# Patient Record
Sex: MALE | Race: WHITE | NOT HISPANIC OR LATINO | Employment: PART TIME | URBAN - METROPOLITAN AREA
[De-identification: names, ages, dates, MRNs, and addresses within clinical notes are randomized per-mention and may not be internally consistent; named-entity substitution may affect disease eponyms.]

---

## 2017-01-16 ENCOUNTER — OFFICE VISIT (OUTPATIENT)
Dept: FAMILY MEDICINE CLINIC | Facility: CLINIC | Age: 62
End: 2017-01-16

## 2017-01-16 VITALS
DIASTOLIC BLOOD PRESSURE: 89 MMHG | SYSTOLIC BLOOD PRESSURE: 136 MMHG | HEIGHT: 73 IN | WEIGHT: 190 LBS | RESPIRATION RATE: 16 BRPM | TEMPERATURE: 98 F | BODY MASS INDEX: 25.18 KG/M2 | HEART RATE: 95 BPM

## 2017-01-16 DIAGNOSIS — IMO0001 UNCONTROLLED TYPE 2 DIABETES MELLITUS WITHOUT COMPLICATION, WITH LONG-TERM CURRENT USE OF INSULIN: Primary | ICD-10-CM

## 2017-01-16 DIAGNOSIS — N52.9 ERECTILE DYSFUNCTION, UNSPECIFIED ERECTILE DYSFUNCTION TYPE: ICD-10-CM

## 2017-01-16 DIAGNOSIS — E78.00 PURE HYPERCHOLESTEROLEMIA: ICD-10-CM

## 2017-01-16 DIAGNOSIS — F41.9 ANXIETY: ICD-10-CM

## 2017-01-16 PROCEDURE — 99214 OFFICE O/P EST MOD 30 MIN: CPT | Performed by: FAMILY MEDICINE

## 2017-01-16 RX ORDER — TADALAFIL 20 MG/1
20 TABLET ORAL DAILY PRN
Qty: 6 TABLET | Refills: 5 | Status: SHIPPED | OUTPATIENT
Start: 2017-01-16 | End: 2018-02-15 | Stop reason: SDUPTHER

## 2017-01-16 RX ORDER — METFORMIN HYDROCHLORIDE 500 MG/1
2000 TABLET, EXTENDED RELEASE ORAL
Qty: 360 TABLET | Refills: 1 | Status: SHIPPED | OUTPATIENT
Start: 2017-01-16 | End: 2017-07-24 | Stop reason: SDUPTHER

## 2017-01-16 RX ORDER — ATORVASTATIN CALCIUM 20 MG/1
20 TABLET, FILM COATED ORAL NIGHTLY
Qty: 90 TABLET | Refills: 1 | Status: SHIPPED | OUTPATIENT
Start: 2017-01-16 | End: 2017-07-24 | Stop reason: SDUPTHER

## 2017-01-16 RX ORDER — BUPROPION HYDROCHLORIDE 300 MG/1
300 TABLET ORAL NIGHTLY
Qty: 90 TABLET | Refills: 1 | Status: SHIPPED | OUTPATIENT
Start: 2017-01-16 | End: 2017-07-24 | Stop reason: SDUPTHER

## 2017-01-16 RX ORDER — PIOGLITAZONEHYDROCHLORIDE 15 MG/1
15 TABLET ORAL DAILY
Qty: 90 TABLET | Refills: 1 | Status: SHIPPED | OUTPATIENT
Start: 2017-01-16 | End: 2017-07-15

## 2017-01-16 NOTE — MR AVS SNAPSHOT
Dylan Mosher   1/16/2017 9:15 AM   Office Visit    Provider:  Dylan Paulson MD   Department:  [facility]   Dept Phone:  [phone]                Your Full Care Plan              Today's Medication Changes          These changes are accurate as of: 1/16/17  9:46 AM.  If you have any questions, ask your nurse or doctor.               New Medication(s)Ordered:     metFORMIN  MG 24 hr tablet   Commonly known as:  GLUCOPHAGE-XR   Take 4 tablets by mouth Daily With Dinner for 180 days.   Started by:  Dylan Paulson MD       tadalafil 20 MG tablet   Commonly known as:  CIALIS   Take 1 tablet by mouth Daily As Needed for erectile dysfunction for up to 180 days.   Started by:  Dylan Paulson MD            Where to Get Your Medications      These medications were sent to [address] AT [facility] [phone] [facility] [phone] [address], [facility] [address]     Phone:  [phone]     atorvastatin 20 MG tablet    buPROPion  MG 24 hr tablet    Canagliflozin 300 MG tablet    metFORMIN  MG 24 hr tablet    pioglitazone 15 MG tablet    tadalafil 20 MG tablet                  Your Updated Medication List          This list is accurate as of: 1/16/17  9:46 AM.  Always use your most recent med list.                aspirin 81 MG EC tablet       atorvastatin 20 MG tablet   Commonly known as:  LIPITOR   Take 1 tablet by mouth Every Night for 180 days.       buPROPion  MG 24 hr tablet   Commonly known as:  WELLBUTRIN XL   Take 1 tablet by mouth Every Night for 180 days.       Canagliflozin 300 MG tablet   Commonly known as:  INVOKANA   Take 300 mg by mouth Every Morning for 180 days.       metFORMIN  MG 24 hr tablet   Commonly known as:  GLUCOPHAGE-XR   Take 4 tablets by mouth Daily With Dinner for 180 days.       pioglitazone 15 MG tablet   Commonly known as:   "ACTOS   Take 1 tablet by mouth Daily for 180 days.       tadalafil 20 MG tablet   Commonly known as:  CIALIS   Take 1 tablet by mouth Daily As Needed for erectile dysfunction for up to 180 days.               We Performed the Following     Ambulatory Referral to Ophthalmology       You Were Diagnosed With        Codes Comments    Uncontrolled type 2 diabetes mellitus without complication, with long-term current use of insulin    -  Primary ICD-10-CM: E11.65, Z79.4  ICD-9-CM: 250.02, V58.67     Pure hypercholesterolemia     ICD-10-CM: E78.00  ICD-9-CM: 272.0     Anxiety     ICD-10-CM: F41.9  ICD-9-CM: 300.00     Erectile dysfunction, unspecified erectile dysfunction type     ICD-10-CM: N52.9  ICD-9-CM: 607.84       Instructions    Get labs in 3 months and again prior to visit in 6 months     Patient Instructions History      Done In :60 Seconds Signup     [facility] Done In :60 Seconds allows you to send messages to your doctor, view your test results, renew your prescriptions, schedule appointments, and more. To sign up, go to [employer] and click on the Sign Up Now link in the New User? box. Enter your Done In :60 Seconds Activation Code exactly as it appears below along with the last four digits of your Social Security Number and your Date of Birth () to complete the sign-up process. If you do not sign up before the expiration date, you must request a new code.    Done In :60 Seconds Activation Code: [email or web address]  Expires: 2017  9:46 AM    If you have questions, you can email [email or web address] or call [phone] to talk to our Done In :60 Seconds staff. Remember, Done In :60 Seconds is NOT to be used for urgent needs. For medical emergencies, dial 911.               Other Info from Your Visit           Allergies     No Known Allergies      Reason for Visit     Diabetes     Results labs      Vital Signs     Blood Pressure Pulse Temperature Respirations Height Weight    136/89 95 98 °F (36.7 °C) (Oral) 16 73\" (185.4 cm) 190 lb (86.2 kg)    " Body Mass Index Smoking Status                25.07 kg/m2 Former Smoker          Problems and Diagnoses Noted     Anxiety problem    ED (erectile dysfunction)    High cholesterol    Uncontrolled type 2 diabetes mellitus without complication, with long-term current use of insulin

## 2017-01-16 NOTE — PROGRESS NOTES
"Chief Complaint   Patient presents with   • Diabetes   • Results     labs       Subjective   This patient presents the office review labs and refill medicines.  Blood pressure is controlled.  Lipids are to goal.  He still has one liver function tests that is elevated but it has shown interval improvement.  His diabetes shown worse control.  We will restart his metformin.  No other medication  changes are needed.  He would like therapy for ED symptoms.  Libido is intact.  We will try Cialis.  Review of Systems   Constitutional: Negative for fatigue.   Cardiovascular: Negative for chest pain.   Genitourinary:        See HPI       Objective   Visit Vitals   • /89   • Pulse 95   • Temp 98 °F (36.7 °C) (Oral)   • Resp 16   • Ht 73\" (185.4 cm)   • Wt 190 lb (86.2 kg)   • BMI 25.07 kg/m2     Body mass index is 25.07 kg/(m^2).  Physical Exam   Constitutional: He is cooperative. No distress.   Eyes: Conjunctivae and lids are normal.   Neck: Carotid bruit is not present. No tracheal deviation present.   Cardiovascular: Normal rate, regular rhythm and normal heart sounds.    No murmur heard.  Pulmonary/Chest: Effort normal and breath sounds normal.    Dylan had a diabetic foot exam performed today.   During the foot exam he had a monofilament test performed (normal sensation in 3 points on each foot).   Skin Integrity  -  His right foot skin is intact.   Dylan's left foot skin is intact. .  Neurological: He is alert. He is not disoriented.   Skin: Skin is warm and dry.   Psychiatric: He has a normal mood and affect. His speech is normal and behavior is normal.   Vitals reviewed.      Assessment/Plan     Problem List Items Addressed This Visit        Cardiovascular and Mediastinum    Pure hypercholesterolemia    Relevant Medications    atorvastatin (LIPITOR) 20 MG tablet    Other Relevant Orders    Lipid Panel With LDL/HDL Ratio    Lipid Panel With LDL/HDL Ratio       Endocrine    Uncontrolled type 2 diabetes mellitus " without complication, with long-term current use of insulin - Primary    Relevant Medications    Canagliflozin (INVOKANA) 300 MG tablet    pioglitazone (ACTOS) 15 MG tablet    metFORMIN XR (GLUCOPHAGE-XR) 500 MG 24 hr tablet    Other Relevant Orders    Comprehensive metabolic panel    CBC and Differential    Hemoglobin A1c    MicroAlbumin, Urine, Random    Comprehensive metabolic panel    CBC and Differential    Hemoglobin A1c    MicroAlbumin, Urine, Random    Ambulatory Referral to Ophthalmology       Genitourinary    ED (erectile dysfunction)    Relevant Medications    tadalafil (CIALIS) 20 MG tablet       Other    Anxiety    Relevant Medications    buPROPion XL (WELLBUTRIN XL) 300 MG 24 hr tablet    Other Relevant Orders    [facility]          Outpatient Encounter Prescriptions as of 1/16/2017   Medication Sig Dispense Refill   • aspirin 81 MG EC tablet Take 81 mg by mouth daily. Take 1 tablet by oral route once daily     • atorvastatin (LIPITOR) 20 MG tablet Take 1 tablet by mouth Every Night for 180 days. 90 tablet 1   • buPROPion XL (WELLBUTRIN XL) 300 MG 24 hr tablet Take 1 tablet by mouth Every Night for 180 days. 90 tablet 1   • Canagliflozin (INVOKANA) 300 MG tablet Take 300 mg by mouth Every Morning for 180 days. 30 tablet 5   • pioglitazone (ACTOS) 15 MG tablet Take 1 tablet by mouth Daily for 180 days. 90 tablet 1   • [DISCONTINUED] atorvastatin (LIPITOR) 20 MG tablet Take 1 tablet by mouth Every Night for 180 days. 90 tablet 1   • [DISCONTINUED] buPROPion XL (WELLBUTRIN XL) 300 MG 24 hr tablet Take 1 tablet by mouth Every Night for 180 days. 90 tablet 1   • [DISCONTINUED] Canagliflozin (INVOKANA) 300 MG tablet Take 300 mg by mouth Every Morning for 180 days. 30 tablet 5   • [DISCONTINUED] metFORMIN (FORTAMET) 1000 MG (OSM) 24 hr tablet Take 2 tablets by mouth Daily With Breakfast for 180 days. 180 tablet 1   • [DISCONTINUED] pioglitazone (ACTOS) 15 MG tablet Take 1 tablet by mouth Daily for 180 days.  90 tablet 1   • metFORMIN XR (GLUCOPHAGE-XR) 500 MG 24 hr tablet Take 4 tablets by mouth Daily With Dinner for 180 days. 360 tablet 1   • tadalafil (CIALIS) 20 MG tablet Take 1 tablet by mouth Daily As Needed for erectile dysfunction for up to 180 days. 6 tablet 5     No facility-administered encounter medications on file as of 1/16/2017.        Orders Placed This Encounter   Procedures   • Comprehensive metabolic panel     Standing Status:   Future     Standing Expiration Date:   7/15/2017   • Lipid Panel With LDL/HDL Ratio     Standing Status:   Future     Standing Expiration Date:   7/15/2017   • TSH     Standing Status:   Future     Standing Expiration Date:   7/15/2017   • Hemoglobin A1c     Standing Status:   Future     Standing Expiration Date:   7/15/2017   • MicroAlbumin, Urine, Random     Standing Status:   Future     Standing Expiration Date:   7/15/2017   • Comprehensive metabolic panel     Standing Status:   Future     Standing Expiration Date:   10/13/2017   • Lipid Panel With LDL/HDL Ratio     Standing Status:   Future     Standing Expiration Date:   10/13/2017   • TSH     Standing Status:   Future     Standing Expiration Date:   10/13/2017   • Hemoglobin A1c     Standing Status:   Future     Standing Expiration Date:   10/13/2017   • MicroAlbumin, Urine, Random     Standing Status:   Future     Standing Expiration Date:   10/13/2017   • Ambulatory Referral to Ophthalmology     Referral Priority:   Routine     Referral Type:   Consultation     Referral Reason:   Specialty Services Required     Referred to Provider:   Atilio Cantrell MD     Requested Specialty:   Ophthalmology     Number of Visits Requested:   1       Continue with current treatment plan.  Get labs in 3 months and again prior to visit in 6 months       F/U in 6 months

## 2017-03-17 ENCOUNTER — RESULTS ENCOUNTER (OUTPATIENT)
Dept: FAMILY MEDICINE CLINIC | Facility: CLINIC | Age: 62
End: 2017-03-17

## 2017-03-17 DIAGNOSIS — F41.9 ANXIETY: ICD-10-CM

## 2017-03-17 DIAGNOSIS — IMO0001 UNCONTROLLED TYPE 2 DIABETES MELLITUS WITHOUT COMPLICATION, WITH LONG-TERM CURRENT USE OF INSULIN: ICD-10-CM

## 2017-03-17 DIAGNOSIS — E78.00 PURE HYPERCHOLESTEROLEMIA: ICD-10-CM

## 2017-06-15 ENCOUNTER — RESULTS ENCOUNTER (OUTPATIENT)
Dept: FAMILY MEDICINE CLINIC | Facility: CLINIC | Age: 62
End: 2017-06-15

## 2017-06-15 DIAGNOSIS — IMO0001 UNCONTROLLED TYPE 2 DIABETES MELLITUS WITHOUT COMPLICATION, WITH LONG-TERM CURRENT USE OF INSULIN: ICD-10-CM

## 2017-06-15 DIAGNOSIS — E78.00 PURE HYPERCHOLESTEROLEMIA: ICD-10-CM

## 2017-06-15 DIAGNOSIS — F41.9 ANXIETY: ICD-10-CM

## 2017-07-24 ENCOUNTER — OFFICE VISIT (OUTPATIENT)
Dept: FAMILY MEDICINE CLINIC | Facility: CLINIC | Age: 62
End: 2017-07-24

## 2017-07-24 VITALS
TEMPERATURE: 98.2 F | DIASTOLIC BLOOD PRESSURE: 84 MMHG | RESPIRATION RATE: 16 BRPM | HEART RATE: 82 BPM | WEIGHT: 195 LBS | BODY MASS INDEX: 25.84 KG/M2 | HEIGHT: 73 IN | SYSTOLIC BLOOD PRESSURE: 122 MMHG

## 2017-07-24 DIAGNOSIS — IMO0001 UNCONTROLLED TYPE 2 DIABETES MELLITUS WITHOUT COMPLICATION, WITH LONG-TERM CURRENT USE OF INSULIN: ICD-10-CM

## 2017-07-24 DIAGNOSIS — E78.00 PURE HYPERCHOLESTEROLEMIA: ICD-10-CM

## 2017-07-24 DIAGNOSIS — F41.9 ANXIETY: ICD-10-CM

## 2017-07-24 PROCEDURE — 99214 OFFICE O/P EST MOD 30 MIN: CPT | Performed by: FAMILY MEDICINE

## 2017-07-24 RX ORDER — METFORMIN HYDROCHLORIDE 500 MG/1
2000 TABLET, EXTENDED RELEASE ORAL
Qty: 120 TABLET | Refills: 5 | Status: SHIPPED | OUTPATIENT
Start: 2017-07-24 | End: 2018-02-15 | Stop reason: SDUPTHER

## 2017-07-24 RX ORDER — BUPROPION HYDROCHLORIDE 300 MG/1
300 TABLET ORAL NIGHTLY
Qty: 30 TABLET | Refills: 5 | Status: SHIPPED | OUTPATIENT
Start: 2017-07-24 | End: 2018-02-15 | Stop reason: SDUPTHER

## 2017-07-24 RX ORDER — ATORVASTATIN CALCIUM 20 MG/1
20 TABLET, FILM COATED ORAL NIGHTLY
Qty: 30 TABLET | Refills: 5 | Status: SHIPPED | OUTPATIENT
Start: 2017-07-24 | End: 2018-02-15 | Stop reason: SDUPTHER

## 2017-07-24 RX ORDER — EPINEPHRINE 0.3 MG/.3ML
INJECTION SUBCUTANEOUS
COMMUNITY
Start: 2017-04-26 | End: 2019-08-27

## 2017-07-24 NOTE — PROGRESS NOTES
"Chief Complaint   Patient presents with   • Hyperlipidemia   • Diabetes       Subjective   This patient presents the office to refill medicines.  Labs are due.  Blood pressure is controlled without therapy.  Cholesterol is stable pending labs.  Diabetes is stable pending labs.  We talked about the potential issues with Invokana and we'll make a therapy changed to Jardiance due to those concerns. Anxiety is stable with bupropion.   I have reviewed and updated his medications, medical history and problem list during today's office visit.        Social History   Substance Use Topics   • Smoking status: Former Smoker     Packs/day: 1.50   • Smokeless tobacco: Never Used   • Alcohol use Yes      Comment: current some day; 2/wk       Review of Systems   Constitutional: Negative for fatigue.   Cardiovascular: Negative for chest pain.       Objective   /84  Pulse 82  Temp 98.2 °F (36.8 °C) (Oral)   Resp 16  Ht 73\" (185.4 cm)  Wt 195 lb (88.5 kg)  BMI 25.73 kg/m2  Body mass index is 25.73 kg/(m^2).  Physical Exam   Constitutional: He is cooperative. No distress.   Eyes: Conjunctivae and lids are normal.   Neck: Carotid bruit is not present. No tracheal deviation present.   Cardiovascular: Normal rate, regular rhythm and normal heart sounds.    No murmur heard.  Pulmonary/Chest: Effort normal and breath sounds normal.   Neurological: He is alert. He is not disoriented.   Skin: Skin is warm and dry.   Psychiatric: He has a normal mood and affect. His speech is normal and behavior is normal.   Vitals reviewed.          Assessment/Plan     Problem List Items Addressed This Visit        Cardiovascular and Mediastinum    Pure hypercholesterolemia    Relevant Medications    atorvastatin (LIPITOR) 20 MG tablet       Endocrine    Uncontrolled type 2 diabetes mellitus without complication, with long-term current use of insulin    Relevant Medications    Empagliflozin 25 MG tablet    metFORMIN ER (GLUCOPHAGE-XR) 500 MG 24 " hr tablet       Other    Anxiety    Relevant Medications    buPROPion XL (WELLBUTRIN XL) 300 MG 24 hr tablet          Outpatient Encounter Prescriptions as of 7/24/2017   Medication Sig Dispense Refill   • aspirin 81 MG EC tablet Take 81 mg by mouth daily. Take 1 tablet by oral route once daily     • EPINEPHrine (EPIPEN) 0.3 MG/0.3ML solution auto-injector injection      • atorvastatin (LIPITOR) 20 MG tablet Take 1 tablet by mouth Every Night for 180 days. 30 tablet 5   • buPROPion XL (WELLBUTRIN XL) 300 MG 24 hr tablet Take 1 tablet by mouth Every Night for 180 days. 30 tablet 5   • Empagliflozin 25 MG tablet Take 1 tablet by mouth Every Morning for 180 days. 30 tablet 5   • metFORMIN ER (GLUCOPHAGE-XR) 500 MG 24 hr tablet Take 4 tablets by mouth Daily With Dinner for 180 days. 120 tablet 5   • tadalafil (CIALIS) 20 MG tablet Take 1 tablet by mouth Daily As Needed for erectile dysfunction for up to 180 days. 6 tablet 5   • [DISCONTINUED] atorvastatin (LIPITOR) 20 MG tablet Take 1 tablet by mouth Every Night for 180 days. 90 tablet 1   • [DISCONTINUED] buPROPion XL (WELLBUTRIN XL) 300 MG 24 hr tablet Take 1 tablet by mouth Every Night for 180 days. 90 tablet 1   • [DISCONTINUED] Canagliflozin (INVOKANA) 300 MG tablet Take 300 mg by mouth Every Morning for 180 days. 30 tablet 5   • [DISCONTINUED] metFORMIN XR (GLUCOPHAGE-XR) 500 MG 24 hr tablet Take 4 tablets by mouth Daily With Dinner for 180 days. 360 tablet 1     No facility-administered encounter medications on file as of 7/24/2017.        No orders of the defined types were placed in this encounter.      Continue with current treatment plan.         F/U in 6 months

## 2017-07-30 LAB
ALBUMIN SERPL-MCNC: 4.3 G/DL (ref 3.6–4.8)
ALBUMIN/GLOB SERPL: 1.5 {RATIO} (ref 1.2–2.2)
ALP SERPL-CCNC: 86 IU/L (ref 39–117)
ALT SERPL-CCNC: 42 IU/L (ref 0–44)
AST SERPL-CCNC: 27 IU/L (ref 0–40)
BASOPHILS # BLD AUTO: 0.1 X10E3/UL (ref 0–0.2)
BASOPHILS NFR BLD AUTO: 2 %
BILIRUB SERPL-MCNC: 0.4 MG/DL (ref 0–1.2)
BUN SERPL-MCNC: 14 MG/DL (ref 8–27)
BUN/CREAT SERPL: 12 (ref 10–24)
CALCIUM SERPL-MCNC: 9.8 MG/DL (ref 8.6–10.2)
CHLORIDE SERPL-SCNC: 100 MMOL/L (ref 96–106)
CHOLEST SERPL-MCNC: 157 MG/DL (ref 100–199)
CO2 SERPL-SCNC: 22 MMOL/L (ref 18–29)
CREAT SERPL-MCNC: 1.18 MG/DL (ref 0.76–1.27)
EOSINOPHIL # BLD AUTO: 0.3 X10E3/UL (ref 0–0.4)
EOSINOPHIL NFR BLD AUTO: 4 %
ERYTHROCYTE [DISTWIDTH] IN BLOOD BY AUTOMATED COUNT: 13.9 % (ref 12.3–15.4)
GLOBULIN SER CALC-MCNC: 2.9 G/DL (ref 1.5–4.5)
GLUCOSE SERPL-MCNC: 122 MG/DL (ref 65–99)
HBA1C MFR BLD: 7.4 % (ref 4.8–5.6)
HCT VFR BLD AUTO: 47.6 % (ref 37.5–51)
HDLC SERPL-MCNC: 50 MG/DL
HGB BLD-MCNC: 16 G/DL (ref 12.6–17.7)
IMM GRANULOCYTES # BLD: 0 X10E3/UL (ref 0–0.1)
IMM GRANULOCYTES NFR BLD: 0 %
LDLC SERPL CALC-MCNC: 85 MG/DL (ref 0–99)
LDLC/HDLC SERPL: 1.7 RATIO UNITS (ref 0–3.6)
LYMPHOCYTES # BLD AUTO: 1.8 X10E3/UL (ref 0.7–3.1)
LYMPHOCYTES NFR BLD AUTO: 26 %
MCH RBC QN AUTO: 31.3 PG (ref 26.6–33)
MCHC RBC AUTO-ENTMCNC: 33.6 G/DL (ref 31.5–35.7)
MCV RBC AUTO: 93 FL (ref 79–97)
MICROALBUMIN UR-MCNC: 3.2 UG/ML
MONOCYTES # BLD AUTO: 0.7 X10E3/UL (ref 0.1–0.9)
MONOCYTES NFR BLD AUTO: 10 %
NEUTROPHILS # BLD AUTO: 4 X10E3/UL (ref 1.4–7)
NEUTROPHILS NFR BLD AUTO: 58 %
PLATELET # BLD AUTO: 280 X10E3/UL (ref 150–379)
POTASSIUM SERPL-SCNC: 4.6 MMOL/L (ref 3.5–5.2)
PROT SERPL-MCNC: 7.2 G/DL (ref 6–8.5)
RBC # BLD AUTO: 5.11 X10E6/UL (ref 4.14–5.8)
SODIUM SERPL-SCNC: 141 MMOL/L (ref 134–144)
TRIGL SERPL-MCNC: 111 MG/DL (ref 0–149)
TSH SERPL DL<=0.005 MIU/L-ACNC: 2.04 UIU/ML (ref 0.45–4.5)
VLDLC SERPL CALC-MCNC: 22 MG/DL (ref 5–40)
WBC # BLD AUTO: 6.9 X10E3/UL (ref 3.4–10.8)

## 2017-08-03 DIAGNOSIS — IMO0001 UNCONTROLLED TYPE 2 DIABETES MELLITUS WITHOUT COMPLICATION, WITH LONG-TERM CURRENT USE OF INSULIN: Primary | ICD-10-CM

## 2017-08-03 NOTE — PROGRESS NOTES
Note from [facility] pharmacy stated that Jardiance is not covered.  I switch therapy to Farxiga.  We will keep same follow-up.  Please call patient and notify him of this formulary change.

## 2018-02-05 ENCOUNTER — TELEPHONE (OUTPATIENT)
Dept: FAMILY MEDICINE CLINIC | Facility: CLINIC | Age: 63
End: 2018-02-05

## 2018-02-05 DIAGNOSIS — IMO0001 UNCONTROLLED TYPE 2 DIABETES MELLITUS WITHOUT COMPLICATION, WITH LONG-TERM CURRENT USE OF INSULIN: Primary | ICD-10-CM

## 2018-02-05 DIAGNOSIS — E78.00 PURE HYPERCHOLESTEROLEMIA: ICD-10-CM

## 2018-02-13 LAB
ALBUMIN SERPL-MCNC: 5 G/DL (ref 3.5–5.2)
ALBUMIN/GLOB SERPL: 1.7 G/DL
ALP SERPL-CCNC: 94 U/L (ref 39–117)
ALT SERPL-CCNC: 36 U/L (ref 1–41)
AST SERPL-CCNC: 19 U/L (ref 1–40)
BASOPHILS # BLD AUTO: 0.04 10*3/MM3 (ref 0–0.2)
BASOPHILS NFR BLD AUTO: 0.5 % (ref 0–1.5)
BILIRUB SERPL-MCNC: 0.6 MG/DL (ref 0.1–1.2)
BUN SERPL-MCNC: 20 MG/DL (ref 8–23)
BUN/CREAT SERPL: 15.4 (ref 7–25)
CALCIUM SERPL-MCNC: 9.9 MG/DL (ref 8.6–10.5)
CHLORIDE SERPL-SCNC: 94 MMOL/L (ref 98–107)
CHOLEST SERPL-MCNC: 145 MG/DL (ref 0–200)
CO2 SERPL-SCNC: 23.9 MMOL/L (ref 22–29)
CREAT SERPL-MCNC: 1.3 MG/DL (ref 0.76–1.27)
EOSINOPHIL # BLD AUTO: 0.14 10*3/MM3 (ref 0–0.7)
EOSINOPHIL NFR BLD AUTO: 1.8 % (ref 0.3–6.2)
ERYTHROCYTE [DISTWIDTH] IN BLOOD BY AUTOMATED COUNT: 13.8 % (ref 11.5–14.5)
GFR SERPLBLD CREATININE-BSD FMLA CKD-EPI: 56 ML/MIN/1.73
GFR SERPLBLD CREATININE-BSD FMLA CKD-EPI: 68 ML/MIN/1.73
GLOBULIN SER CALC-MCNC: 2.9 GM/DL
GLUCOSE SERPL-MCNC: 141 MG/DL (ref 65–99)
HBA1C MFR BLD: 6.6 % (ref 4.8–5.6)
HCT VFR BLD AUTO: 51 % (ref 40.4–52.2)
HDLC SERPL-MCNC: 51 MG/DL (ref 40–60)
HGB BLD-MCNC: 16.6 G/DL (ref 13.7–17.6)
IMM GRANULOCYTES # BLD: 0.03 10*3/MM3 (ref 0–0.03)
IMM GRANULOCYTES NFR BLD: 0.4 % (ref 0–0.5)
LDLC SERPL CALC-MCNC: 73 MG/DL (ref 0–100)
LDLC/HDLC SERPL: 1.44 {RATIO}
LYMPHOCYTES # BLD AUTO: 1.73 10*3/MM3 (ref 0.9–4.8)
LYMPHOCYTES NFR BLD AUTO: 22.6 % (ref 19.6–45.3)
MCH RBC QN AUTO: 30.2 PG (ref 27–32.7)
MCHC RBC AUTO-ENTMCNC: 32.5 G/DL (ref 32.6–36.4)
MCV RBC AUTO: 92.7 FL (ref 79.8–96.2)
MICROALBUMIN UR-MCNC: 6.4 UG/ML
MONOCYTES # BLD AUTO: 0.66 10*3/MM3 (ref 0.2–1.2)
MONOCYTES NFR BLD AUTO: 8.6 % (ref 5–12)
NEUTROPHILS # BLD AUTO: 5.06 10*3/MM3 (ref 1.9–8.1)
NEUTROPHILS NFR BLD AUTO: 66.1 % (ref 42.7–76)
PLATELET # BLD AUTO: 304 10*3/MM3 (ref 140–500)
POTASSIUM SERPL-SCNC: 4.5 MMOL/L (ref 3.5–5.2)
PROT SERPL-MCNC: 7.9 G/DL (ref 6–8.5)
RBC # BLD AUTO: 5.5 10*6/MM3 (ref 4.6–6)
SODIUM SERPL-SCNC: 136 MMOL/L (ref 136–145)
TRIGL SERPL-MCNC: 104 MG/DL (ref 0–150)
TSH SERPL DL<=0.005 MIU/L-ACNC: 2.53 MIU/ML (ref 0.27–4.2)
VLDLC SERPL CALC-MCNC: 20.8 MG/DL (ref 5–40)
WBC # BLD AUTO: 7.66 10*3/MM3 (ref 4.5–10.7)

## 2018-02-15 ENCOUNTER — OFFICE VISIT (OUTPATIENT)
Dept: FAMILY MEDICINE CLINIC | Facility: CLINIC | Age: 63
End: 2018-02-15

## 2018-02-15 VITALS
OXYGEN SATURATION: 99 % | TEMPERATURE: 98 F | DIASTOLIC BLOOD PRESSURE: 79 MMHG | BODY MASS INDEX: 23.33 KG/M2 | RESPIRATION RATE: 18 BRPM | SYSTOLIC BLOOD PRESSURE: 127 MMHG | HEIGHT: 73 IN | WEIGHT: 176 LBS | HEART RATE: 85 BPM

## 2018-02-15 DIAGNOSIS — N28.9 DECREASED RENAL FUNCTION: Primary | ICD-10-CM

## 2018-02-15 DIAGNOSIS — IMO0001 UNCONTROLLED TYPE 2 DIABETES MELLITUS WITHOUT COMPLICATION, WITH LONG-TERM CURRENT USE OF INSULIN: ICD-10-CM

## 2018-02-15 DIAGNOSIS — E78.00 PURE HYPERCHOLESTEROLEMIA: ICD-10-CM

## 2018-02-15 DIAGNOSIS — N52.9 ERECTILE DYSFUNCTION, UNSPECIFIED ERECTILE DYSFUNCTION TYPE: ICD-10-CM

## 2018-02-15 DIAGNOSIS — F41.9 ANXIETY: ICD-10-CM

## 2018-02-15 PROCEDURE — 99214 OFFICE O/P EST MOD 30 MIN: CPT | Performed by: NURSE PRACTITIONER

## 2018-02-15 RX ORDER — TADALAFIL 20 MG/1
20 TABLET ORAL DAILY PRN
Qty: 6 TABLET | Refills: 5 | Status: SHIPPED | OUTPATIENT
Start: 2018-02-15 | End: 2018-02-16 | Stop reason: SDUPTHER

## 2018-02-15 RX ORDER — METFORMIN HYDROCHLORIDE 500 MG/1
2000 TABLET, EXTENDED RELEASE ORAL
Qty: 120 TABLET | Refills: 5 | Status: SHIPPED | OUTPATIENT
Start: 2018-02-15 | End: 2018-02-16 | Stop reason: SDUPTHER

## 2018-02-15 RX ORDER — BUPROPION HYDROCHLORIDE 300 MG/1
300 TABLET ORAL NIGHTLY
Qty: 30 TABLET | Refills: 5 | Status: SHIPPED | OUTPATIENT
Start: 2018-02-15 | End: 2018-08-15 | Stop reason: SDUPTHER

## 2018-02-15 RX ORDER — ATORVASTATIN CALCIUM 20 MG/1
20 TABLET, FILM COATED ORAL NIGHTLY
Qty: 30 TABLET | Refills: 5 | Status: SHIPPED | OUTPATIENT
Start: 2018-02-15 | End: 2018-02-16 | Stop reason: SDUPTHER

## 2018-02-15 NOTE — PROGRESS NOTES
Subjective   Dylan Mosher is a 62 y.o. male.     History of Present Illness   Dylan Mosher 62 y.o. male who presents today for routine follow up check and medication refills.  he has a history of   Patient Active Problem List   Diagnosis   • ED (erectile dysfunction)   • Anxiety   • Colon polyp   • Pure hypercholesterolemia   • Need for hepatitis C screening test   • Sleep apnea   • Trigger middle finger of left hand   • Diverticulosis of intestine without bleeding   • Uncontrolled type 2 diabetes mellitus without complication, with long-term current use of insulin   .  Since the last visit, he has overall felt well.  He has DMII and is well controlled on medication and Hyperlipidemia and is well controlled on medication.  he has been compliant with current medications have reviewed them.  The patient denies medication side effects.    Results for orders placed or performed in visit on 02/05/18   Comprehensive metabolic panel   Result Value Ref Range    Glucose 141 (H) 65 - 99 mg/dL    BUN 20 8 - 23 mg/dL    Creatinine 1.30 (H) 0.76 - 1.27 mg/dL    eGFR Non African Am 56 (L) >60 mL/min/1.73    eGFR African Am 68 >60 mL/min/1.73    BUN/Creatinine Ratio 15.4 7.0 - 25.0    Sodium 136 136 - 145 mmol/L    Potassium 4.5 3.5 - 5.2 mmol/L    Chloride 94 (L) 98 - 107 mmol/L    Total CO2 23.9 22.0 - 29.0 mmol/L    Calcium 9.9 8.6 - 10.5 mg/dL    Total Protein 7.9 6.0 - 8.5 g/dL    Albumin 5.00 3.50 - 5.20 g/dL    Globulin 2.9 gm/dL    A/G Ratio 1.7 g/dL    Total Bilirubin 0.6 0.1 - 1.2 mg/dL    Alkaline Phosphatase 94 39 - 117 U/L    AST (SGOT) 19 1 - 40 U/L    ALT (SGPT) 36 1 - 41 U/L   Lipid Panel With LDL/HDL Ratio   Result Value Ref Range    Total Cholesterol 145 0 - 200 mg/dL    Triglycerides 104 0 - 150 mg/dL    HDL Cholesterol 51 40 - 60 mg/dL    VLDL Cholesterol 20.8 5 - 40 mg/dL    LDL Cholesterol  73 0 - 100 mg/dL    LDL/HDL Ratio 1.44    TSH   Result Value Ref Range    TSH 2.530 0.270 - 4.200 mIU/mL    Hemoglobin A1c   Result Value Ref Range    Hemoglobin A1C 6.60 (H) 4.80 - 5.60 %   MicroAlbumin, Urine, Random - Urine, Clean Catch   Result Value Ref Range    Microalbumin, Urine 6.4 Not Estab. ug/mL   CBC and Differential   Result Value Ref Range    WBC 7.66 4.50 - 10.70 10*3/mm3    RBC 5.50 4.60 - 6.00 10*6/mm3    Hemoglobin 16.6 13.7 - 17.6 g/dL    Hematocrit 51.0 40.4 - 52.2 %    MCV 92.7 79.8 - 96.2 fL    MCH 30.2 27.0 - 32.7 pg    MCHC 32.5 (L) 32.6 - 36.4 g/dL    RDW 13.8 11.5 - 14.5 %    Platelets 304 140 - 500 10*3/mm3    Neutrophil Rel % 66.1 42.7 - 76.0 %    Lymphocyte Rel % 22.6 19.6 - 45.3 %    Monocyte Rel % 8.6 5.0 - 12.0 %    Eosinophil Rel % 1.8 0.3 - 6.2 %    Basophil Rel % 0.5 0.0 - 1.5 %    Neutrophils Absolute 5.06 1.90 - 8.10 10*3/mm3    Lymphocytes Absolute 1.73 0.90 - 4.80 10*3/mm3    Monocytes Absolute 0.66 0.20 - 1.20 10*3/mm3    Eosinophils Absolute 0.14 0.00 - 0.70 10*3/mm3    Basophils Absolute 0.04 0.00 - 0.20 10*3/mm3    Immature Granulocyte Rel % 0.4 0.0 - 0.5 %    Immature Grans Absolute 0.03 0.00 - 0.03 10*3/mm3     Discussed decreased renal function. Reports only occasional NSAID use.    The following portions of the patient's history were reviewed and updated as appropriate: allergies, current medications, past family history, past medical history, past social history, past surgical history and problem list.    Review of Systems   Constitutional: Negative for fatigue.   Respiratory: Negative for cough and shortness of breath.    Cardiovascular: Negative for chest pain and palpitations.   Endocrine: Negative for cold intolerance, heat intolerance, polydipsia, polyphagia and polyuria.   Genitourinary: Negative for decreased urine volume and difficulty urinating.   Skin: Negative for rash.   Psychiatric/Behavioral: Negative for dysphoric mood and sleep disturbance. The patient is not nervous/anxious.        Objective   Physical Exam   Constitutional: He is oriented to person,  place, and time. He appears well-developed and well-nourished.   Neck: Carotid bruit is not present.   Cardiovascular: Normal rate and regular rhythm.    Pulmonary/Chest: Effort normal and breath sounds normal.   Neurological: He is oriented to person, place, and time.   Skin: Skin is warm and dry.   Psychiatric: He has a normal mood and affect. His behavior is normal. Judgment and thought content normal.   Nursing note and vitals reviewed.      Assessment/Plan   Dylna was seen today for hyperlipidemia, diabetes and depression.    Diagnoses and all orders for this visit:    Decreased renal function  -     Basic metabolic panel    Pure hypercholesterolemia  -     atorvastatin (LIPITOR) 20 MG tablet; Take 1 tablet by mouth Every Night for 180 days.    Anxiety  -     buPROPion XL (WELLBUTRIN XL) 300 MG 24 hr tablet; Take 1 tablet by mouth Every Night for 180 days.    Uncontrolled type 2 diabetes mellitus without complication, with long-term current use of insulin  -     Dapagliflozin Propanediol (FARXIGA) 10 MG tablet; Take 1 tablet by mouth Every Morning for 180 days.  -     metFORMIN ER (GLUCOPHAGE-XR) 500 MG 24 hr tablet; Take 4 tablets by mouth Daily With Dinner for 180 days.    Erectile dysfunction, unspecified erectile dysfunction type  -     tadalafil (CIALIS) 20 MG tablet; Take 1 tablet by mouth Daily As Needed for erectile dysfunction for up to 180 days.          Will hydrate well and repeat nonfasting BMP in 2 weeks.

## 2018-02-16 DIAGNOSIS — E78.00 PURE HYPERCHOLESTEROLEMIA: ICD-10-CM

## 2018-02-16 DIAGNOSIS — N52.9 ERECTILE DYSFUNCTION, UNSPECIFIED ERECTILE DYSFUNCTION TYPE: ICD-10-CM

## 2018-02-16 DIAGNOSIS — IMO0001 UNCONTROLLED TYPE 2 DIABETES MELLITUS WITHOUT COMPLICATION, WITH LONG-TERM CURRENT USE OF INSULIN: ICD-10-CM

## 2018-02-16 RX ORDER — ATORVASTATIN CALCIUM 20 MG/1
20 TABLET, FILM COATED ORAL NIGHTLY
Qty: 30 TABLET | Refills: 5 | Status: SHIPPED | OUTPATIENT
Start: 2018-02-16 | End: 2018-08-15 | Stop reason: SDUPTHER

## 2018-02-16 RX ORDER — TADALAFIL 20 MG/1
20 TABLET ORAL DAILY PRN
Qty: 6 TABLET | Refills: 5 | Status: SHIPPED | OUTPATIENT
Start: 2018-02-16 | End: 2018-08-23 | Stop reason: SDUPTHER

## 2018-02-16 RX ORDER — METFORMIN HYDROCHLORIDE 500 MG/1
2000 TABLET, EXTENDED RELEASE ORAL
Qty: 120 TABLET | Refills: 5 | Status: SHIPPED | OUTPATIENT
Start: 2018-02-16 | End: 2018-08-23 | Stop reason: SDUPTHER

## 2018-03-09 LAB
BUN SERPL-MCNC: 14 MG/DL (ref 8–23)
BUN/CREAT SERPL: 11.9 (ref 7–25)
CALCIUM SERPL-MCNC: 10.5 MG/DL (ref 8.6–10.5)
CHLORIDE SERPL-SCNC: 102 MMOL/L (ref 98–107)
CO2 SERPL-SCNC: 26.7 MMOL/L (ref 22–29)
CREAT SERPL-MCNC: 1.18 MG/DL (ref 0.76–1.27)
GFR SERPLBLD CREATININE-BSD FMLA CKD-EPI: 62 ML/MIN/1.73
GFR SERPLBLD CREATININE-BSD FMLA CKD-EPI: 76 ML/MIN/1.73
GLUCOSE SERPL-MCNC: 95 MG/DL (ref 65–99)
POTASSIUM SERPL-SCNC: 4.8 MMOL/L (ref 3.5–5.2)
SODIUM SERPL-SCNC: 142 MMOL/L (ref 136–145)

## 2018-08-08 ENCOUNTER — TELEPHONE (OUTPATIENT)
Dept: FAMILY MEDICINE CLINIC | Facility: CLINIC | Age: 63
End: 2018-08-08

## 2018-08-08 ENCOUNTER — RESULTS ENCOUNTER (OUTPATIENT)
Dept: FAMILY MEDICINE CLINIC | Facility: CLINIC | Age: 63
End: 2018-08-08

## 2018-08-08 DIAGNOSIS — IMO0001 UNCONTROLLED TYPE 2 DIABETES MELLITUS WITHOUT COMPLICATION, WITH LONG-TERM CURRENT USE OF INSULIN: ICD-10-CM

## 2018-08-08 DIAGNOSIS — E78.00 PURE HYPERCHOLESTEROLEMIA: ICD-10-CM

## 2018-08-08 DIAGNOSIS — Z12.5 SCREENING FOR PROSTATE CANCER: ICD-10-CM

## 2018-08-08 DIAGNOSIS — E78.00 PURE HYPERCHOLESTEROLEMIA: Primary | ICD-10-CM

## 2018-08-12 LAB
ALBUMIN SERPL-MCNC: 4.6 G/DL (ref 3.6–4.8)
ALBUMIN/GLOB SERPL: 1.7 {RATIO} (ref 1.2–2.2)
ALP SERPL-CCNC: 111 IU/L (ref 39–117)
ALT SERPL-CCNC: 38 IU/L (ref 0–44)
AST SERPL-CCNC: 31 IU/L (ref 0–40)
BASOPHILS # BLD AUTO: 0.1 X10E3/UL (ref 0–0.2)
BASOPHILS NFR BLD AUTO: 1 %
BILIRUB SERPL-MCNC: 0.5 MG/DL (ref 0–1.2)
BUN SERPL-MCNC: 14 MG/DL (ref 8–27)
BUN/CREAT SERPL: 13 (ref 10–24)
CALCIUM SERPL-MCNC: 9.8 MG/DL (ref 8.6–10.2)
CHLORIDE SERPL-SCNC: 103 MMOL/L (ref 96–106)
CHOLEST SERPL-MCNC: 149 MG/DL (ref 100–199)
CO2 SERPL-SCNC: 23 MMOL/L (ref 20–29)
CREAT SERPL-MCNC: 1.12 MG/DL (ref 0.76–1.27)
EOSINOPHIL # BLD AUTO: 0.3 X10E3/UL (ref 0–0.4)
EOSINOPHIL NFR BLD AUTO: 4 %
ERYTHROCYTE [DISTWIDTH] IN BLOOD BY AUTOMATED COUNT: 14.1 % (ref 12.3–15.4)
GLOBULIN SER CALC-MCNC: 2.7 G/DL (ref 1.5–4.5)
GLUCOSE SERPL-MCNC: 113 MG/DL (ref 65–99)
HBA1C MFR BLD: 6.2 % (ref 4.8–5.6)
HCT VFR BLD AUTO: 46.7 % (ref 37.5–51)
HDLC SERPL-MCNC: 55 MG/DL
HGB BLD-MCNC: 15.4 G/DL (ref 13–17.7)
IMM GRANULOCYTES # BLD: 0 X10E3/UL (ref 0–0.1)
IMM GRANULOCYTES NFR BLD: 0 %
LDLC SERPL CALC-MCNC: 78 MG/DL (ref 0–99)
LDLC/HDLC SERPL: 1.4 RATIO (ref 0–3.6)
LYMPHOCYTES # BLD AUTO: 1.7 X10E3/UL (ref 0.7–3.1)
LYMPHOCYTES NFR BLD AUTO: 23 %
MCH RBC QN AUTO: 30.1 PG (ref 26.6–33)
MCHC RBC AUTO-ENTMCNC: 33 G/DL (ref 31.5–35.7)
MCV RBC AUTO: 91 FL (ref 79–97)
MICROALBUMIN UR-MCNC: 4.8 UG/ML
MONOCYTES # BLD AUTO: 0.7 X10E3/UL (ref 0.1–0.9)
MONOCYTES NFR BLD AUTO: 10 %
NEUTROPHILS # BLD AUTO: 4.6 X10E3/UL (ref 1.4–7)
NEUTROPHILS NFR BLD AUTO: 62 %
PLATELET # BLD AUTO: 267 X10E3/UL (ref 150–379)
POTASSIUM SERPL-SCNC: 4.7 MMOL/L (ref 3.5–5.2)
PROT SERPL-MCNC: 7.3 G/DL (ref 6–8.5)
PSA SERPL-MCNC: 1 NG/ML (ref 0–4)
RBC # BLD AUTO: 5.11 X10E6/UL (ref 4.14–5.8)
SODIUM SERPL-SCNC: 143 MMOL/L (ref 134–144)
TRIGL SERPL-MCNC: 79 MG/DL (ref 0–149)
TSH SERPL DL<=0.005 MIU/L-ACNC: 1.95 UIU/ML (ref 0.45–4.5)
VLDLC SERPL CALC-MCNC: 16 MG/DL (ref 5–40)
WBC # BLD AUTO: 7.4 X10E3/UL (ref 3.4–10.8)

## 2018-08-13 ENCOUNTER — RESULTS ENCOUNTER (OUTPATIENT)
Dept: FAMILY MEDICINE CLINIC | Facility: CLINIC | Age: 63
End: 2018-08-13

## 2018-08-13 DIAGNOSIS — E78.00 PURE HYPERCHOLESTEROLEMIA: ICD-10-CM

## 2018-08-13 DIAGNOSIS — Z12.5 SCREENING FOR PROSTATE CANCER: ICD-10-CM

## 2018-08-13 DIAGNOSIS — IMO0001 UNCONTROLLED TYPE 2 DIABETES MELLITUS WITHOUT COMPLICATION, WITH LONG-TERM CURRENT USE OF INSULIN: ICD-10-CM

## 2018-08-15 DIAGNOSIS — F41.9 ANXIETY: ICD-10-CM

## 2018-08-15 DIAGNOSIS — E78.00 PURE HYPERCHOLESTEROLEMIA: ICD-10-CM

## 2018-08-15 RX ORDER — ATORVASTATIN CALCIUM 20 MG/1
TABLET, FILM COATED ORAL
Qty: 30 TABLET | Refills: 0 | Status: SHIPPED | OUTPATIENT
Start: 2018-08-15 | End: 2018-08-23 | Stop reason: SDUPTHER

## 2018-08-15 RX ORDER — BUPROPION HYDROCHLORIDE 300 MG/1
TABLET ORAL
Qty: 30 TABLET | Refills: 0 | Status: SHIPPED | OUTPATIENT
Start: 2018-08-15 | End: 2018-08-23 | Stop reason: SDUPTHER

## 2018-08-23 ENCOUNTER — OFFICE VISIT (OUTPATIENT)
Dept: FAMILY MEDICINE CLINIC | Facility: CLINIC | Age: 63
End: 2018-08-23

## 2018-08-23 VITALS
BODY MASS INDEX: 23.06 KG/M2 | TEMPERATURE: 97.6 F | RESPIRATION RATE: 16 BRPM | WEIGHT: 174 LBS | DIASTOLIC BLOOD PRESSURE: 77 MMHG | HEART RATE: 83 BPM | SYSTOLIC BLOOD PRESSURE: 127 MMHG | HEIGHT: 73 IN

## 2018-08-23 DIAGNOSIS — E78.00 PURE HYPERCHOLESTEROLEMIA: ICD-10-CM

## 2018-08-23 DIAGNOSIS — N52.9 ERECTILE DYSFUNCTION, UNSPECIFIED ERECTILE DYSFUNCTION TYPE: ICD-10-CM

## 2018-08-23 DIAGNOSIS — E11.9 TYPE 2 DIABETES MELLITUS WITHOUT COMPLICATION, WITHOUT LONG-TERM CURRENT USE OF INSULIN (HCC): Primary | ICD-10-CM

## 2018-08-23 DIAGNOSIS — F41.9 ANXIETY: ICD-10-CM

## 2018-08-23 DIAGNOSIS — IMO0001 UNCONTROLLED TYPE 2 DIABETES MELLITUS WITHOUT COMPLICATION, WITH LONG-TERM CURRENT USE OF INSULIN: ICD-10-CM

## 2018-08-23 DIAGNOSIS — R35.1 NOCTURIA: ICD-10-CM

## 2018-08-23 PROCEDURE — 99214 OFFICE O/P EST MOD 30 MIN: CPT | Performed by: FAMILY MEDICINE

## 2018-08-23 RX ORDER — BUPROPION HYDROCHLORIDE 300 MG/1
300 TABLET ORAL DAILY
Qty: 30 TABLET | Refills: 5 | Status: SHIPPED | OUTPATIENT
Start: 2018-08-23 | End: 2018-09-11 | Stop reason: SDUPTHER

## 2018-08-23 RX ORDER — METFORMIN HYDROCHLORIDE 500 MG/1
2000 TABLET, EXTENDED RELEASE ORAL
Qty: 120 TABLET | Refills: 5 | Status: SHIPPED | OUTPATIENT
Start: 2018-08-23 | End: 2019-02-11 | Stop reason: SDUPTHER

## 2018-08-23 RX ORDER — ATORVASTATIN CALCIUM 20 MG/1
20 TABLET, FILM COATED ORAL NIGHTLY
Qty: 30 TABLET | Refills: 5 | Status: SHIPPED | OUTPATIENT
Start: 2018-08-23 | End: 2019-02-11 | Stop reason: SDUPTHER

## 2018-08-23 RX ORDER — TADALAFIL 20 MG/1
20 TABLET ORAL DAILY PRN
Qty: 6 TABLET | Refills: 5 | Status: SHIPPED | OUTPATIENT
Start: 2018-08-23 | End: 2019-02-11 | Stop reason: SDUPTHER

## 2018-08-23 NOTE — ASSESSMENT & PLAN NOTE
Diabetes is improving with treatment.   Continue current treatment regimen.  Diabetes will be reassessed in 6 months.

## 2018-08-23 NOTE — PROGRESS NOTES
"Chief Complaint   Patient presents with   • Diabetes   • Hypertension   • Hyperlipidemia       Subjective     Dylan Mosher presents to the office today to refill his medications and review recent labs. No medication side effects are reported.  His blood pressure is to goal on current therapy.  Lipids are stable and doing well.  LDL cholesterol slightly above 70.  Type 2 diabetes is controlled.  Hemoglobin A1c is below 7.  His ED symptoms and nocturia symptoms are stable.  PSA is normal at 1.  Remainder of labs have been reviewed and are seen below.    I have reviewed and updated his medications, medical history and problem list during today's office visit.      Patient Care Team:  Dylan Paulson MD as PCP - General (Family Medicine)    Social History   Substance Use Topics   • Smoking status: Former Smoker     Packs/day: 1.50   • Smokeless tobacco: Never Used   • Alcohol use Yes      Comment: current some day; 2/wk       Review of Systems   Constitutional: Negative for fatigue.   Cardiovascular: Negative for chest pain.       Objective     /77   Pulse 83   Temp 97.6 °F (36.4 °C) (Oral)   Resp 16   Ht 185.4 cm (73\")   Wt 78.9 kg (174 lb)   BMI 22.96 kg/m²     Body mass index is 22.96 kg/m².    Physical Exam   Constitutional: He is oriented to person, place, and time. He appears well-developed. No distress.   Eyes: Conjunctivae and lids are normal.   Neck: Carotid bruit is not present.   Cardiovascular: Normal rate, regular rhythm and normal heart sounds.    Pulmonary/Chest: Effort normal and breath sounds normal.    Dylan had a diabetic foot exam performed today.   During the foot exam he had a monofilament test performed (normal sensation in 3 points, bilaterally).    Neurological Sensory Findings -  No right ankle/foot altered proprioception and no left ankle/foot altered proprioception  Vascular Status -  His right foot exhibits normal foot vasculature  and no edema. His left foot exhibits normal " foot vasculature  and no edema.  Skin Integrity  -  His right foot skin is intact.His left foot skin is intact..  Neurological: He is alert and oriented to person, place, and time.   Skin: Skin is warm and dry.   Psychiatric: He has a normal mood and affect. His behavior is normal.   Vitals reviewed.      Data Reviewed:         CMP:  Lab Results   Component Value Date     (H) 08/11/2018    BUN 14 08/11/2018    CREATININE 1.12 08/11/2018    EGFRIFNONA 70 08/11/2018    EGFRIFAFRI 80 08/11/2018     08/11/2018    K 4.7 08/11/2018     08/11/2018    CALCIUM 9.8 08/11/2018    PROTENTOTREF 7.3 08/11/2018    ALBUMIN 4.6 08/11/2018    LABGLOBREF 2.7 08/11/2018    BILITOT 0.5 08/11/2018    ALKPHOS 111 08/11/2018    AST 31 08/11/2018    ALT 38 08/11/2018     CBC w/ diff:   Lab Results   Component Value Date    WBC 7.66 02/12/2018    RBC 5.11 08/11/2018    HGB 15.4 08/11/2018    HCT 46.7 08/11/2018    MCV 91 08/11/2018    MCH 30.1 08/11/2018    MCHC 33.0 08/11/2018    RDW 14.1 08/11/2018     08/11/2018    NEUTRORELPCT 62 08/11/2018    LYMPHORELPCT 23 08/11/2018    MONORELPCT 10 08/11/2018    EOSRELPCT 4 08/11/2018    BASORELPCT 1 08/11/2018     LIPID PANEL:  Lab Results   Component Value Date    CHLPL 149 08/11/2018    TRIG 79 08/11/2018    HDL 55 08/11/2018    VLDL 16 08/11/2018    LDL 78 08/11/2018    LDLHDL 1.4 08/11/2018     TSH:  Lab Results   Component Value Date    TSH 1.950 08/11/2018     HGBA1C (LAST 3):  Lab Results   Component Value Date    HGBA1C 6.2 (H) 08/11/2018    HGBA1C 6.60 (H) 02/12/2018    HGBA1C 7.4 (H) 07/29/2017     MICROALBUMIN SPOT URINE:  Lab Results   Component Value Date    MICROALBUR 4.8 08/11/2018     PSA:  Lab Results   Component Value Date    PSA 1.0 08/11/2018    PSA 1.0 01/07/2017       Assessment/Plan     Problem List Items Addressed This Visit     ED (erectile dysfunction)     Continue current therapy          Relevant Medications    tadalafil (CIALIS) 20 MG tablet     Anxiety     The current medical regimen is effective;  continue present plan and medications.               Relevant Medications    buPROPion XL (WELLBUTRIN XL) 300 MG 24 hr tablet    Pure hypercholesterolemia     Lipid abnormalities are unchanged.  Pharmacotherapy as ordered.  Lipids will be reassessed in 6 months.         Relevant Medications    atorvastatin (LIPITOR) 20 MG tablet    Other Relevant Orders    Comprehensive metabolic panel    Lipid Panel With LDL/HDL Ratio    CBC and Differential    Type 2 diabetes mellitus without complication, without long-term current use of insulin (CMS/[facility]) - Primary     Diabetes is improving with treatment.   Continue current treatment regimen.  Diabetes will be reassessed in 6 months.         Relevant Medications    Dapagliflozin Propanediol (FARXIGA) 10 MG tablet    metFORMIN ER (GLUCOPHAGE-XR) 500 MG 24 hr tablet    Other Relevant Orders    Hemoglobin A1c    MicroAlbumin, Urine, Random - Urine, Clean Catch    Nocturia     Stable, yearly PSA           Other Visit Diagnoses     Uncontrolled type 2 diabetes mellitus without complication, with long-term current use of insulin (CMS/[facility])        Relevant Medications    Dapagliflozin Propanediol (FARXIGA) 10 MG tablet    metFORMIN ER (GLUCOPHAGE-XR) 500 MG 24 hr tablet          Orders Placed This Encounter   Procedures   • Comprehensive metabolic panel     Standing Status:   Future     Standing Expiration Date:   5/20/2019   • Lipid Panel With LDL/HDL Ratio     Standing Status:   Future     Standing Expiration Date:   5/20/2019   • Hemoglobin A1c     Standing Status:   Future     Standing Expiration Date:   5/20/2019   • MicroAlbumin, Urine, Random - Urine, Clean Catch     Standing Status:   Future     Standing Expiration Date:   5/20/2019   • CBC and Differential     Standing Status:   Future     Standing Expiration Date:   5/20/2019     Order Specific Question:   Manual Differential     Answer:   No         Current Outpatient  Prescriptions:   •  aspirin 81 MG EC tablet, Take 81 mg by mouth daily. Take 1 tablet by oral route once daily, Disp: , Rfl:   •  atorvastatin (LIPITOR) 20 MG tablet, Take 1 tablet by mouth Every Night for 180 days., Disp: 30 tablet, Rfl: 5  •  buPROPion XL (WELLBUTRIN XL) 300 MG 24 hr tablet, Take 1 tablet by mouth Daily for 180 days., Disp: 30 tablet, Rfl: 5  •  EPINEPHrine (EPIPEN) 0.3 MG/0.3ML solution auto-injector injection, , Disp: , Rfl:   •  Dapagliflozin Propanediol (FARXIGA) 10 MG tablet, Take 10 mg by mouth Every Morning for 180 days., Disp: 30 tablet, Rfl: 5  •  metFORMIN ER (GLUCOPHAGE-XR) 500 MG 24 hr tablet, Take 4 tablets by mouth Daily With Dinner for 180 days., Disp: 120 tablet, Rfl: 5  •  tadalafil (CIALIS) 20 MG tablet, Take 1 tablet by mouth Daily As Needed for erectile dysfunction for up to 180 days., Disp: 6 tablet, Rfl: 5    No Follow-up on file.

## 2018-08-23 NOTE — ASSESSMENT & PLAN NOTE
Lipid abnormalities are unchanged.  Pharmacotherapy as ordered.  Lipids will be reassessed in 6 months.

## 2018-09-11 DIAGNOSIS — F41.9 ANXIETY: ICD-10-CM

## 2018-09-11 RX ORDER — BUPROPION HYDROCHLORIDE 300 MG/1
TABLET ORAL
Qty: 30 TABLET | Refills: 0 | Status: SHIPPED | OUTPATIENT
Start: 2018-09-11 | End: 2019-02-11 | Stop reason: SDUPTHER

## 2019-01-20 ENCOUNTER — RESULTS ENCOUNTER (OUTPATIENT)
Dept: FAMILY MEDICINE CLINIC | Facility: CLINIC | Age: 64
End: 2019-01-20

## 2019-01-20 DIAGNOSIS — E11.9 TYPE 2 DIABETES MELLITUS WITHOUT COMPLICATION, WITHOUT LONG-TERM CURRENT USE OF INSULIN (HCC): ICD-10-CM

## 2019-01-20 DIAGNOSIS — E78.00 PURE HYPERCHOLESTEROLEMIA: ICD-10-CM

## 2019-02-10 LAB
ALBUMIN SERPL-MCNC: 4.8 G/DL (ref 3.6–4.8)
ALBUMIN/GLOB SERPL: 1.8 {RATIO} (ref 1.2–2.2)
ALP SERPL-CCNC: 83 IU/L (ref 39–117)
ALT SERPL-CCNC: 39 IU/L (ref 0–44)
AST SERPL-CCNC: 30 IU/L (ref 0–40)
BASOPHILS # BLD AUTO: 0 X10E3/UL (ref 0–0.2)
BASOPHILS NFR BLD AUTO: 1 %
BILIRUB SERPL-MCNC: 0.6 MG/DL (ref 0–1.2)
BUN SERPL-MCNC: 12 MG/DL (ref 8–27)
BUN/CREAT SERPL: 11 (ref 10–24)
CALCIUM SERPL-MCNC: 9.7 MG/DL (ref 8.6–10.2)
CHLORIDE SERPL-SCNC: 103 MMOL/L (ref 96–106)
CHOLEST SERPL-MCNC: 156 MG/DL (ref 100–199)
CO2 SERPL-SCNC: 24 MMOL/L (ref 20–29)
CREAT SERPL-MCNC: 1.07 MG/DL (ref 0.76–1.27)
EOSINOPHIL # BLD AUTO: 0.2 X10E3/UL (ref 0–0.4)
EOSINOPHIL NFR BLD AUTO: 4 %
ERYTHROCYTE [DISTWIDTH] IN BLOOD BY AUTOMATED COUNT: 14.1 % (ref 12.3–15.4)
GLOBULIN SER CALC-MCNC: 2.6 G/DL (ref 1.5–4.5)
GLUCOSE SERPL-MCNC: 105 MG/DL (ref 65–99)
HBA1C MFR BLD: 6.3 % (ref 4.8–5.6)
HCT VFR BLD AUTO: 46.3 % (ref 37.5–51)
HDLC SERPL-MCNC: 50 MG/DL
HGB BLD-MCNC: 15.2 G/DL (ref 13–17.7)
IMM GRANULOCYTES # BLD AUTO: 0 X10E3/UL (ref 0–0.1)
IMM GRANULOCYTES NFR BLD AUTO: 0 %
LDLC SERPL CALC-MCNC: 82 MG/DL (ref 0–99)
LDLC/HDLC SERPL: 1.6 RATIO (ref 0–3.6)
LYMPHOCYTES # BLD AUTO: 1.5 X10E3/UL (ref 0.7–3.1)
LYMPHOCYTES NFR BLD AUTO: 23 %
MCH RBC QN AUTO: 30.3 PG (ref 26.6–33)
MCHC RBC AUTO-ENTMCNC: 32.8 G/DL (ref 31.5–35.7)
MCV RBC AUTO: 92 FL (ref 79–97)
MICROALBUMIN UR-MCNC: <3 UG/ML
MONOCYTES # BLD AUTO: 0.8 X10E3/UL (ref 0.1–0.9)
MONOCYTES NFR BLD AUTO: 11 %
NEUTROPHILS # BLD AUTO: 4.1 X10E3/UL (ref 1.4–7)
NEUTROPHILS NFR BLD AUTO: 61 %
PLATELET # BLD AUTO: 270 X10E3/UL (ref 150–379)
POTASSIUM SERPL-SCNC: 4.5 MMOL/L (ref 3.5–5.2)
PROT SERPL-MCNC: 7.4 G/DL (ref 6–8.5)
RBC # BLD AUTO: 5.01 X10E6/UL (ref 4.14–5.8)
SODIUM SERPL-SCNC: 142 MMOL/L (ref 134–144)
TRIGL SERPL-MCNC: 119 MG/DL (ref 0–149)
VLDLC SERPL CALC-MCNC: 24 MG/DL (ref 5–40)
WBC # BLD AUTO: 6.6 X10E3/UL (ref 3.4–10.8)

## 2019-02-11 ENCOUNTER — OFFICE VISIT (OUTPATIENT)
Dept: FAMILY MEDICINE CLINIC | Facility: CLINIC | Age: 64
End: 2019-02-11

## 2019-02-11 VITALS
HEART RATE: 89 BPM | HEIGHT: 72 IN | WEIGHT: 175 LBS | SYSTOLIC BLOOD PRESSURE: 120 MMHG | BODY MASS INDEX: 23.7 KG/M2 | RESPIRATION RATE: 16 BRPM | TEMPERATURE: 97.4 F | DIASTOLIC BLOOD PRESSURE: 78 MMHG

## 2019-02-11 DIAGNOSIS — R35.1 NOCTURIA: ICD-10-CM

## 2019-02-11 DIAGNOSIS — E11.9 TYPE 2 DIABETES MELLITUS WITHOUT COMPLICATION, WITHOUT LONG-TERM CURRENT USE OF INSULIN (HCC): ICD-10-CM

## 2019-02-11 DIAGNOSIS — E78.00 PURE HYPERCHOLESTEROLEMIA: Primary | ICD-10-CM

## 2019-02-11 DIAGNOSIS — N52.9 ERECTILE DYSFUNCTION, UNSPECIFIED ERECTILE DYSFUNCTION TYPE: ICD-10-CM

## 2019-02-11 DIAGNOSIS — F41.9 ANXIETY: ICD-10-CM

## 2019-02-11 PROCEDURE — 99214 OFFICE O/P EST MOD 30 MIN: CPT | Performed by: FAMILY MEDICINE

## 2019-02-11 RX ORDER — BUPROPION HYDROCHLORIDE 300 MG/1
300 TABLET ORAL NIGHTLY
Qty: 30 TABLET | Refills: 5 | Status: SHIPPED | OUTPATIENT
Start: 2019-02-11 | End: 2019-06-27 | Stop reason: SDUPTHER

## 2019-02-11 RX ORDER — TADALAFIL 20 MG/1
20 TABLET ORAL DAILY PRN
Qty: 6 TABLET | Refills: 5 | Status: SHIPPED | OUTPATIENT
Start: 2019-02-11 | End: 2019-10-09 | Stop reason: SDUPTHER

## 2019-02-11 RX ORDER — METFORMIN HYDROCHLORIDE 500 MG/1
2000 TABLET, EXTENDED RELEASE ORAL
Qty: 120 TABLET | Refills: 5 | Status: SHIPPED | OUTPATIENT
Start: 2019-02-11 | End: 2019-06-25 | Stop reason: SDUPTHER

## 2019-02-11 RX ORDER — ATORVASTATIN CALCIUM 40 MG/1
40 TABLET, FILM COATED ORAL NIGHTLY
Qty: 30 TABLET | Refills: 5 | Status: SHIPPED | OUTPATIENT
Start: 2019-02-11 | End: 2019-06-25 | Stop reason: SDUPTHER

## 2019-02-11 NOTE — ASSESSMENT & PLAN NOTE
We will increase statin therapy to 40 mg each evening.  Goal LDL less than 70.  Next follow-up 6 months

## 2019-02-11 NOTE — PROGRESS NOTES
"Chief Complaint   Patient presents with   • Diabetes   • Hyperlipidemia       Subjective     Dylan Mosher presents to the office today to refill his medications and review recent labs. No medication side effects are reported.  Cholesterol is stable to improved.  His LDL cholesterol is still above goal of less than 70.  Anxiety is stable with current therapy.  ED symptoms are stable with as needed use of Cialis.  Type 2 diabetes is much improved.  Hemoglobin A1c is less than 7.  Patient has had continual weight loss and we will adjust therapy of Farxiga downward.    I have reviewed and updated his medications, medical history and problem list during today's office visit.      Patient Care Team:  Dylan Paulson MD as PCP - General (Family Medicine)    Social History     Tobacco Use   • Smoking status: Former Smoker     Packs/day: 1.50   • Smokeless tobacco: Never Used   Substance Use Topics   • Alcohol use: Yes     Comment: current some day; 2/wk       Review of Systems   Constitutional: Positive for unexpected weight change. Negative for fatigue.   Cardiovascular: Negative for chest pain.       Objective     /78   Pulse 89   Temp 97.4 °F (36.3 °C) (Oral)   Resp 16   Ht 182.9 cm (72\")   Wt 79.4 kg (175 lb)   BMI 23.73 kg/m²     Body mass index is 23.73 kg/m².    Physical Exam   Constitutional: He is oriented to person, place, and time. He appears well-developed. No distress.   Eyes: Conjunctivae and lids are normal.   Neck: Carotid bruit is not present.   Cardiovascular: Normal rate, regular rhythm and normal heart sounds.   Pulmonary/Chest: Effort normal and breath sounds normal.   Neurological: He is alert and oriented to person, place, and time.   Skin: Skin is warm and dry.   Psychiatric: He has a normal mood and affect. His behavior is normal.   Vitals reviewed.      Data Reviewed:             Lab Results   Component Value Date     (H) 02/09/2019    BUN 12 02/09/2019    CREATININE 1.07 " 02/09/2019    EGFRIFNONA 73 02/09/2019    EGFRIFAFRI 84 02/09/2019     02/09/2019    K 4.5 02/09/2019     02/09/2019    CO2 24 02/09/2019    CALCIUM 9.7 02/09/2019    ALBUMIN 4.8 02/09/2019    LABGLOBREF 2.6 02/09/2019    BILITOT 0.6 02/09/2019    ALKPHOS 83 02/09/2019    AST 30 02/09/2019    ALT 39 02/09/2019    CHLPL 156 02/09/2019    TRIG 119 02/09/2019    HDL 50 02/09/2019    VLDL 24 02/09/2019    LDL 82 02/09/2019    LDLHDL 1.6 02/09/2019    HGBA1C 6.3 (H) 02/09/2019    MICROALBUR <3.0 02/09/2019    WBC 6.6 02/09/2019    RBC 5.01 02/09/2019    HCT 46.3 02/09/2019    MCV 92 02/09/2019    MCH 30.3 02/09/2019    MCHC 32.8 02/09/2019    RDW 14.1 02/09/2019    TSH 1.950 08/11/2018    PSA 1.0 08/11/2018          Assessment/Plan     Problem List Items Addressed This Visit     ED (erectile dysfunction)     Continue same therapy.         Relevant Medications    tadalafil (CIALIS) 20 MG tablet    Other Relevant Orders    PSA DIAGNOSTIC    Anxiety     Continue same therapy         Relevant Medications    buPROPion XL (WELLBUTRIN XL) 300 MG 24 hr tablet    Pure hypercholesterolemia - Primary     We will increase statin therapy to 40 mg each evening.  Goal LDL less than 70.  Next follow-up 6 months         Relevant Medications    atorvastatin (LIPITOR) 40 MG tablet    Other Relevant Orders    Comprehensive Metabolic Panel    CBC & Differential    Lipid Panel With / Chol / HDL Ratio    CK    Type 2 diabetes mellitus without complication, without long-term current use of insulin (CMS/formerly [facility])     Diabetes is improving with treatment.   Medication changes per orders.  Will decrease Farxiga to 5 mg daily due to recent weight loss  Diabetes will be reassessed in 6 months.         Relevant Medications    metFORMIN ER (GLUCOPHAGE-XR) 500 MG 24 hr tablet    dapagliflozin (FARXIGA) 5 MG tablet tablet    Other Relevant Orders    Comprehensive Metabolic Panel    MicroAlbumin, Urine, Random - Urine, Clean Catch    Hemoglobin  A1c    Nocturia     Annual PSA surveillance         Relevant Orders    PSA DIAGNOSTIC          Orders Placed This Encounter   Procedures   • Comprehensive Metabolic Panel     Standing Status:   Future     Standing Expiration Date:   2/11/2020   • Lipid Panel With / Chol / HDL Ratio     Standing Status:   Future     Standing Expiration Date:   2/11/2020   • CK     Standing Status:   Future     Standing Expiration Date:   2/11/2020   • MicroAlbumin, Urine, Random - Urine, Clean Catch     Standing Status:   Future     Standing Expiration Date:   2/11/2020   • PSA DIAGNOSTIC     Standing Status:   Future     Standing Expiration Date:   2/11/2020   • Hemoglobin A1c     Standing Status:   Future     Standing Expiration Date:   2/11/2020   • CBC & Differential     Standing Status:   Future     Standing Expiration Date:   2/11/2020     Order Specific Question:   Manual Differential     Answer:   No         Current Outpatient Medications:   •  aspirin 81 MG EC tablet, Take 81 mg by mouth daily. Take 1 tablet by oral route once daily, Disp: , Rfl:   •  atorvastatin (LIPITOR) 40 MG tablet, Take 1 tablet by mouth Every Night for 180 days., Disp: 30 tablet, Rfl: 5  •  buPROPion XL (WELLBUTRIN XL) 300 MG 24 hr tablet, Take 1 tablet by mouth Every Night for 180 days., Disp: 30 tablet, Rfl: 5  •  EPINEPHrine (EPIPEN) 0.3 MG/0.3ML solution auto-injector injection, , Disp: , Rfl:   •  metFORMIN ER (GLUCOPHAGE-XR) 500 MG 24 hr tablet, Take 4 tablets by mouth Daily With Dinner for 180 days., Disp: 120 tablet, Rfl: 5  •  tadalafil (CIALIS) 20 MG tablet, Take 1 tablet by mouth Daily As Needed for erectile dysfunction for up to 180 days., Disp: 6 tablet, Rfl: 5  •  dapagliflozin (FARXIGA) 5 MG tablet tablet, Take 1 tablet by mouth Daily for 180 days., Disp: 30 tablet, Rfl: 5    Return in about 6 months (around 8/11/2019).

## 2019-02-11 NOTE — ASSESSMENT & PLAN NOTE
Diabetes is improving with treatment.   Medication changes per orders.  Will decrease Farxiga to 5 mg daily due to recent weight loss  Diabetes will be reassessed in 6 months.

## 2019-03-02 DIAGNOSIS — E11.9 TYPE 2 DIABETES MELLITUS WITHOUT COMPLICATION, WITHOUT LONG-TERM CURRENT USE OF INSULIN (HCC): ICD-10-CM

## 2019-03-04 RX ORDER — DAPAGLIFLOZIN 10 MG/1
TABLET, FILM COATED ORAL
Qty: 30 TABLET | Refills: 5 | OUTPATIENT
Start: 2019-03-04

## 2019-03-09 DIAGNOSIS — E11.9 TYPE 2 DIABETES MELLITUS WITHOUT COMPLICATION, WITHOUT LONG-TERM CURRENT USE OF INSULIN (HCC): ICD-10-CM

## 2019-03-12 RX ORDER — DAPAGLIFLOZIN 10 MG/1
TABLET, FILM COATED ORAL
Qty: 30 TABLET | Refills: 5 | OUTPATIENT
Start: 2019-03-12

## 2019-04-06 DIAGNOSIS — E78.00 PURE HYPERCHOLESTEROLEMIA: ICD-10-CM

## 2019-04-08 RX ORDER — ATORVASTATIN CALCIUM 20 MG/1
20 TABLET, FILM COATED ORAL NIGHTLY
Qty: 30 TABLET | Refills: 5 | OUTPATIENT
Start: 2019-04-08 | End: 2019-10-05

## 2019-05-30 DIAGNOSIS — N52.9 ERECTILE DYSFUNCTION, UNSPECIFIED ERECTILE DYSFUNCTION TYPE: ICD-10-CM

## 2019-05-30 RX ORDER — TADALAFIL 20 MG/1
20 TABLET ORAL DAILY PRN
Qty: 6 TABLET | Refills: 5 | Status: SHIPPED | OUTPATIENT
Start: 2019-05-30 | End: 2019-08-27 | Stop reason: SDUPTHER

## 2019-06-25 DIAGNOSIS — E78.00 PURE HYPERCHOLESTEROLEMIA: ICD-10-CM

## 2019-06-25 DIAGNOSIS — E11.9 TYPE 2 DIABETES MELLITUS WITHOUT COMPLICATION, WITHOUT LONG-TERM CURRENT USE OF INSULIN (HCC): ICD-10-CM

## 2019-06-25 RX ORDER — METFORMIN HYDROCHLORIDE 500 MG/1
2000 TABLET, EXTENDED RELEASE ORAL
Qty: 120 TABLET | Refills: 0 | Status: SHIPPED | OUTPATIENT
Start: 2019-06-25 | End: 2019-08-27 | Stop reason: SDUPTHER

## 2019-06-25 RX ORDER — ATORVASTATIN CALCIUM 40 MG/1
40 TABLET, FILM COATED ORAL NIGHTLY
Qty: 30 TABLET | Refills: 0 | Status: SHIPPED | OUTPATIENT
Start: 2019-06-25 | End: 2019-08-03 | Stop reason: SDUPTHER

## 2019-06-27 DIAGNOSIS — F41.9 ANXIETY: ICD-10-CM

## 2019-06-27 RX ORDER — BUPROPION HYDROCHLORIDE 300 MG/1
300 TABLET ORAL NIGHTLY
Qty: 90 TABLET | Refills: 0 | Status: SHIPPED | OUTPATIENT
Start: 2019-06-27 | End: 2019-08-27 | Stop reason: SDUPTHER

## 2019-07-11 ENCOUNTER — RESULTS ENCOUNTER (OUTPATIENT)
Dept: FAMILY MEDICINE CLINIC | Facility: CLINIC | Age: 64
End: 2019-07-11

## 2019-07-11 DIAGNOSIS — R35.1 NOCTURIA: ICD-10-CM

## 2019-07-11 DIAGNOSIS — N52.9 ERECTILE DYSFUNCTION, UNSPECIFIED ERECTILE DYSFUNCTION TYPE: ICD-10-CM

## 2019-07-11 DIAGNOSIS — E11.9 TYPE 2 DIABETES MELLITUS WITHOUT COMPLICATION, WITHOUT LONG-TERM CURRENT USE OF INSULIN (HCC): ICD-10-CM

## 2019-07-11 DIAGNOSIS — E78.00 PURE HYPERCHOLESTEROLEMIA: ICD-10-CM

## 2019-07-23 DIAGNOSIS — E78.00 PURE HYPERCHOLESTEROLEMIA: ICD-10-CM

## 2019-07-23 RX ORDER — ATORVASTATIN CALCIUM 40 MG/1
40 TABLET, FILM COATED ORAL NIGHTLY
Qty: 30 TABLET | Refills: 0 | OUTPATIENT
Start: 2019-07-23 | End: 2020-01-19

## 2019-07-27 DIAGNOSIS — E11.9 TYPE 2 DIABETES MELLITUS WITHOUT COMPLICATION, WITHOUT LONG-TERM CURRENT USE OF INSULIN (HCC): ICD-10-CM

## 2019-07-29 RX ORDER — DAPAGLIFLOZIN 5 MG/1
TABLET, FILM COATED ORAL
Qty: 30 TABLET | Refills: 0 | Status: SHIPPED | OUTPATIENT
Start: 2019-07-29 | End: 2019-08-27 | Stop reason: SDUPTHER

## 2019-08-03 DIAGNOSIS — E78.00 PURE HYPERCHOLESTEROLEMIA: ICD-10-CM

## 2019-08-05 RX ORDER — ATORVASTATIN CALCIUM 40 MG/1
40 TABLET, FILM COATED ORAL NIGHTLY
Qty: 30 TABLET | Refills: 0 | Status: SHIPPED | OUTPATIENT
Start: 2019-08-05 | End: 2019-08-27 | Stop reason: SDUPTHER

## 2019-08-25 LAB
ALBUMIN SERPL-MCNC: 4.9 G/DL (ref 3.6–4.8)
ALBUMIN/GLOB SERPL: 1.7 {RATIO} (ref 1.2–2.2)
ALP SERPL-CCNC: 98 IU/L (ref 39–117)
ALT SERPL-CCNC: 31 IU/L (ref 0–44)
AST SERPL-CCNC: 29 IU/L (ref 0–40)
BASOPHILS # BLD AUTO: 0.1 X10E3/UL (ref 0–0.2)
BASOPHILS NFR BLD AUTO: 1 %
BILIRUB SERPL-MCNC: 0.5 MG/DL (ref 0–1.2)
BUN SERPL-MCNC: 16 MG/DL (ref 8–27)
BUN/CREAT SERPL: 14 (ref 10–24)
CALCIUM SERPL-MCNC: 10.2 MG/DL (ref 8.6–10.2)
CHLORIDE SERPL-SCNC: 99 MMOL/L (ref 96–106)
CHOLEST SERPL-MCNC: 130 MG/DL (ref 100–199)
CHOLEST/HDLC SERPL: 2.5 RATIO (ref 0–5)
CK SERPL-CCNC: 73 U/L (ref 24–204)
CO2 SERPL-SCNC: 26 MMOL/L (ref 20–29)
CREAT SERPL-MCNC: 1.17 MG/DL (ref 0.76–1.27)
EOSINOPHIL # BLD AUTO: 0.3 X10E3/UL (ref 0–0.4)
EOSINOPHIL NFR BLD AUTO: 4 %
ERYTHROCYTE [DISTWIDTH] IN BLOOD BY AUTOMATED COUNT: 14 % (ref 12.3–15.4)
GLOBULIN SER CALC-MCNC: 2.9 G/DL (ref 1.5–4.5)
GLUCOSE SERPL-MCNC: 100 MG/DL (ref 65–99)
HBA1C MFR BLD: 6.2 % (ref 4.8–5.6)
HCT VFR BLD AUTO: 47.3 % (ref 37.5–51)
HDLC SERPL-MCNC: 53 MG/DL
HGB BLD-MCNC: 15.9 G/DL (ref 13–17.7)
IMM GRANULOCYTES # BLD AUTO: 0 X10E3/UL (ref 0–0.1)
IMM GRANULOCYTES NFR BLD AUTO: 0 %
LDLC SERPL CALC-MCNC: 57 MG/DL (ref 0–99)
LYMPHOCYTES # BLD AUTO: 1.6 X10E3/UL (ref 0.7–3.1)
LYMPHOCYTES NFR BLD AUTO: 23 %
MCH RBC QN AUTO: 30.5 PG (ref 26.6–33)
MCHC RBC AUTO-ENTMCNC: 33.6 G/DL (ref 31.5–35.7)
MCV RBC AUTO: 91 FL (ref 79–97)
MICROALBUMIN UR-MCNC: 3.3 UG/ML
MONOCYTES # BLD AUTO: 0.6 X10E3/UL (ref 0.1–0.9)
MONOCYTES NFR BLD AUTO: 9 %
NEUTROPHILS # BLD AUTO: 4.4 X10E3/UL (ref 1.4–7)
NEUTROPHILS NFR BLD AUTO: 63 %
PLATELET # BLD AUTO: 278 X10E3/UL (ref 150–450)
POTASSIUM SERPL-SCNC: 4.8 MMOL/L (ref 3.5–5.2)
PROT SERPL-MCNC: 7.8 G/DL (ref 6–8.5)
PSA SERPL-MCNC: 0.6 NG/ML (ref 0–4)
RBC # BLD AUTO: 5.21 X10E6/UL (ref 4.14–5.8)
SODIUM SERPL-SCNC: 141 MMOL/L (ref 134–144)
TRIGL SERPL-MCNC: 101 MG/DL (ref 0–149)
VLDLC SERPL CALC-MCNC: 20 MG/DL (ref 5–40)
WBC # BLD AUTO: 7 X10E3/UL (ref 3.4–10.8)

## 2019-08-27 ENCOUNTER — OFFICE VISIT (OUTPATIENT)
Dept: FAMILY MEDICINE CLINIC | Facility: CLINIC | Age: 64
End: 2019-08-27

## 2019-08-27 VITALS
HEART RATE: 86 BPM | RESPIRATION RATE: 16 BRPM | BODY MASS INDEX: 23.43 KG/M2 | WEIGHT: 173 LBS | HEIGHT: 72 IN | DIASTOLIC BLOOD PRESSURE: 60 MMHG | OXYGEN SATURATION: 99 % | SYSTOLIC BLOOD PRESSURE: 100 MMHG | TEMPERATURE: 97.8 F

## 2019-08-27 DIAGNOSIS — N52.9 ERECTILE DYSFUNCTION, UNSPECIFIED ERECTILE DYSFUNCTION TYPE: ICD-10-CM

## 2019-08-27 DIAGNOSIS — E11.9 TYPE 2 DIABETES MELLITUS WITHOUT COMPLICATION, WITHOUT LONG-TERM CURRENT USE OF INSULIN (HCC): ICD-10-CM

## 2019-08-27 DIAGNOSIS — E78.00 PURE HYPERCHOLESTEROLEMIA: ICD-10-CM

## 2019-08-27 DIAGNOSIS — F41.9 ANXIETY: ICD-10-CM

## 2019-08-27 PROCEDURE — 99214 OFFICE O/P EST MOD 30 MIN: CPT | Performed by: NURSE PRACTITIONER

## 2019-08-27 RX ORDER — ATORVASTATIN CALCIUM 40 MG/1
40 TABLET, FILM COATED ORAL NIGHTLY
Qty: 90 TABLET | Refills: 1 | Status: SHIPPED | OUTPATIENT
Start: 2019-08-27 | End: 2020-06-15 | Stop reason: SDUPTHER

## 2019-08-27 RX ORDER — TADALAFIL 20 MG/1
20 TABLET ORAL DAILY PRN
Qty: 6 TABLET | Refills: 5 | Status: SHIPPED | OUTPATIENT
Start: 2019-08-27 | End: 2019-12-10 | Stop reason: SDUPTHER

## 2019-08-27 RX ORDER — BUPROPION HYDROCHLORIDE 300 MG/1
300 TABLET ORAL NIGHTLY
Qty: 90 TABLET | Refills: 1 | Status: SHIPPED | OUTPATIENT
Start: 2019-08-27 | End: 2020-03-23 | Stop reason: SDUPTHER

## 2019-08-27 RX ORDER — METFORMIN HYDROCHLORIDE 500 MG/1
2000 TABLET, EXTENDED RELEASE ORAL
Qty: 360 TABLET | Refills: 1 | Status: SHIPPED | OUTPATIENT
Start: 2019-08-27 | End: 2020-03-23 | Stop reason: SDUPTHER

## 2019-08-27 NOTE — PROGRESS NOTES
Subjective   Dylan Mosher is a 63 y.o. male.     History of Present Illness    Since the last visit, he has overall felt well.  He has DMII well controlled on medication and will continue regimen, Hyperlipidemia with goals met with current Rx and anxiety which is well controlled on Wellbutrin .  he has been compliant with current medications have reviewed them.  The patient denies medication side effects.  Will refill medications.    Results for orders placed or performed in visit on 07/11/19   Comprehensive Metabolic Panel   Result Value Ref Range    Glucose 100 (H) 65 - 99 mg/dL    BUN 16 8 - 27 mg/dL    Creatinine 1.17 0.76 - 1.27 mg/dL    eGFR Non African Am 65 >59 mL/min/1.73    eGFR African Am 76 >59 mL/min/1.73    BUN/Creatinine Ratio 14 10 - 24    Sodium 141 134 - 144 mmol/L    Potassium 4.8 3.5 - 5.2 mmol/L    Chloride 99 96 - 106 mmol/L    Total CO2 26 20 - 29 mmol/L    Calcium 10.2 8.6 - 10.2 mg/dL    Total Protein 7.8 6.0 - 8.5 g/dL    Albumin 4.9 (H) 3.6 - 4.8 g/dL    Globulin 2.9 1.5 - 4.5 g/dL    A/G Ratio 1.7 1.2 - 2.2    Total Bilirubin 0.5 0.0 - 1.2 mg/dL    Alkaline Phosphatase 98 39 - 117 IU/L    AST (SGOT) 29 0 - 40 IU/L    ALT (SGPT) 31 0 - 44 IU/L   Lipid Panel With / Chol / HDL Ratio   Result Value Ref Range    Total Cholesterol 130 100 - 199 mg/dL    Triglycerides 101 0 - 149 mg/dL    HDL Cholesterol 53 >39 mg/dL    VLDL Cholesterol 20 5 - 40 mg/dL    LDL Cholesterol  57 0 - 99 mg/dL    Chol/HDL Ratio 2.5 0.0 - 5.0 ratio   CK   Result Value Ref Range    Creatine Kinase 73 24 - 204 U/L   MicroAlbumin, Urine, Random - Urine, Clean Catch   Result Value Ref Range    Microalbumin, Urine 3.3 Not Estab. ug/mL   PSA DIAGNOSTIC   Result Value Ref Range    PSA 0.6 0.0 - 4.0 ng/mL   Hemoglobin A1c   Result Value Ref Range    Hemoglobin A1C 6.2 (H) 4.8 - 5.6 %   CBC & Differential   Result Value Ref Range    WBC 7.0 3.4 - 10.8 x10E3/uL    RBC 5.21 4.14 - 5.80 x10E6/uL    Hemoglobin 15.9 13.0 - 17.7  g/dL    Hematocrit 47.3 37.5 - 51.0 %    MCV 91 79 - 97 fL    MCH 30.5 26.6 - 33.0 pg    MCHC 33.6 31.5 - 35.7 g/dL    RDW 14.0 12.3 - 15.4 %    Platelets 278 150 - 450 x10E3/uL    Neutrophil Rel % 63 Not Estab. %    Lymphocyte Rel % 23 Not Estab. %    Monocyte Rel % 9 Not Estab. %    Eosinophil Rel % 4 Not Estab. %    Basophil Rel % 1 Not Estab. %    Neutrophils Absolute 4.4 1.4 - 7.0 x10E3/uL    Lymphocytes Absolute 1.6 0.7 - 3.1 x10E3/uL    Monocytes Absolute 0.6 0.1 - 0.9 x10E3/uL    Eosinophils Absolute 0.3 0.0 - 0.4 x10E3/uL    Basophils Absolute 0.1 0.0 - 0.2 x10E3/uL    Immature Granulocyte Rel % 0 Not Estab. %    Immature Grans Absolute 0.0 0.0 - 0.1 x10E3/uL       Had eye exam last October and is scheduled again for this October.     Labs reviewed with pt today during visit. All questions answered.    The following portions of the patient's history were reviewed and updated as appropriate: allergies, current medications, past family history, past medical history, past social history, past surgical history and problem list.    Review of Systems   Constitutional: Negative for fatigue.   Eyes: Negative for visual disturbance.   Respiratory: Negative for cough and shortness of breath.    Cardiovascular: Negative for chest pain and palpitations.   Endocrine: Negative for cold intolerance, heat intolerance, polydipsia, polyphagia and polyuria.   Skin: Negative for rash.   Neurological: Negative for dizziness, light-headedness and headaches.   Psychiatric/Behavioral: Negative for dysphoric mood and sleep disturbance. The patient is not nervous/anxious.        Objective   Physical Exam    Assessment/Plan   There are no diagnoses linked to this encounter.

## 2019-09-25 ENCOUNTER — INPATIENT HOSPITAL (OUTPATIENT)
Dept: URBAN - METROPOLITAN AREA HOSPITAL 107 | Facility: HOSPITAL | Age: 64
End: 2019-09-25

## 2019-09-25 DIAGNOSIS — K57.30 DIVERTICULOSIS OF LARGE INTESTINE WITHOUT PERFORATION OR ABS: ICD-10-CM

## 2019-09-25 DIAGNOSIS — R10.9 UNSPECIFIED ABDOMINAL PAIN: ICD-10-CM

## 2019-09-25 DIAGNOSIS — R19.5 OTHER FECAL ABNORMALITIES: ICD-10-CM

## 2019-09-25 DIAGNOSIS — R93.3 ABNORMAL FINDINGS ON DIAGNOSTIC IMAGING OF OTHER PARTS OF DI: ICD-10-CM

## 2019-09-25 DIAGNOSIS — R19.7 DIARRHEA, UNSPECIFIED: ICD-10-CM

## 2019-09-25 PROCEDURE — 99254 IP/OBS CNSLTJ NEW/EST MOD 60: CPT | Performed by: INTERNAL MEDICINE

## 2019-09-26 ENCOUNTER — INPATIENT HOSPITAL (OUTPATIENT)
Dept: URBAN - METROPOLITAN AREA HOSPITAL 107 | Facility: HOSPITAL | Age: 64
End: 2019-09-26

## 2019-09-26 DIAGNOSIS — K57.30 DIVERTICULOSIS OF LARGE INTESTINE WITHOUT PERFORATION OR ABS: ICD-10-CM

## 2019-09-26 DIAGNOSIS — K92.1 MELENA: ICD-10-CM

## 2019-09-26 DIAGNOSIS — K92.2 GASTROINTESTINAL HEMORRHAGE, UNSPECIFIED: ICD-10-CM

## 2019-09-26 PROCEDURE — 45378 DIAGNOSTIC COLONOSCOPY: CPT

## 2019-10-02 ENCOUNTER — TELEPHONE (OUTPATIENT)
Dept: FAMILY MEDICINE CLINIC | Facility: CLINIC | Age: 64
End: 2019-10-02

## 2019-10-08 PROBLEM — D62 ACUTE BLOOD LOSS ANEMIA: Status: ACTIVE | Noted: 2019-09-27

## 2019-10-08 PROBLEM — E11.9 T2DM (TYPE 2 DIABETES MELLITUS): Status: ACTIVE | Noted: 2019-09-25

## 2019-10-08 PROBLEM — K52.9 COLITIS: Status: ACTIVE | Noted: 2019-09-25

## 2019-10-08 PROBLEM — K62.5 RECTAL BLEED: Status: ACTIVE | Noted: 2019-09-25

## 2019-10-08 PROBLEM — E78.5 HLD (HYPERLIPIDEMIA): Status: ACTIVE | Noted: 2019-09-25

## 2019-10-09 ENCOUNTER — OFFICE VISIT (OUTPATIENT)
Dept: FAMILY MEDICINE CLINIC | Facility: CLINIC | Age: 64
End: 2019-10-09

## 2019-10-09 VITALS
RESPIRATION RATE: 16 BRPM | SYSTOLIC BLOOD PRESSURE: 131 MMHG | BODY MASS INDEX: 23.03 KG/M2 | HEART RATE: 81 BPM | WEIGHT: 170 LBS | OXYGEN SATURATION: 96 % | DIASTOLIC BLOOD PRESSURE: 84 MMHG | HEIGHT: 72 IN

## 2019-10-09 DIAGNOSIS — Z87.19 HISTORY OF DIVERTICULOSIS: ICD-10-CM

## 2019-10-09 DIAGNOSIS — K62.5 RECTAL BLEED: ICD-10-CM

## 2019-10-09 DIAGNOSIS — I70.0 AORTIC ATHEROSCLEROSIS (HCC): ICD-10-CM

## 2019-10-09 DIAGNOSIS — I25.10 CORONARY ARTERY DISEASE INVOLVING NATIVE CORONARY ARTERY OF NATIVE HEART WITHOUT ANGINA PECTORIS: ICD-10-CM

## 2019-10-09 DIAGNOSIS — D62 ACUTE BLOOD LOSS ANEMIA: ICD-10-CM

## 2019-10-09 DIAGNOSIS — E78.49 OTHER HYPERLIPIDEMIA: ICD-10-CM

## 2019-10-09 DIAGNOSIS — Z09 HOSPITAL DISCHARGE FOLLOW-UP: Primary | ICD-10-CM

## 2019-10-09 PROBLEM — K52.9 COLITIS: Status: RESOLVED | Noted: 2019-09-25 | Resolved: 2019-10-09

## 2019-10-09 PROCEDURE — 99214 OFFICE O/P EST MOD 30 MIN: CPT | Performed by: FAMILY MEDICINE

## 2019-10-09 NOTE — PROGRESS NOTES
"Chief Complaint:   Subjective    Rooming Tab(CC,VS,Pt Hx,Fall Screen)   Chief Complaint   Patient presents with   • Diverticulitis     hospital follow    • hosptial follow up     bleeding          History of Present Illness   Dylan Mosher is a 64 y.o. male who presents to the office today for hospital follow-up.  He was in [facility] from September 25 through September 28.  Diagnosis was rectal bleed due to diverticulosis.  Labs also revealed blood loss anemia.  He still has some dizziness from that condition but it is improving.  He is due for follow-up labs.  He did have colonoscopy and was requested to repeat that in 5 years.  Health maintenance topic has been changed to reflect that.  During the hospital stay he had additional studies which incidentally found aortic atherosclerosis and coronary artery disease that is asymptomatic.  Patient is taking aspirin and no further bleeding episodes noted.    I have reviewed and updated his medications, medical history and problem list during today's office visit.     Problem List Tab  Medications Tab  Synopsis Tab  Chart Review Tab  Care Everywhere Tab  Immunizations Tab  Patient History Tab  Social History     Tobacco Use   • Smoking status: Former Smoker     Packs/day: 1.50   • Smokeless tobacco: Never Used   Substance Use Topics   • Alcohol use: Yes     Comment: current some day; 2/wk       Review of Systems   Respiratory: Negative for shortness of breath.    Gastrointestinal: Negative for abdominal pain, blood in stool and rectal pain.   Neurological: Positive for light-headedness.         Physical Examination:   Objective   Rooming Tab(CC,VS,Pt Hx,Fall Screen)  /84   Pulse 81   Resp 16   Ht 182.9 cm (72.01\")   Wt 77.1 kg (170 lb)   SpO2 96%   BMI 23.05 kg/m²     Body mass index is 23.05 kg/m².    Physical Exam   Constitutional: He is oriented to person, place, and time. He appears well-developed. No distress.   Eyes: Conjunctivae and lids are " normal.   Neck: Carotid bruit is not present.   Cardiovascular: Normal rate, regular rhythm and normal heart sounds.   Pulmonary/Chest: Effort normal and breath sounds normal.   Neurological: He is alert and oriented to person, place, and time.   Skin: Skin is warm and dry.   Psychiatric: He has a normal mood and affect. His behavior is normal.   Vitals reviewed.       Data Reviewed:              Lab Results   Component Value Date     (H) 09/26/2019    BUN 15 09/26/2019    CREATININE 0.8 09/26/2019    EGFRIFNONA 65 08/24/2019    EGFRIFAFRI 76 08/24/2019     09/26/2019    K 3.5 09/26/2019     09/26/2019    CALCIUM 8.6 09/26/2019    ALBUMIN 3.6 09/26/2019    BILITOT 0.7 09/26/2019    ALKPHOS 60 09/26/2019    AST 29 09/26/2019    ALT 39 09/26/2019    CHLPL 130 08/24/2019    TRIG 101 08/24/2019    HDL 53 08/24/2019    VLDL 20 08/24/2019    LDL 57 08/24/2019    CKTOTAL 73 08/24/2019    MICROALBUR 3.3 08/24/2019    WBC 6.24 09/28/2019    RBC 2.80 (L) 09/28/2019    HCT 27.2 (L) 09/28/2019    MCV 91.8 09/28/2019    MCH 30.4 09/28/2019    PSA 0.6 08/24/2019    INR 1.1 09/25/2019          Assessment/Plan:   Assessment/Plan   Order Review Tab  Health Maintenance Tab  Patient Plan/Order Tab  Diagnoses and all orders for this visit:    1. Hospital discharge follow-up (Primary)    2. Rectal bleed    3. Coronary artery disease involving native coronary artery of native heart without angina pectoris  Assessment & Plan:  Coronary artery disease is newly identified.  Continue current treatment regimen.  Cardiac status will be reassessed in 6 months.    Orders:  -     Comprehensive Metabolic Panel  -     CBC & Differential  -     Lipid Panel With / Chol / HDL Ratio  -     CK    4. Aortic atherosclerosis (CMS/HCC)  Assessment & Plan:  New problem. LDL cholesterol should be below 70      5. History of diverticulosis with bleeding    6. Acute blood loss anemia  Assessment & Plan:  Labs due      Orders:  -     CBC &  Differential  -     Iron Profile    7. Other hyperlipidemia  -     Lipid Panel With / Chol / HDL Ratio  -     CK     Follow up:   Wrapup Tab  Return in about 4 months (around 2/9/2020), or if symptoms worsen or fail to improve, for Recheck.

## 2019-10-09 NOTE — ASSESSMENT & PLAN NOTE
Coronary artery disease is newly identified.  Continue current treatment regimen.  Cardiac status will be reassessed in 6 months.

## 2019-10-10 LAB
ALBUMIN SERPL-MCNC: 4.8 G/DL (ref 3.5–5.2)
ALBUMIN/GLOB SERPL: 2 G/DL
ALP SERPL-CCNC: 85 U/L (ref 39–117)
ALT SERPL-CCNC: 29 U/L (ref 1–41)
AST SERPL-CCNC: 25 U/L (ref 1–40)
BASOPHILS # BLD AUTO: 0.06 10*3/MM3 (ref 0–0.2)
BASOPHILS NFR BLD AUTO: 1.1 % (ref 0–1.5)
BILIRUB SERPL-MCNC: 0.5 MG/DL (ref 0.2–1.2)
BUN SERPL-MCNC: 18 MG/DL (ref 8–23)
BUN/CREAT SERPL: 18.6 (ref 7–25)
CALCIUM SERPL-MCNC: 9.6 MG/DL (ref 8.6–10.5)
CHLORIDE SERPL-SCNC: 103 MMOL/L (ref 98–107)
CHOLEST SERPL-MCNC: 116 MG/DL (ref 0–200)
CHOLEST/HDLC SERPL: 2.23 {RATIO}
CK SERPL-CCNC: 52 U/L (ref 20–200)
CO2 SERPL-SCNC: 28.3 MMOL/L (ref 22–29)
CREAT SERPL-MCNC: 0.97 MG/DL (ref 0.76–1.27)
EOSINOPHIL # BLD AUTO: 0.23 10*3/MM3 (ref 0–0.4)
EOSINOPHIL NFR BLD AUTO: 4 % (ref 0.3–6.2)
ERYTHROCYTE [DISTWIDTH] IN BLOOD BY AUTOMATED COUNT: 14.3 % (ref 12.3–15.4)
GLOBULIN SER CALC-MCNC: 2.4 GM/DL
GLUCOSE SERPL-MCNC: 95 MG/DL (ref 65–99)
HCT VFR BLD AUTO: 34.1 % (ref 37.5–51)
HDLC SERPL-MCNC: 52 MG/DL (ref 40–60)
HGB BLD-MCNC: 11 G/DL (ref 13–17.7)
IMM GRANULOCYTES # BLD AUTO: 0.03 10*3/MM3 (ref 0–0.05)
IMM GRANULOCYTES NFR BLD AUTO: 0.5 % (ref 0–0.5)
IRON SATN MFR SERPL: 27 % (ref 20–50)
IRON SERPL-MCNC: 129 MCG/DL (ref 59–158)
LDLC SERPL CALC-MCNC: 45 MG/DL (ref 0–100)
LYMPHOCYTES # BLD AUTO: 1.07 10*3/MM3 (ref 0.7–3.1)
LYMPHOCYTES NFR BLD AUTO: 18.7 % (ref 19.6–45.3)
MCH RBC QN AUTO: 30.4 PG (ref 26.6–33)
MCHC RBC AUTO-ENTMCNC: 32.3 G/DL (ref 31.5–35.7)
MCV RBC AUTO: 94.2 FL (ref 79–97)
MONOCYTES # BLD AUTO: 0.53 10*3/MM3 (ref 0.1–0.9)
MONOCYTES NFR BLD AUTO: 9.3 % (ref 5–12)
NEUTROPHILS # BLD AUTO: 3.79 10*3/MM3 (ref 1.7–7)
NEUTROPHILS NFR BLD AUTO: 66.4 % (ref 42.7–76)
NRBC BLD AUTO-RTO: 0.2 /100 WBC (ref 0–0.2)
PLATELET # BLD AUTO: 390 10*3/MM3 (ref 140–450)
POTASSIUM SERPL-SCNC: 4.4 MMOL/L (ref 3.5–5.2)
PROT SERPL-MCNC: 7.2 G/DL (ref 6–8.5)
RBC # BLD AUTO: 3.62 10*6/MM3 (ref 4.14–5.8)
SODIUM SERPL-SCNC: 143 MMOL/L (ref 136–145)
TIBC SERPL-MCNC: 477 MCG/DL
TRIGL SERPL-MCNC: 96 MG/DL (ref 0–150)
UIBC SERPL-MCNC: 348 MCG/DL (ref 112–346)
VLDLC SERPL CALC-MCNC: 19.2 MG/DL
WBC # BLD AUTO: 5.71 10*3/MM3 (ref 3.4–10.8)

## 2019-10-14 NOTE — PROGRESS NOTES
Please accept these satisfactory lab results.  Blood loss anemia is improving.  (Elevation of hemoglobin) I think you should take over-the-counter iron sulfate tablet with your supper meal( which is usually 325 mg once daily).  Make an appointment to see me in about early January for reevaluation of this condition.  Contact me if you have any further questions.                                                    Dr. Paulson

## 2019-12-04 ENCOUNTER — TELEPHONE (OUTPATIENT)
Dept: FAMILY MEDICINE CLINIC | Facility: CLINIC | Age: 64
End: 2019-12-04

## 2019-12-04 DIAGNOSIS — N52.9 ERECTILE DYSFUNCTION, UNSPECIFIED ERECTILE DYSFUNCTION TYPE: ICD-10-CM

## 2019-12-04 NOTE — TELEPHONE ENCOUNTER
Pt calls and ask if he can get a 30 day rx for his Cialis he states that it will only cost him $20

## 2019-12-04 NOTE — TELEPHONE ENCOUNTER
Okay to send in 60 days worth of this medication 5 mg daily since he has both conditions of ED and nocturia.  It is not proper to prescribe 20 mg daily.  Call him and let him know.

## 2019-12-10 RX ORDER — TADALAFIL 20 MG/1
20 TABLET ORAL DAILY PRN
Qty: 30 TABLET | Refills: 1 | Status: SHIPPED | OUTPATIENT
Start: 2019-12-10 | End: 2021-02-17 | Stop reason: SDUPTHER

## 2019-12-10 RX ORDER — TADALAFIL 20 MG/1
20 TABLET ORAL DAILY PRN
Qty: 30 TABLET | Refills: 1 | Status: SHIPPED | OUTPATIENT
Start: 2019-12-10 | End: 2019-12-10

## 2020-03-09 DIAGNOSIS — F41.9 ANXIETY: ICD-10-CM

## 2020-03-09 DIAGNOSIS — E11.9 TYPE 2 DIABETES MELLITUS WITHOUT COMPLICATION, WITHOUT LONG-TERM CURRENT USE OF INSULIN (HCC): ICD-10-CM

## 2020-03-09 DIAGNOSIS — E78.00 PURE HYPERCHOLESTEROLEMIA: ICD-10-CM

## 2020-03-10 RX ORDER — ATORVASTATIN CALCIUM 40 MG/1
40 TABLET, FILM COATED ORAL NIGHTLY
Qty: 30 TABLET | Refills: 0 | OUTPATIENT
Start: 2020-03-10 | End: 2020-09-06

## 2020-03-10 RX ORDER — BUPROPION HYDROCHLORIDE 300 MG/1
300 TABLET ORAL NIGHTLY
Qty: 30 TABLET | Refills: 0 | OUTPATIENT
Start: 2020-03-10 | End: 2020-09-06

## 2020-03-10 RX ORDER — METFORMIN HYDROCHLORIDE 500 MG/1
2000 TABLET, EXTENDED RELEASE ORAL
Qty: 120 TABLET | Refills: 0 | OUTPATIENT
Start: 2020-03-10 | End: 2020-09-06

## 2020-03-12 ENCOUNTER — TELEPHONE (OUTPATIENT)
Dept: FAMILY MEDICINE CLINIC | Facility: CLINIC | Age: 65
End: 2020-03-12

## 2020-03-12 DIAGNOSIS — E11.9 TYPE 2 DIABETES MELLITUS WITHOUT COMPLICATION, WITHOUT LONG-TERM CURRENT USE OF INSULIN (HCC): ICD-10-CM

## 2020-03-12 DIAGNOSIS — I25.10 CORONARY ARTERY DISEASE INVOLVING NATIVE CORONARY ARTERY OF NATIVE HEART WITHOUT ANGINA PECTORIS: ICD-10-CM

## 2020-03-12 DIAGNOSIS — E78.49 OTHER HYPERLIPIDEMIA: ICD-10-CM

## 2020-03-12 DIAGNOSIS — E78.00 PURE HYPERCHOLESTEROLEMIA: Primary | ICD-10-CM

## 2020-03-15 LAB
ALBUMIN SERPL-MCNC: 5 G/DL (ref 3.8–4.8)
ALBUMIN/GLOB SERPL: 2 {RATIO} (ref 1.2–2.2)
ALP SERPL-CCNC: 87 IU/L (ref 39–117)
ALT SERPL-CCNC: 32 IU/L (ref 0–44)
AST SERPL-CCNC: 23 IU/L (ref 0–40)
BASOPHILS # BLD AUTO: 0.1 X10E3/UL (ref 0–0.2)
BASOPHILS NFR BLD AUTO: 1 %
BILIRUB SERPL-MCNC: 0.7 MG/DL (ref 0–1.2)
BUN SERPL-MCNC: 15 MG/DL (ref 8–27)
BUN/CREAT SERPL: 12 (ref 10–24)
CALCIUM SERPL-MCNC: 9.8 MG/DL (ref 8.6–10.2)
CHLORIDE SERPL-SCNC: 103 MMOL/L (ref 96–106)
CHOLEST SERPL-MCNC: 137 MG/DL (ref 100–199)
CO2 SERPL-SCNC: 26 MMOL/L (ref 20–29)
CREAT SERPL-MCNC: 1.25 MG/DL (ref 0.76–1.27)
EOSINOPHIL # BLD AUTO: 0.2 X10E3/UL (ref 0–0.4)
EOSINOPHIL NFR BLD AUTO: 4 %
ERYTHROCYTE [DISTWIDTH] IN BLOOD BY AUTOMATED COUNT: 15.9 % (ref 11.6–15.4)
GLOBULIN SER CALC-MCNC: 2.5 G/DL (ref 1.5–4.5)
GLUCOSE SERPL-MCNC: 93 MG/DL (ref 65–99)
HBA1C MFR BLD: 6 % (ref 4.8–5.6)
HCT VFR BLD AUTO: 46.2 % (ref 37.5–51)
HDLC SERPL-MCNC: 54 MG/DL
HGB BLD-MCNC: 15 G/DL (ref 13–17.7)
IMM GRANULOCYTES # BLD AUTO: 0 X10E3/UL (ref 0–0.1)
IMM GRANULOCYTES NFR BLD AUTO: 0 %
LDLC SERPL CALC-MCNC: 61 MG/DL (ref 0–99)
LDLC/HDLC SERPL: 1.1 RATIO (ref 0–3.6)
LYMPHOCYTES # BLD AUTO: 1.4 X10E3/UL (ref 0.7–3.1)
LYMPHOCYTES NFR BLD AUTO: 25 %
MCH RBC QN AUTO: 29.6 PG (ref 26.6–33)
MCHC RBC AUTO-ENTMCNC: 32.5 G/DL (ref 31.5–35.7)
MCV RBC AUTO: 91 FL (ref 79–97)
MICROALBUMIN UR-MCNC: 3.8 UG/ML
MONOCYTES # BLD AUTO: 0.6 X10E3/UL (ref 0.1–0.9)
MONOCYTES NFR BLD AUTO: 12 %
NEUTROPHILS # BLD AUTO: 3.3 X10E3/UL (ref 1.4–7)
NEUTROPHILS NFR BLD AUTO: 58 %
PLATELET # BLD AUTO: 277 X10E3/UL (ref 150–450)
POTASSIUM SERPL-SCNC: 4.6 MMOL/L (ref 3.5–5.2)
PROT SERPL-MCNC: 7.5 G/DL (ref 6–8.5)
RBC # BLD AUTO: 5.07 X10E6/UL (ref 4.14–5.8)
SODIUM SERPL-SCNC: 144 MMOL/L (ref 134–144)
TRIGL SERPL-MCNC: 109 MG/DL (ref 0–149)
TSH SERPL DL<=0.005 MIU/L-ACNC: 2.09 UIU/ML (ref 0.45–4.5)
VLDLC SERPL CALC-MCNC: 22 MG/DL (ref 5–40)
WBC # BLD AUTO: 5.6 X10E3/UL (ref 3.4–10.8)

## 2020-03-18 DIAGNOSIS — E11.9 TYPE 2 DIABETES MELLITUS WITHOUT COMPLICATION, WITHOUT LONG-TERM CURRENT USE OF INSULIN (HCC): ICD-10-CM

## 2020-03-23 DIAGNOSIS — E11.9 TYPE 2 DIABETES MELLITUS WITHOUT COMPLICATION, WITHOUT LONG-TERM CURRENT USE OF INSULIN (HCC): ICD-10-CM

## 2020-03-23 DIAGNOSIS — F41.9 ANXIETY: ICD-10-CM

## 2020-03-23 RX ORDER — METFORMIN HYDROCHLORIDE 500 MG/1
2000 TABLET, EXTENDED RELEASE ORAL
Qty: 120 TABLET | Refills: 0 | Status: SHIPPED | OUTPATIENT
Start: 2020-03-23 | End: 2020-06-15 | Stop reason: SDUPTHER

## 2020-03-23 RX ORDER — BUPROPION HYDROCHLORIDE 300 MG/1
300 TABLET ORAL NIGHTLY
Qty: 30 TABLET | Refills: 0 | Status: SHIPPED | OUTPATIENT
Start: 2020-03-23 | End: 2020-06-15 | Stop reason: SDUPTHER

## 2020-06-15 ENCOUNTER — OFFICE VISIT (OUTPATIENT)
Dept: FAMILY MEDICINE CLINIC | Facility: CLINIC | Age: 65
End: 2020-06-15

## 2020-06-15 VITALS
RESPIRATION RATE: 16 BRPM | HEART RATE: 87 BPM | TEMPERATURE: 98.9 F | SYSTOLIC BLOOD PRESSURE: 100 MMHG | OXYGEN SATURATION: 96 % | DIASTOLIC BLOOD PRESSURE: 60 MMHG | BODY MASS INDEX: 21.59 KG/M2 | WEIGHT: 159.4 LBS | HEIGHT: 72 IN

## 2020-06-15 DIAGNOSIS — F41.9 ANXIETY: ICD-10-CM

## 2020-06-15 DIAGNOSIS — E78.00 PURE HYPERCHOLESTEROLEMIA: ICD-10-CM

## 2020-06-15 DIAGNOSIS — E11.9 TYPE 2 DIABETES MELLITUS WITHOUT COMPLICATION, WITHOUT LONG-TERM CURRENT USE OF INSULIN (HCC): ICD-10-CM

## 2020-06-15 PROCEDURE — 99214 OFFICE O/P EST MOD 30 MIN: CPT | Performed by: NURSE PRACTITIONER

## 2020-06-15 RX ORDER — ATORVASTATIN CALCIUM 40 MG/1
40 TABLET, FILM COATED ORAL NIGHTLY
Qty: 90 TABLET | Refills: 1 | Status: SHIPPED | OUTPATIENT
Start: 2020-06-15 | End: 2020-11-20

## 2020-06-15 RX ORDER — METFORMIN HYDROCHLORIDE 500 MG/1
2000 TABLET, EXTENDED RELEASE ORAL
Qty: 360 TABLET | Refills: 1 | Status: SHIPPED | OUTPATIENT
Start: 2020-06-15 | End: 2021-02-15 | Stop reason: SDUPTHER

## 2020-06-15 RX ORDER — BUPROPION HYDROCHLORIDE 300 MG/1
300 TABLET ORAL NIGHTLY
Qty: 90 TABLET | Refills: 1 | Status: SHIPPED | OUTPATIENT
Start: 2020-06-15 | End: 2020-11-20

## 2020-06-15 NOTE — PROGRESS NOTES
"Subjective   Dylan Mosher is a 64 y.o. male.     History of Present Illness    Since the last visit, he has overall felt well.  He has Essential Hypertension and well controlled on current medication, DMII well controlled on medication and will continue regimen and Hyperlipidemia with goals met with current Rx.  he has been compliant with current medications have reviewed them.  The patient denies medication side effects.  Will refill medications. /60   Pulse 87   Temp 98.9 °F (37.2 °C)   Resp 16   Ht 182.9 cm (72.01\")   Wt 72.3 kg (159 lb 6.4 oz)   SpO2 96%   BMI 21.61 kg/m²     Results for orders placed or performed in visit on 03/12/20   Comprehensive metabolic panel   Result Value Ref Range    Glucose 93 65 - 99 mg/dL    BUN 15 8 - 27 mg/dL    Creatinine 1.25 0.76 - 1.27 mg/dL    eGFR Non African Am 60 >59 mL/min/1.73    eGFR African Am 69 >59 mL/min/1.73    BUN/Creatinine Ratio 12 10 - 24    Sodium 144 134 - 144 mmol/L    Potassium 4.6 3.5 - 5.2 mmol/L    Chloride 103 96 - 106 mmol/L    Total CO2 26 20 - 29 mmol/L    Calcium 9.8 8.6 - 10.2 mg/dL    Total Protein 7.5 6.0 - 8.5 g/dL    Albumin 5.0 (H) 3.8 - 4.8 g/dL    Globulin 2.5 1.5 - 4.5 g/dL    A/G Ratio 2.0 1.2 - 2.2    Total Bilirubin 0.7 0.0 - 1.2 mg/dL    Alkaline Phosphatase 87 39 - 117 IU/L    AST (SGOT) 23 0 - 40 IU/L    ALT (SGPT) 32 0 - 44 IU/L   Lipid Panel With LDL/HDL Ratio   Result Value Ref Range    Total Cholesterol 137 100 - 199 mg/dL    Triglycerides 109 0 - 149 mg/dL    HDL Cholesterol 54 >39 mg/dL    VLDL Cholesterol 22 5 - 40 mg/dL    LDL Cholesterol  61 0 - 99 mg/dL    LDL/HDL Ratio 1.1 0.0 - 3.6 ratio   TSH   Result Value Ref Range    TSH 2.090 0.450 - 4.500 uIU/mL   Hemoglobin A1c   Result Value Ref Range    Hemoglobin A1C 6.0 (H) 4.8 - 5.6 %   MicroAlbumin, Urine, Random - Urine, Clean Catch   Result Value Ref Range    Microalbumin, Urine 3.8 Not Estab. ug/mL   CBC and Differential   Result Value Ref Range    WBC 5.6 " 3.4 - 10.8 x10E3/uL    RBC 5.07 4.14 - 5.80 x10E6/uL    Hemoglobin 15.0 13.0 - 17.7 g/dL    Hematocrit 46.2 37.5 - 51.0 %    MCV 91 79 - 97 fL    MCH 29.6 26.6 - 33.0 pg    MCHC 32.5 31.5 - 35.7 g/dL    RDW 15.9 (H) 11.6 - 15.4 %    Platelets 277 150 - 450 x10E3/uL    Neutrophil Rel % 58 Not Estab. %    Lymphocyte Rel % 25 Not Estab. %    Monocyte Rel % 12 Not Estab. %    Eosinophil Rel % 4 Not Estab. %    Basophil Rel % 1 Not Estab. %    Neutrophils Absolute 3.3 1.4 - 7.0 x10E3/uL    Lymphocytes Absolute 1.4 0.7 - 3.1 x10E3/uL    Monocytes Absolute 0.6 0.1 - 0.9 x10E3/uL    Eosinophils Absolute 0.2 0.0 - 0.4 x10E3/uL    Basophils Absolute 0.1 0.0 - 0.2 x10E3/uL    Immature Granulocyte Rel % 0 Not Estab. %    Immature Grans Absolute 0.0 0.0 - 0.1 x10E3/uL         The following portions of the patient's history were reviewed and updated as appropriate: allergies, current medications, past family history, past medical history, past social history, past surgical history and problem list.    Review of Systems   Constitutional: Negative for fatigue.   Respiratory: Negative for cough and shortness of breath.    Cardiovascular: Negative for chest pain and palpitations.   Endocrine: Negative for cold intolerance, heat intolerance, polydipsia, polyphagia and polyuria.   Skin: Negative for rash.   Psychiatric/Behavioral: Negative for dysphoric mood and sleep disturbance. The patient is not nervous/anxious.        Objective   Physical Exam   Constitutional: He is oriented to person, place, and time. He appears well-developed and well-nourished.   Neck: Carotid bruit is not present.   Cardiovascular: Normal rate and regular rhythm.   Pulmonary/Chest: Effort normal and breath sounds normal.   Neurological: He is alert and oriented to person, place, and time.   Psychiatric: He has a normal mood and affect. His behavior is normal. Judgment and thought content normal.   Nursing note and vitals reviewed.      Assessment/Plan   Dylan  was seen today for diabetes, hyperlipidemia and hypertension.    Diagnoses and all orders for this visit:    Pure hypercholesterolemia  -     atorvastatin (LIPITOR) 40 MG tablet; Take 1 tablet by mouth Every Night for 180 days.    Anxiety  -     buPROPion XL (WELLBUTRIN XL) 300 MG 24 hr tablet; Take 1 tablet by mouth Every Night for 180 days.    Type 2 diabetes mellitus without complication, without long-term current use of insulin (CMS/[facility])  -     metFORMIN ER (GLUCOPHAGE-XR) 500 MG 24 hr tablet; Take 4 tablets by mouth Daily With Dinner for 30 days.        Labs reviewed with pt today during visit. All questions answered.      Patient continues to lose weight.  His A1c is 6. Will d/c Farxiga and recheck A1c with next lab set.

## 2020-09-10 ENCOUNTER — OFFICE VISIT (OUTPATIENT)
Dept: FAMILY MEDICINE CLINIC | Facility: CLINIC | Age: 65
End: 2020-09-10

## 2020-09-10 VITALS
WEIGHT: 168.8 LBS | OXYGEN SATURATION: 97 % | RESPIRATION RATE: 16 BRPM | BODY MASS INDEX: 22.86 KG/M2 | DIASTOLIC BLOOD PRESSURE: 60 MMHG | TEMPERATURE: 97.3 F | HEIGHT: 72 IN | HEART RATE: 82 BPM | SYSTOLIC BLOOD PRESSURE: 110 MMHG

## 2020-09-10 DIAGNOSIS — M65.331 TRIGGER MIDDLE FINGER OF RIGHT HAND: ICD-10-CM

## 2020-09-10 DIAGNOSIS — M89.319 CLAVICULAR ENLARGEMENT: Primary | ICD-10-CM

## 2020-09-10 PROCEDURE — 99214 OFFICE O/P EST MOD 30 MIN: CPT | Performed by: NURSE PRACTITIONER

## 2020-09-10 PROCEDURE — 73000 X-RAY EXAM OF COLLAR BONE: CPT | Performed by: NURSE PRACTITIONER

## 2020-09-10 NOTE — PROGRESS NOTES
Subjective   Dylan Mosher is a 65 y.o. male.     History of Present Illness   Patient presents to office with CC of right clavicular enlargement.  He denies injury.  He noticed it on Tuesday.  Denies tenderness or pain.  Denies any other symptoms or issues.  Patient is a former smoker. No significant weight loss.     Patient also reports trigger finger to right 3rd phalanx for the past few months.  He had this before in left hand and saw Kleinert Kutz.  He would like referral back.   The following portions of the patient's history were reviewed and updated as appropriate: allergies, current medications, past family history, past medical history, past social history, past surgical history and problem list.    Review of Systems   Constitutional: Negative for fatigue and unexpected weight change.   Respiratory: Negative for cough and shortness of breath.    Cardiovascular: Negative for chest pain and palpitations.   Skin: Negative for rash.   Psychiatric/Behavioral: Negative for dysphoric mood and sleep disturbance. The patient is not nervous/anxious.        Objective   Physical Exam   Constitutional: He is oriented to person, place, and time. He appears well-developed and well-nourished.   Pulmonary/Chest: Effort normal. He exhibits mass and deformity.       Neurological: He is alert and oriented to person, place, and time.   Psychiatric: He has a normal mood and affect. His behavior is normal. Judgment and thought content normal.   Nursing note and vitals reviewed.  2 view xray of right clavicle ordered and reviewed by me.  Appears to be a mass at right sternoclavicular area but unsure if this is bony mass or soft tissue.  No images on file to review but not mentioned in radiology report from 10/2019.     Assessment/Plan   Dylan was seen today for knot on collar bone.    Diagnoses and all orders for this visit:    Clavicular enlargement  -     Cancel: XR sternoclavicular joints  -     XR clavicle right  -     US  Head Neck Soft Tissue    Trigger middle finger of right hand  -     Ambulatory Referral to Hand Surgery        Patient will schedule appt with hand surgery.

## 2020-09-11 ENCOUNTER — HOSPITAL ENCOUNTER (OUTPATIENT)
Dept: ULTRASOUND IMAGING | Facility: HOSPITAL | Age: 65
Discharge: HOME | End: 2020-09-11
Admitting: NURSE PRACTITIONER

## 2020-09-11 PROCEDURE — 76536 US EXAM OF HEAD AND NECK: CPT

## 2020-10-21 ENCOUNTER — OFFICE VISIT (OUTPATIENT)
Dept: FAMILY MEDICINE CLINIC | Facility: CLINIC | Age: 65
End: 2020-10-21

## 2020-10-21 VITALS
BODY MASS INDEX: 23.03 KG/M2 | OXYGEN SATURATION: 97 % | WEIGHT: 170 LBS | SYSTOLIC BLOOD PRESSURE: 130 MMHG | RESPIRATION RATE: 16 BRPM | TEMPERATURE: 97.7 F | HEIGHT: 72 IN | DIASTOLIC BLOOD PRESSURE: 81 MMHG | HEART RATE: 87 BPM

## 2020-10-21 DIAGNOSIS — M89.319 CLAVICULAR ENLARGEMENT: Primary | ICD-10-CM

## 2020-10-21 PROCEDURE — 99213 OFFICE O/P EST LOW 20 MIN: CPT | Performed by: FAMILY MEDICINE

## 2020-10-21 NOTE — PROGRESS NOTES
"   Subjective       Chief Complaint   Patient presents with   • Mass     right collar bone          SUBJECTIVE:       This patient presents for ongoing swelling of his right clavicle.  He had an injury of this area approximately 1 year ago.  He came in last month and had x-rays done.  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  History of Present Illness       Review of Systems   Musculoskeletal:        No bony pain.  No progression of swelling of right clavicle.      I have reviewed the ROS as documented above. Dylan Paulson MD     The patient's ACP documentation has  and needs to be reviewed again.        OBJECTIVE   Objective   /81   Pulse 87   Temp 97.7 °F (36.5 °C) (Tympanic)   Resp 16   Ht 182.9 cm (72\")   Wt 77.1 kg (170 lb)   SpO2 97%   BMI 23.06 kg/m²  Body mass index is 23.06 kg/m².    Physical Exam  Vitals signs reviewed.   Constitutional:       General: He is not in acute distress.  Musculoskeletal:        Arms:                  The data below has been reviewed by Dylan Paulson M.D. on date of this encounter.   XR clavicle right (2020 13:20)  US Head Neck Soft Tissue (2020 11:10)           Procedures           ASSESSMENT/PLAN:     Assessment/Plan   Diagnoses and all orders for this visit:    1. 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  Referral to orthopedist for second opinion  Orders:  -     Ambulatory Referral to Orthopedic Surgery          FOLLOW UP:    Return if symptoms worsen or fail to improve.     "

## 2020-10-28 NOTE — PROGRESS NOTES
New right Shoulder      Patient: Dylan Mosher        YOB: 1955    Medical Record Number: [id number]        Chief Complaints: right shoulder pain      History of Present Illness: This is a this is a 65-year-old right-hand-dominant male who presents complaining of some swelling about the right shoulder.  He states he noticed it a couple weeks ago he had not noticed it prior to that right the medial aspect of the shoulder near the sternoclavicular joint.  He states it does not hurt it does not limit him he just thought it was new and not normal.  Does not feel like it is getting bigger at this time his symptoms are mild if at all aching he thinks there is some local swelling nothing really makes it work worse or better he is a  past medical history is marked for diabetes depression anxiety stomach ulcers and sleep apnea      Allergies: No Known Allergies    Medications:   Home Medications:  Current Outpatient Medications on File Prior to Visit   Medication Sig   • atorvastatin (LIPITOR) 40 MG tablet Take 1 tablet by mouth Every Night for 180 days.   • buPROPion XL (WELLBUTRIN XL) 300 MG 24 hr tablet Take 1 tablet by mouth Every Night for 180 days.   • fexofenadine (ALLEGRA) 180 MG tablet Take 180 mg by mouth Daily.   • metFORMIN ER (GLUCOPHAGE-XR) 500 MG 24 hr tablet Take 4 tablets by mouth Daily With Dinner for 30 days.   • Multiple Vitamins-Minerals (multivitamin with minerals) tablet tablet Take 1 tablet by mouth Daily.   • omeprazole (priLOSEC) 20 MG capsule Take 20 mg by mouth Daily.   • aspirin 81 MG EC tablet Take 81 mg by mouth daily. Take 1 tablet by oral route once daily   • tadalafil (CIALIS) 20 MG tablet Take 1 tablet by mouth Daily As Needed for Erectile Dysfunction.     No current facility-administered medications on file prior to visit.      Current Medications:  Scheduled Meds:  Continuous Infusions:No current facility-administered medications for this visit.     PRN  Meds:.    Past Medical History:   Diagnosis Date   • Anorgasmia 2008   • Anxiety 2012   • Colitis 2019   • Colon polyp    • Controlled diabetes mellitus type II without complication (CMS/HCC) 2014   • CTS (carpal tunnel syndrome) 2009    bilateral   • CTS (carpal tunnel syndrome) 2009    right   • Diverticulitis of colon without hemorrhage    • Esophagitis     unspecified   • Fatigue 2008    daytime somnolence   • Grief reaction 2014    fiance of 6 years  of colon cancer   • History of stomach ulcers    • History of stress test 2012    normal   • LFT elevation 01/15/2014   • Pure hypercholesterolemia 2011   • Screening for prostate cancer 2008   • Sleep apnea 2008   • Tinnitus 2013   • Trapezius strain 2007    right with radiculopathy   • Type II diabetes mellitus, uncontrolled (CMS/HCC) 2011        Past Surgical History:   Procedure Laterality Date   • CHOLECYSTECTOMY  2013    Dr Newton   • ENDOSCOPY  08/15/2012    esophagitis   • EYE SURGERY  2018    left eye   • WRIST SURGERY Left     carpal tunnel        Social History     Occupational History   • Not on file   Tobacco Use   • Smoking status: Former Smoker     Packs/day: 1.50   • Smokeless tobacco: Never Used   Substance and Sexual Activity   • Alcohol use: Yes     Comment: current some day; 2/wk   • Drug use: No   • Sexual activity: Not Currently      Social History     Social History Narrative   • Not on file        Family History   Problem Relation Age of Onset   • Diabetes Mother         type II   • Diabetes Brother         Type II   • Kidney disease Brother         chornic, unspecified   • Heart disease Paternal Grandmother 80   • Stroke Paternal Grandmother 80                     Review of Systems: 14 point review of systems are remarkable for the pertinent positive listed in the chart by the patient the remainder negative    Review of  "Systems      Physical Exam: 65 y.o. male  General Appearance:    Alert, cooperative, in no acute distress                   Vitals:    10/30/20 0907   Temp: 98.6 °F (37 °C)   Weight: 78.1 kg (172 lb 1.6 oz)   Height: 182.9 cm (72\")   PainSc: 0-No pain      Patient is alert and read ×3 no acute distress appears her above-listed at height weight and age.  Affect is normal respiratory rate is normal unlabored. Heart rate regular rate rhythm, sclera, dentition and hearing are normal for the purpose of this exam.    Ortho Exam  Physical exam of the right shoulder reveals no overlying skin changes no lymphedema no lymphadenopathy.  The patient can actively flex to 180, abduction is similar external rotation is to 50, internal rotation to the upper lumbar spine.  Rotator cuff strength is 4+ to 5 over 5 with isometric strength testing without symptoms.  The patient has good cervical range of motion full and asymptomatic no radicular symptoms and a normal elbow exam.  Patient has good distal pulses he does have a prominence of the medial aspect of the clavicle at the sternoclavicular joint I feels like the clavicle sitting a bit anteriorly perhaps some osteophytes present no palpable tenderness no excessive mobilization he has good neck range of motion nothing that is inhibited by this area  Procedures          Radiology:   AP and sternoclavicular view of the right shoulder were ordered/reviewed to evauate shoulder pain.  I have no comparative films does appear to have some osteophytes at the medial edge of the clavicle at the sternoclavicular joint I do not appreciate a mass etc.  Imaging Results (Most Recent)     Procedure Component Value Units Date/Time    XR Clavicle Right [[id number]] Resulted: 10/30/20 0716     Updated: 10/30/20 0904    Impression:      Ordering physician's impression is located in the Encounter Note dated 10/30/20. X-ray performed in the DR room.          Assessment/Plan: Enlargement of right " sternoclavicular joint.  I told him this is probably more arthritic is not hurting him at this point.  I want him to watch it for the next month I will certainly make a mental note of how big it is I like to see him back in a month.  If at any point you feel it is getting bigger we will get a CT scan of this area

## 2020-10-30 ENCOUNTER — OFFICE VISIT (OUTPATIENT)
Dept: ORTHOPEDIC SURGERY | Facility: CLINIC | Age: 65
End: 2020-10-30

## 2020-10-30 VITALS — HEIGHT: 72 IN | TEMPERATURE: 98.6 F | WEIGHT: 172.1 LBS | BODY MASS INDEX: 23.31 KG/M2

## 2020-10-30 DIAGNOSIS — M89.8X1 PAIN OF RIGHT CLAVICLE: Primary | ICD-10-CM

## 2020-10-30 DIAGNOSIS — M19.011 ARTHRITIS OF RIGHT STERNOCLAVICULAR JOINT: ICD-10-CM

## 2020-10-30 DIAGNOSIS — M65.331 TRIGGER MIDDLE FINGER OF RIGHT HAND: ICD-10-CM

## 2020-10-30 PROCEDURE — 99203 OFFICE O/P NEW LOW 30 MIN: CPT | Performed by: ORTHOPAEDIC SURGERY

## 2020-10-30 PROCEDURE — 73000 X-RAY EXAM OF COLLAR BONE: CPT | Performed by: ORTHOPAEDIC SURGERY

## 2020-10-30 RX ORDER — MULTIPLE VITAMINS W/ MINERALS TAB 9MG-400MCG
1 TAB ORAL DAILY
COMMUNITY

## 2020-10-30 RX ORDER — OMEPRAZOLE 20 MG/1
20 CAPSULE, DELAYED RELEASE ORAL DAILY
COMMUNITY
End: 2022-07-11 | Stop reason: SDUPTHER

## 2020-10-30 RX ORDER — FEXOFENADINE HCL 180 MG/1
180 TABLET ORAL DAILY
COMMUNITY

## 2020-11-20 DIAGNOSIS — F41.9 ANXIETY: ICD-10-CM

## 2020-11-20 DIAGNOSIS — E78.00 PURE HYPERCHOLESTEROLEMIA: ICD-10-CM

## 2020-11-20 RX ORDER — BUPROPION HYDROCHLORIDE 300 MG/1
TABLET ORAL
Qty: 90 TABLET | Refills: 0 | Status: SHIPPED | OUTPATIENT
Start: 2020-11-20 | End: 2021-02-17 | Stop reason: SDUPTHER

## 2020-11-20 RX ORDER — ATORVASTATIN CALCIUM 40 MG/1
TABLET, FILM COATED ORAL
Qty: 90 TABLET | Refills: 0 | Status: SHIPPED | OUTPATIENT
Start: 2020-11-20 | End: 2021-02-17 | Stop reason: SDUPTHER

## 2020-12-09 DIAGNOSIS — E11.9 TYPE 2 DIABETES MELLITUS WITHOUT COMPLICATION, WITHOUT LONG-TERM CURRENT USE OF INSULIN (HCC): ICD-10-CM

## 2020-12-10 RX ORDER — METFORMIN HYDROCHLORIDE 500 MG/1
TABLET, EXTENDED RELEASE ORAL
Qty: 360 TABLET | Refills: 1 | OUTPATIENT
Start: 2020-12-10

## 2021-02-13 DIAGNOSIS — N52.9 ERECTILE DYSFUNCTION, UNSPECIFIED ERECTILE DYSFUNCTION TYPE: ICD-10-CM

## 2021-02-15 ENCOUNTER — TELEPHONE (OUTPATIENT)
Dept: FAMILY MEDICINE CLINIC | Facility: CLINIC | Age: 66
End: 2021-02-15

## 2021-02-15 DIAGNOSIS — E11.9 TYPE 2 DIABETES MELLITUS WITHOUT COMPLICATION, WITHOUT LONG-TERM CURRENT USE OF INSULIN (HCC): ICD-10-CM

## 2021-02-15 RX ORDER — TADALAFIL 20 MG/1
TABLET ORAL
Qty: 6 TABLET | Refills: 9 | OUTPATIENT
Start: 2021-02-15

## 2021-02-15 RX ORDER — METFORMIN HYDROCHLORIDE 500 MG/1
2000 TABLET, EXTENDED RELEASE ORAL
Qty: 120 TABLET | Refills: 0 | Status: SHIPPED | OUTPATIENT
Start: 2021-02-15 | End: 2021-02-17 | Stop reason: SDUPTHER

## 2021-02-15 NOTE — TELEPHONE ENCOUNTER
PATIENT IS COMPLETELY OUT OF HIS METFORMIN. HE HAS MADE AN APPOINTMENT FOR 02/17/21.    PLEASE ADVISE  [phone]     NEW PHARMACY  [facility]/pharmacy #[id number] - [address] - [phone]  - [phone] FX

## 2021-02-17 ENCOUNTER — OFFICE VISIT (OUTPATIENT)
Dept: FAMILY MEDICINE CLINIC | Facility: CLINIC | Age: 66
End: 2021-02-17

## 2021-02-17 VITALS
HEIGHT: 72 IN | RESPIRATION RATE: 16 BRPM | TEMPERATURE: 97.5 F | SYSTOLIC BLOOD PRESSURE: 121 MMHG | OXYGEN SATURATION: 98 % | WEIGHT: 165 LBS | DIASTOLIC BLOOD PRESSURE: 86 MMHG | HEART RATE: 80 BPM | BODY MASS INDEX: 22.35 KG/M2

## 2021-02-17 DIAGNOSIS — E78.00 PURE HYPERCHOLESTEROLEMIA: ICD-10-CM

## 2021-02-17 DIAGNOSIS — E11.9 TYPE 2 DIABETES MELLITUS WITHOUT COMPLICATION, WITHOUT LONG-TERM CURRENT USE OF INSULIN (HCC): ICD-10-CM

## 2021-02-17 DIAGNOSIS — R35.1 NOCTURIA: ICD-10-CM

## 2021-02-17 DIAGNOSIS — F41.9 ANXIETY: ICD-10-CM

## 2021-02-17 DIAGNOSIS — I70.0 AORTIC ATHEROSCLEROSIS (HCC): ICD-10-CM

## 2021-02-17 DIAGNOSIS — I25.10 CORONARY ARTERY DISEASE INVOLVING NATIVE CORONARY ARTERY OF NATIVE HEART WITHOUT ANGINA PECTORIS: ICD-10-CM

## 2021-02-17 DIAGNOSIS — E78.00 PURE HYPERCHOLESTEROLEMIA: Primary | ICD-10-CM

## 2021-02-17 DIAGNOSIS — N52.9 ERECTILE DYSFUNCTION, UNSPECIFIED ERECTILE DYSFUNCTION TYPE: ICD-10-CM

## 2021-02-17 DIAGNOSIS — E11.9 TYPE 2 DIABETES MELLITUS WITHOUT COMPLICATION, WITHOUT LONG-TERM CURRENT USE OF INSULIN (HCC): Primary | ICD-10-CM

## 2021-02-17 PROBLEM — E78.5 HLD (HYPERLIPIDEMIA): Status: RESOLVED | Noted: 2019-09-25 | Resolved: 2021-02-17

## 2021-02-17 PROBLEM — D62 ACUTE BLOOD LOSS ANEMIA: Status: RESOLVED | Noted: 2019-09-27 | Resolved: 2021-02-17

## 2021-02-17 PROCEDURE — 99214 OFFICE O/P EST MOD 30 MIN: CPT | Performed by: FAMILY MEDICINE

## 2021-02-17 RX ORDER — BUPROPION HYDROCHLORIDE 300 MG/1
300 TABLET ORAL NIGHTLY
Qty: 90 TABLET | Refills: 1 | Status: SHIPPED | OUTPATIENT
Start: 2021-02-17 | End: 2021-09-07

## 2021-02-17 RX ORDER — TADALAFIL 20 MG/1
20 TABLET ORAL DAILY PRN
Qty: 6 TABLET | Refills: 5 | Status: SHIPPED | OUTPATIENT
Start: 2021-02-17 | End: 2022-02-11

## 2021-02-17 RX ORDER — METFORMIN HYDROCHLORIDE 500 MG/1
2000 TABLET, EXTENDED RELEASE ORAL
Qty: 360 TABLET | Refills: 1 | Status: SHIPPED | OUTPATIENT
Start: 2021-02-17 | End: 2021-09-07

## 2021-02-17 RX ORDER — ATORVASTATIN CALCIUM 40 MG/1
40 TABLET, FILM COATED ORAL NIGHTLY
Qty: 90 TABLET | Refills: 1 | Status: SHIPPED | OUTPATIENT
Start: 2021-02-17 | End: 2021-10-07

## 2021-02-17 NOTE — PROGRESS NOTES
"Chief Complaint  Hyperlipidemia (6 month follow up ) and Diabetes    Subjective      History of Present Illness      Dylan Mosher presents to [facility] for medication refill.  Type 2 diabetes stable pending lab results.  Diabetic eye exam is due.  Cholesterol stable pending lab results.  Nocturia symptoms stable.  No chest pains reported.  Anxiety stable.  ED symptoms controlled with current medications.  Overall patient feeling well.    Review of Systems          Objective     Vital Signs:   /86   Pulse 80   Temp 97.5 °F (36.4 °C) (Skin)   Resp 16   Ht 182.9 cm (72\")   Wt 74.8 kg (165 lb)   SpO2 98%   BMI 22.38 kg/m²       Physical Exam  Vitals signs reviewed.   Constitutional:       General: He is not in acute distress.  Eyes:      General: Lids are normal.      Conjunctiva/sclera: Conjunctivae normal.   Neck:      Vascular: No carotid bruit.      Trachea: No tracheal deviation.   Cardiovascular:      Rate and Rhythm: Normal rate and regular rhythm.      Pulses:           Dorsalis pedis pulses are 2+ on the right side and 2+ on the left side.      Heart sounds: Normal heart sounds. No murmur.   Pulmonary:      Effort: Pulmonary effort is normal.      Breath sounds: Normal breath sounds.   Musculoskeletal:      Right foot: No deformity.      Left foot: No deformity.   Feet:      Right foot:      Protective Sensation: 3 sites tested. 3 sites sensed.      Skin integrity: Skin integrity normal.      Left foot:      Protective Sensation: 3 sites tested. 3 sites sensed.      Skin integrity: Skin integrity normal.      Comments: Normal monofilament test bilateral   Skin:     General: Skin is warm and dry.   Neurological:      Mental Status: He is alert. He is not disoriented.   Psychiatric:         Speech: Speech normal.         Behavior: Behavior normal. Behavior is cooperative.          Diabetic Foot Exam Performed and Monofilament Test Performed    Result Review : "                            Assessment/Plan     Assessment and Plan      Diagnoses and all orders for this visit:    1. Type 2 diabetes mellitus without complication, without long-term current use of insulin (CMS/[facility]) (Primary)  Assessment & Plan:  Diabetes is unchanged.   Continue current treatment regimen.  Diabetes will be reassessed in 6 months.    Orders:  -     metFORMIN ER (GLUCOPHAGE-XR) 500 MG 24 hr tablet; Take 4 tablets by mouth Daily With Dinner for 180 days.  Dispense: 360 tablet; Refill: 1  -     Ambulatory Referral for Diabetic Eye Exam-Ophthalmology  -     Comprehensive Metabolic Panel; Future  -     Hemoglobin A1c; Future  -     MicroAlbumin, Urine, Random - Urine, Clean Catch; Future    2. Pure hypercholesterolemia  Assessment & Plan:  Lipid abnormalities are unchanged.  Pharmacotherapy as ordered.  Lipids will be reassessed in 6 months.    Orders:  -     atorvastatin (LIPITOR) 40 MG tablet; Take 1 tablet by mouth Every Night for 180 days.  Dispense: 90 tablet; Refill: 1  -     Comprehensive Metabolic Panel; Future  -     CBC & Differential; Future  -     Lipid Panel With / Chol / HDL Ratio; Future  -     CK; Future    3. Anxiety  Assessment & Plan:  The current medical regimen is effective;  continue present plan and medications.        Orders:  -     buPROPion XL (WELLBUTRIN XL) 300 MG 24 hr tablet; Take 1 tablet by mouth Every Night for 180 days.  Dispense: 90 tablet; Refill: 1    4. Erectile dysfunction, unspecified erectile dysfunction type  Assessment & Plan:  The current medical regimen is effective;  continue present plan and medications.        Orders:  -     tadalafil (CIALIS) 20 MG tablet; Take 1 tablet by mouth Daily As Needed for Erectile Dysfunction.  Dispense: 6 tablet; Refill: 5    5. Nocturia  Assessment & Plan:  Nocturia symptoms are stable. PSA will be monitored with annual labs.          6. Coronary artery disease involving native coronary artery of native heart without angina  pectoris  Assessment & Plan:  Coronary artery disease is unchanged.  Continue current treatment regimen.  Cardiac status will be reassessed in 6 months.      7. Aortic atherosclerosis (CMS/HCC)  Assessment & Plan:  The current medical regimen is effective;  continue present plan and medications.    Goal LDL cholesterol less than 70.  Labs pending during today's office visit.            Follow Up   Return in about 6 months (around 8/17/2021) for recheck/refill medication.    Patient was given instructions and counseling regarding his condition or for health maintenance advice. Please see specific information pulled into the AVS if appropriate.

## 2021-02-17 NOTE — ASSESSMENT & PLAN NOTE
The current medical regimen is effective;  continue present plan and medications.    Goal LDL cholesterol less than 70.  Labs pending during today's office visit.

## 2021-02-18 LAB
ALBUMIN SERPL-MCNC: 4.8 G/DL (ref 3.5–5.2)
ALBUMIN/GLOB SERPL: 1.9 G/DL
ALP SERPL-CCNC: 90 U/L (ref 39–117)
ALT SERPL-CCNC: 38 U/L (ref 1–41)
AST SERPL-CCNC: 26 U/L (ref 1–40)
BASOPHILS # BLD AUTO: 0.08 10*3/MM3 (ref 0–0.2)
BASOPHILS NFR BLD AUTO: 1.1 % (ref 0–1.5)
BILIRUB SERPL-MCNC: 0.6 MG/DL (ref 0–1.2)
BUN SERPL-MCNC: 15 MG/DL (ref 8–23)
BUN/CREAT SERPL: 12.2 (ref 7–25)
CALCIUM SERPL-MCNC: 10 MG/DL (ref 8.6–10.5)
CHLORIDE SERPL-SCNC: 103 MMOL/L (ref 98–107)
CHOLEST SERPL-MCNC: 130 MG/DL (ref 0–200)
CK SERPL-CCNC: 90 U/L (ref 20–200)
CO2 SERPL-SCNC: 28.5 MMOL/L (ref 22–29)
CREAT SERPL-MCNC: 1.23 MG/DL (ref 0.76–1.27)
EOSINOPHIL # BLD AUTO: 0.18 10*3/MM3 (ref 0–0.4)
EOSINOPHIL NFR BLD AUTO: 2.5 % (ref 0.3–6.2)
ERYTHROCYTE [DISTWIDTH] IN BLOOD BY AUTOMATED COUNT: 12.5 % (ref 12.3–15.4)
GLOBULIN SER CALC-MCNC: 2.5 GM/DL
GLUCOSE SERPL-MCNC: 108 MG/DL (ref 65–99)
HBA1C MFR BLD: 6 % (ref 4.8–5.6)
HCT VFR BLD AUTO: 46 % (ref 37.5–51)
HDLC SERPL-MCNC: 55 MG/DL (ref 40–60)
HGB BLD-MCNC: 15.2 G/DL (ref 13–17.7)
IMM GRANULOCYTES # BLD AUTO: 0.02 10*3/MM3 (ref 0–0.05)
IMM GRANULOCYTES NFR BLD AUTO: 0.3 % (ref 0–0.5)
LDLC SERPL CALC-MCNC: 57 MG/DL (ref 0–100)
LDLC/HDLC SERPL: 1.03 {RATIO}
LYMPHOCYTES # BLD AUTO: 1.51 10*3/MM3 (ref 0.7–3.1)
LYMPHOCYTES NFR BLD AUTO: 20.9 % (ref 19.6–45.3)
MCH RBC QN AUTO: 29.7 PG (ref 26.6–33)
MCHC RBC AUTO-ENTMCNC: 33 G/DL (ref 31.5–35.7)
MCV RBC AUTO: 90 FL (ref 79–97)
MICROALBUMIN UR-MCNC: 11.4 UG/ML
MONOCYTES # BLD AUTO: 0.8 10*3/MM3 (ref 0.1–0.9)
MONOCYTES NFR BLD AUTO: 11.1 % (ref 5–12)
NEUTROPHILS # BLD AUTO: 4.63 10*3/MM3 (ref 1.7–7)
NEUTROPHILS NFR BLD AUTO: 64.1 % (ref 42.7–76)
NRBC BLD AUTO-RTO: 0 /100 WBC (ref 0–0.2)
PLATELET # BLD AUTO: 279 10*3/MM3 (ref 140–450)
POTASSIUM SERPL-SCNC: 4.9 MMOL/L (ref 3.5–5.2)
PROT SERPL-MCNC: 7.3 G/DL (ref 6–8.5)
PSA SERPL-MCNC: 0.55 NG/ML (ref 0–4)
RBC # BLD AUTO: 5.11 10*6/MM3 (ref 4.14–5.8)
SODIUM SERPL-SCNC: 141 MMOL/L (ref 136–145)
TRIGL SERPL-MCNC: 93 MG/DL (ref 0–150)
VLDLC SERPL CALC-MCNC: 18 MG/DL (ref 5–40)
WBC # BLD AUTO: 7.22 10*3/MM3 (ref 3.4–10.8)

## 2021-02-18 NOTE — PROGRESS NOTES
Please accept these satisfactory lab results. Please keep regular follow up appointment as scheduled.                                                    Dr. Paulson

## 2021-03-19 ENCOUNTER — BULK ORDERING (OUTPATIENT)
Dept: CASE MANAGEMENT | Facility: OTHER | Age: 66
End: 2021-03-19

## 2021-03-19 DIAGNOSIS — Z23 IMMUNIZATION DUE: ICD-10-CM

## 2021-06-10 DIAGNOSIS — F41.9 ANXIETY: ICD-10-CM

## 2021-06-10 DIAGNOSIS — E78.00 PURE HYPERCHOLESTEROLEMIA: ICD-10-CM

## 2021-06-11 RX ORDER — BUPROPION HYDROCHLORIDE 300 MG/1
TABLET ORAL
Qty: 90 TABLET | Refills: 1 | OUTPATIENT
Start: 2021-06-11

## 2021-06-11 RX ORDER — ATORVASTATIN CALCIUM 40 MG/1
TABLET, FILM COATED ORAL
Qty: 90 TABLET | Refills: 1 | OUTPATIENT
Start: 2021-06-11

## 2021-09-05 DIAGNOSIS — F41.9 ANXIETY: ICD-10-CM

## 2021-09-05 DIAGNOSIS — E11.9 TYPE 2 DIABETES MELLITUS WITHOUT COMPLICATION, WITHOUT LONG-TERM CURRENT USE OF INSULIN (HCC): ICD-10-CM

## 2021-09-07 RX ORDER — METFORMIN HYDROCHLORIDE 500 MG/1
TABLET, EXTENDED RELEASE ORAL
Qty: 120 TABLET | Refills: 0 | Status: SHIPPED | OUTPATIENT
Start: 2021-09-07 | End: 2021-10-11

## 2021-09-07 RX ORDER — BUPROPION HYDROCHLORIDE 300 MG/1
TABLET ORAL
Qty: 30 TABLET | Refills: 0 | Status: SHIPPED | OUTPATIENT
Start: 2021-09-07 | End: 2021-10-11

## 2021-10-06 DIAGNOSIS — E78.00 PURE HYPERCHOLESTEROLEMIA: ICD-10-CM

## 2021-10-07 RX ORDER — ATORVASTATIN CALCIUM 40 MG/1
TABLET, FILM COATED ORAL
Qty: 30 TABLET | Refills: 0 | Status: SHIPPED | OUTPATIENT
Start: 2021-10-07 | End: 2021-11-01

## 2021-10-10 DIAGNOSIS — E11.9 TYPE 2 DIABETES MELLITUS WITHOUT COMPLICATION, WITHOUT LONG-TERM CURRENT USE OF INSULIN (HCC): ICD-10-CM

## 2021-10-10 DIAGNOSIS — F41.9 ANXIETY: ICD-10-CM

## 2021-10-11 RX ORDER — METFORMIN HYDROCHLORIDE 500 MG/1
TABLET, EXTENDED RELEASE ORAL
Qty: 60 TABLET | Refills: 0 | Status: SHIPPED | OUTPATIENT
Start: 2021-10-11 | End: 2021-10-27

## 2021-10-11 RX ORDER — BUPROPION HYDROCHLORIDE 300 MG/1
TABLET ORAL
Qty: 15 TABLET | Refills: 0 | Status: SHIPPED | OUTPATIENT
Start: 2021-10-11 | End: 2021-11-08

## 2021-10-22 ENCOUNTER — TELEPHONE (OUTPATIENT)
Dept: FAMILY MEDICINE CLINIC | Facility: CLINIC | Age: 66
End: 2021-10-22

## 2021-10-27 DIAGNOSIS — E11.9 TYPE 2 DIABETES MELLITUS WITHOUT COMPLICATION, WITHOUT LONG-TERM CURRENT USE OF INSULIN (HCC): ICD-10-CM

## 2021-10-27 RX ORDER — METFORMIN HYDROCHLORIDE 500 MG/1
TABLET, EXTENDED RELEASE ORAL
Qty: 60 TABLET | Refills: 0 | Status: SHIPPED | OUTPATIENT
Start: 2021-10-27 | End: 2021-11-15 | Stop reason: SDUPTHER

## 2021-10-31 DIAGNOSIS — E78.00 PURE HYPERCHOLESTEROLEMIA: ICD-10-CM

## 2021-11-01 RX ORDER — ATORVASTATIN CALCIUM 40 MG/1
TABLET, FILM COATED ORAL
Qty: 30 TABLET | Refills: 0 | Status: SHIPPED | OUTPATIENT
Start: 2021-11-01 | End: 2021-11-15 | Stop reason: SDUPTHER

## 2021-11-08 DIAGNOSIS — F41.9 ANXIETY: ICD-10-CM

## 2021-11-08 RX ORDER — BUPROPION HYDROCHLORIDE 300 MG/1
TABLET ORAL
Qty: 15 TABLET | Refills: 0 | Status: SHIPPED | OUTPATIENT
Start: 2021-11-08 | End: 2021-11-15 | Stop reason: SDUPTHER

## 2021-11-10 ENCOUNTER — OFFICE VISIT (OUTPATIENT)
Dept: FAMILY MEDICINE CLINIC | Facility: CLINIC | Age: 66
End: 2021-11-10

## 2021-11-10 VITALS
RESPIRATION RATE: 16 BRPM | SYSTOLIC BLOOD PRESSURE: 122 MMHG | DIASTOLIC BLOOD PRESSURE: 80 MMHG | HEIGHT: 72 IN | OXYGEN SATURATION: 97 % | HEART RATE: 78 BPM | TEMPERATURE: 97.1 F | WEIGHT: 166 LBS | BODY MASS INDEX: 22.48 KG/M2

## 2021-11-10 DIAGNOSIS — M54.16 LUMBAR BACK PAIN WITH RADICULOPATHY AFFECTING LEFT LOWER EXTREMITY: Primary | ICD-10-CM

## 2021-11-10 PROCEDURE — 99213 OFFICE O/P EST LOW 20 MIN: CPT

## 2021-11-10 RX ORDER — CYCLOBENZAPRINE HCL 5 MG
5 TABLET ORAL 3 TIMES DAILY PRN
Qty: 30 TABLET | Refills: 0 | Status: SHIPPED | OUTPATIENT
Start: 2021-11-10 | End: 2021-11-15

## 2021-11-10 NOTE — PATIENT INSTRUCTIONS
Radicular Pain  Radicular pain is a type of pain that spreads from your back or neck along a spinal nerve. Spinal nerves are nerves that leave the spinal cord and go to the muscles. Radicular pain is sometimes called radiculopathy, radiculitis, or a pinched nerve. When you have this type of pain, you may also have weakness, numbness, or tingling in the area of your body that is supplied by the nerve. The pain may feel sharp and burning. Depending on which spinal nerve is affected, the pain may occur in the:  · Neck area (cervical radicular pain). You may also feel pain, numbness, weakness, or tingling in the arms.  · Mid-spine area (thoracic radicular pain). You would feel this pain in the back and chest. This type is rare.  · Lower back area (lumbar radicular pain). You would feel this pain as low back pain. You may feel pain, numbness, weakness, or tingling in the buttocks or legs. Sciatica is a type of lumbar radicular pain that shoots down the back of the leg.  Radicular pain occurs when one of the spinal nerves becomes irritated or squeezed (compressed). It is often caused by something pushing on a spinal nerve, such as one of the bones of the spine (vertebrae) or one of the round cushions between vertebrae (intervertebral disks). This can result from:  · An injury.  · Wear and tear or aging of a disk.  · The growth of a bone spur that pushes on the nerve.  Radicular pain often goes away when you follow instructions from your health care provider for relieving pain at home.  Follow these instructions at home:  Managing pain         · If directed, put ice on the affected area:  ? Put ice in a plastic bag.  ? Place a towel between your skin and the bag.  ? Leave the ice on for 20 minutes, 2-3 times a day.  · If directed, apply heat to the affected area as often as told by your health care provider. Use the heat source that your health care provider recommends, such as a moist heat pack or a heating pad.  ? Place  a towel between your skin and the heat source.  ? Leave the heat on for 20-30 minutes.  ? Remove the heat if your skin turns bright red. This is especially important if you are unable to feel pain, heat, or cold. You may have a greater risk of getting burned.  Activity    · Do not sit or rest in bed for long periods of time.  · Try to stay as active as possible. Ask your health care provider what type of exercise or activity is best for you.  · Avoid activities that make your pain worse, such as bending and lifting.  · Do not lift anything that is heavier than 10 lb (4.5 kg), or the limit that you are told, until your health care provider says that it is safe.  · Practice using proper technique when lifting items. Proper lifting technique involves bending your knees and rising up.  · Do strength and range-of-motion exercises only as told by your health care provider or physical therapist.    General instructions  · Take over-the-counter and prescription medicines only as told by your health care provider.  · Pay attention to any changes in your symptoms.  · Keep all follow-up visits as told by your health care provider. This is important.  ? Your health care provider may send you to a physical therapist to help with this pain.  Contact a health care provider if:  · Your pain and other symptoms get worse.  · Your pain medicine is not helping.  · Your pain has not improved after a few weeks of home care.  · You have a fever.  Get help right away if:  · You have severe pain, weakness, or numbness.  · You have difficulty with bladder or bowel control.  Summary  · Radicular pain is a type of pain that spreads from your back or neck along a spinal nerve.  · When you have radicular pain, you may also have weakness, numbness, or tingling in the area of your body that is supplied by the nerve.  · The pain may feel sharp or burning.  · Radicular pain may be treated with ice, heat, medicines, or physical therapy.  This  information is not intended to replace advice given to you by your health care provider. Make sure you discuss any questions you have with your health care provider.  Document Revised: 07/02/2019 Document Reviewed: 07/02/2019  [employer] Patient Education © 2021 [employer]

## 2021-11-10 NOTE — PROGRESS NOTES
Chief Complaint  Back Pain    Subjective          Dylan Mosher presents to [facility]  History of Present Illness    Dylan Mosher 66 y.o. male presents for evaluation and treatment of  low back pain. The patient first noted symptoms 3 days ago. It was not related to any recent heavy lifting, falls, and none recalled by the patient.  The pain intensity is intermittent and less intense, more mild now compared to when it started , and is located lumbar. The pain is described as throbbing and occurs intermittently, with movement, with walking and with rolling over in bed. The symptoms have improved some since onset. Symptoms are exacerbated by changing position, sitting and laying down. Factors which relieve the pain include NSAID's and CBD balm, and Lidocaine patch. Other associated symptoms include tingling in the left leg. Previous history of symptoms: the patient has had similar episodes of lumbar back pain in the past. Treatment efforts have included NSAID's , with relief.  No loss of bowel or bladder function, weakness in lower extremities, loss of sensation, fever, or impaired gait.          He has a problem list of the following:   Patient Active Problem List   Diagnosis   • ED (erectile dysfunction)   • Anxiety   • Colon polyp   • Pure hypercholesterolemia   • Sleep apnea   • History of diverticulosis with bleeding   • Type 2 diabetes mellitus without complication, without long-term current use of insulin (HCC)   • Nocturia   • Coronary artery disease involving native coronary artery of native heart without angina pectoris   • Aortic atherosclerosis (HCC)   .        Since the last visit, He has overall felt well.        He has been compliant with the following medications:   Current Outpatient Medications:   •  aspirin 81 MG EC tablet, Take 81 mg by mouth daily. Take 1 tablet by oral route once daily, Disp: , Rfl:   •  atorvastatin (LIPITOR) 40 MG tablet, TAKE 1 TABLET BY  "MOUTH EVERY DAY AT NIGHT, Disp: 30 tablet, Rfl: 0  •  buPROPion XL (WELLBUTRIN XL) 300 MG 24 hr tablet, TAKE 1 TABLET BY MOUTH EVERY DAY AT NIGHT, Disp: 15 tablet, Rfl: 0  •  fexofenadine (ALLEGRA) 180 MG tablet, Take 180 mg by mouth Daily., Disp: , Rfl:   •  metFORMIN ER (GLUCOPHAGE-XR) 500 MG 24 hr tablet, TAKE 4 TABLETS BY MOUTH DAILY WITH DINNER., Disp: 60 tablet, Rfl: 0  •  Multiple Vitamins-Minerals (multivitamin with minerals) tablet tablet, Take 1 tablet by mouth Daily., Disp: , Rfl:   •  omeprazole (priLOSEC) 20 MG capsule, Take 20 mg by mouth Daily., Disp: , Rfl:   •  tadalafil (CIALIS) 20 MG tablet, Take 1 tablet by mouth Daily As Needed for Erectile Dysfunction., Disp: 6 tablet, Rfl: 5  •  cyclobenzaprine (FLEXERIL) 5 MG tablet, Take 1 tablet by mouth 3 (Three) Times a Day As Needed for Muscle Spasms for up to 10 days. Avoid with driving. Do not use alcohol with this prescription, Disp: 30 tablet, Rfl: 0.        He  denies medication side effects.      All of the other chronic condition(s) listed above are stable w/o issues.    /80   Pulse 78   Temp 97.1 °F (36.2 °C)   Resp 16   Ht 182.9 cm (72\")   Wt 75.3 kg (166 lb)   SpO2 97%   BMI 22.51 kg/m²     Results for orders placed or performed in visit on 02/17/21   PSA DIAGNOSTIC    Specimen: Blood   Result Value Ref Range    PSA 0.547 0.000 - 4.000 ng/mL   MicroAlbumin, Urine, Random - Urine, Clean Catch    Specimen: Urine, Clean Catch   Result Value Ref Range    Microalbumin, Urine 11.4 Not Estab. ug/mL   Hemoglobin A1c    Specimen: Blood   Result Value Ref Range    Hemoglobin A1C 6.00 (H) 4.80 - 5.60 %   CK    Specimen: Blood   Result Value Ref Range    Creatine Kinase 90 20 - 200 U/L   Lipid Panel With LDL / HDL Ratio    Specimen: Blood   Result Value Ref Range    Total Cholesterol 130 0 - 200 mg/dL    Triglycerides 93 0 - 150 mg/dL    HDL Cholesterol 55 40 - 60 mg/dL    VLDL Cholesterol Valente 18 5 - 40 mg/dL    LDL Chol Calc (NIH) 57 0 - 100 " "mg/dL    LDL/HDL RATIO 1.03    Comprehensive Metabolic Panel    Specimen: Blood   Result Value Ref Range    Glucose 108 (H) 65 - 99 mg/dL    BUN 15 8 - 23 mg/dL    Creatinine 1.23 0.76 - 1.27 mg/dL    eGFR Non African Am 59 (L) >60 mL/min/1.73    eGFR African Am 71 >60 mL/min/1.73    BUN/Creatinine Ratio 12.2 7.0 - 25.0    Sodium 141 136 - 145 mmol/L    Potassium 4.9 3.5 - 5.2 mmol/L    Chloride 103 98 - 107 mmol/L    Total CO2 28.5 22.0 - 29.0 mmol/L    Calcium 10.0 8.6 - 10.5 mg/dL    Total Protein 7.3 6.0 - 8.5 g/dL    Albumin 4.80 3.50 - 5.20 g/dL    Globulin 2.5 gm/dL    A/G Ratio 1.9 g/dL    Total Bilirubin 0.6 0.0 - 1.2 mg/dL    Alkaline Phosphatase 90 39 - 117 U/L    AST (SGOT) 26 1 - 40 U/L    ALT (SGPT) 38 1 - 41 U/L   CBC & Differential    Specimen: Blood   Result Value Ref Range    WBC 7.22 3.40 - 10.80 10*3/mm3    RBC 5.11 4.14 - 5.80 10*6/mm3    Hemoglobin 15.2 13.0 - 17.7 g/dL    Hematocrit 46.0 37.5 - 51.0 %    MCV 90.0 79.0 - 97.0 fL    MCH 29.7 26.6 - 33.0 pg    MCHC 33.0 31.5 - 35.7 g/dL    RDW 12.5 12.3 - 15.4 %    Platelets 279 140 - 450 10*3/mm3    Neutrophil Rel % 64.1 42.7 - 76.0 %    Lymphocyte Rel % 20.9 19.6 - 45.3 %    Monocyte Rel % 11.1 5.0 - 12.0 %    Eosinophil Rel % 2.5 0.3 - 6.2 %    Basophil Rel % 1.1 0.0 - 1.5 %    Neutrophils Absolute 4.63 1.70 - 7.00 10*3/mm3    Lymphocytes Absolute 1.51 0.70 - 3.10 10*3/mm3    Monocytes Absolute 0.80 0.10 - 0.90 10*3/mm3    Eosinophils Absolute 0.18 0.00 - 0.40 10*3/mm3    Basophils Absolute 0.08 0.00 - 0.20 10*3/mm3    Immature Granulocyte Rel % 0.3 0.0 - 0.5 %    Immature Grans Absolute 0.02 0.00 - 0.05 10*3/mm3    nRBC 0.0 0.0 - 0.2 /100 WBC                  Objective     Vital Signs:   /80   Pulse 78   Temp 97.1 °F (36.2 °C)   Resp 16   Ht 182.9 cm (72\")   Wt 75.3 kg (166 lb)   SpO2 97%   BMI 22.51 kg/m²      Review of Systems   Constitutional: Negative for chills, fatigue and fever.   HENT: Negative for congestion, sinus " pressure and sore throat.    Eyes: Negative for blurred vision and visual disturbance.   Respiratory: Negative for cough and shortness of breath.    Cardiovascular: Negative for chest pain, palpitations and leg swelling.   Gastrointestinal: Negative for abdominal pain, diarrhea, nausea and vomiting.   Endocrine: Negative for cold intolerance, heat intolerance, polydipsia, polyphagia and polyuria.   Genitourinary: Negative for dysuria, frequency and urgency.   Musculoskeletal: Negative for back pain and myalgias.   Skin: Negative for color change and rash.   Neurological: Negative for syncope, light-headedness and headache.   Hematological: Does not bruise/bleed easily.   Psychiatric/Behavioral: Negative for dysphoric mood, sleep disturbance and stress.         Physical Exam  Vitals and nursing note reviewed.   Constitutional:       General: He is not in acute distress.     Appearance: He is well-developed.   HENT:      Head: Normocephalic and atraumatic.   Eyes:      General: No scleral icterus.        Right eye: No discharge.         Left eye: No discharge.      Conjunctiva/sclera: Conjunctivae normal.      Pupils: Pupils are equal, round, and reactive to light.   Neck:      Thyroid: No thyromegaly.      Vascular: No carotid bruit.      Trachea: No tracheal deviation.   Cardiovascular:      Rate and Rhythm: Normal rate and regular rhythm.      Pulses: Normal pulses.      Heart sounds: Normal heart sounds. No murmur heard.  No gallop.    Pulmonary:      Effort: Pulmonary effort is normal. No respiratory distress.      Breath sounds: Normal breath sounds. No wheezing or rales.   Musculoskeletal:      Cervical back: Normal range of motion and neck supple.      Lumbar back: Tenderness present. No swelling, edema, signs of trauma or bony tenderness. Normal range of motion. Positive left straight leg raise test. Negative right straight leg raise test.        Back:       Right lower leg: No edema.      Left lower leg: No  swelling. No edema.      Comments: Left-sided tenderness located at L4-L5. Positive left straight leg raise.     Skin:     General: Skin is warm.      Coloration: Skin is not pale.      Findings: No erythema or rash.   Neurological:      Mental Status: He is alert and oriented to person, place, and time.      Sensory: Sensation is intact. No sensory deficit.      Motor: Motor function is intact. No weakness or abnormal muscle tone.      Coordination: Coordination normal.      Gait: Gait is intact. Gait normal.      Comments: Intact sensation in bilateral lower extremities.  No weakness or impaired gait.   Psychiatric:         Behavior: Behavior normal.         Thought Content: Thought content normal.         Judgment: Judgment normal.          Result Review :                Assessment and Plan      Diagnoses and all orders for this visit:    1. Lumbar back pain with radiculopathy affecting left lower extremity (Primary)  -     cyclobenzaprine (FLEXERIL) 5 MG tablet; Take 1 tablet by mouth 3 (Three) Times a Day As Needed for Muscle Spasms for up to 10 days. Avoid with driving. Do not use alcohol with this prescription  Dispense: 30 tablet; Refill: 0            Follow Up     Return if symptoms worsen or fail to improve.    Patient was given instructions and counseling regarding his condition or for health maintenance advice. Please see specific information pulled into the AVS if appropriate.  Patient was given information in his after visit summary today regarding radicular pain.    -The patient's plan of care was reviewed with his PCP Dr. Dylan Paulson.  -Prescription for Cyclobenzaprine sent to the patient's pharmacy.  The patient was instructed to not drink alcohol and to not drive while taking this medication.  -Prescription for anti-inflammatory compound was signed by Dr. Dylan Paulson and sent to [facility] pharmacy  -Take all medications as prescribed  -Increase daily water intake  -Return to the office for  worsening signs or symptoms, fever, chills, pain going down your legs, or if symptoms do not improve after 2 weeks.  -Seek immediate medical attention for loss of bladder or bowel function, weakness or numbness in arms or legs, nausea, vomiting, abdominal pain, or syncope.  -The patient was instructed to return to the office for worsening signs or symptoms.  The patient has a scheduled appointment on 11/15/2021 with his PCP Dr. Dylan Paulson and will follow up at that time.  -The patient verbalized understanding of all instructions given today.

## 2021-11-15 ENCOUNTER — OFFICE VISIT (OUTPATIENT)
Dept: FAMILY MEDICINE CLINIC | Facility: CLINIC | Age: 66
End: 2021-11-15

## 2021-11-15 VITALS
SYSTOLIC BLOOD PRESSURE: 130 MMHG | HEIGHT: 72 IN | TEMPERATURE: 97.8 F | RESPIRATION RATE: 16 BRPM | BODY MASS INDEX: 22.48 KG/M2 | OXYGEN SATURATION: 98 % | WEIGHT: 166 LBS | HEART RATE: 86 BPM | DIASTOLIC BLOOD PRESSURE: 73 MMHG

## 2021-11-15 DIAGNOSIS — E11.9 TYPE 2 DIABETES MELLITUS WITHOUT COMPLICATION, WITHOUT LONG-TERM CURRENT USE OF INSULIN (HCC): ICD-10-CM

## 2021-11-15 DIAGNOSIS — E78.00 PURE HYPERCHOLESTEROLEMIA: ICD-10-CM

## 2021-11-15 DIAGNOSIS — R35.1 NOCTURIA: Primary | ICD-10-CM

## 2021-11-15 DIAGNOSIS — F41.9 ANXIETY: ICD-10-CM

## 2021-11-15 PROCEDURE — 99214 OFFICE O/P EST MOD 30 MIN: CPT | Performed by: FAMILY MEDICINE

## 2021-11-15 RX ORDER — METFORMIN HYDROCHLORIDE 500 MG/1
2000 TABLET, EXTENDED RELEASE ORAL
Qty: 360 TABLET | Refills: 1 | Status: SHIPPED | OUTPATIENT
Start: 2021-11-15 | End: 2022-07-11 | Stop reason: SDUPTHER

## 2021-11-15 RX ORDER — BUPROPION HYDROCHLORIDE 300 MG/1
300 TABLET ORAL
Qty: 90 TABLET | Refills: 1 | Status: SHIPPED | OUTPATIENT
Start: 2021-11-15 | End: 2022-06-15 | Stop reason: SDUPTHER

## 2021-11-15 RX ORDER — ATORVASTATIN CALCIUM 40 MG/1
40 TABLET, FILM COATED ORAL
Qty: 90 TABLET | Refills: 1 | Status: SHIPPED | OUTPATIENT
Start: 2021-11-15 | End: 2022-06-15 | Stop reason: SDUPTHER

## 2021-11-15 NOTE — ASSESSMENT & PLAN NOTE
The current medical regimen is effective;  continue present plan and medications.  LDL cholesterol goal <70. Lab results pending, drawn this morning

## 2021-11-15 NOTE — PROGRESS NOTES
"Chief Complaint  Diabetes    Subjective          Yaakov presents to [facility] to refill medicines.  Diabetes stable pending lab results.  Lipids stable pending lab results.  LDL goal less than 72 to history of aortic atherosclerosis.  Anxiety is well controlled with bupropion.    Since last visit patient has had small bowel obstruction and is doing much better now.  No surgery was required for this problem.  It was felt to be due to previous adhesions from abdominal surgery.          Objective   Vital Signs:   Vitals:    11/15/21 1317   BP: 130/73   Pulse: 86   Resp: 16   Temp: 97.8 °F (36.6 °C)   TempSrc: Skin   SpO2: 98%   Weight: 75.3 kg (166 lb)   Height: 182.9 cm (72\")        Physical Exam  Vitals reviewed.   Constitutional:       General: He is not in acute distress.  Eyes:      General: Lids are normal.      Conjunctiva/sclera: Conjunctivae normal.   Neck:      Vascular: No carotid bruit.      Trachea: No tracheal deviation.   Cardiovascular:      Rate and Rhythm: Normal rate and regular rhythm.      Heart sounds: Normal heart sounds. No murmur heard.      Pulmonary:      Effort: Pulmonary effort is normal.      Breath sounds: Normal breath sounds.   Skin:     General: Skin is warm and dry.   Neurological:      Mental Status: He is alert. He is not disoriented.   Psychiatric:         Speech: Speech normal.         Behavior: Behavior normal. Behavior is cooperative.          Result Review :                Assessment and Plan    Diagnoses and all orders for this visit:    1. Nocturia (Primary)  -     PSA DIAGNOSTIC; Future    2. Pure hypercholesterolemia  Assessment & Plan:  The current medical regimen is effective;  continue present plan and medications.      Orders:  -     atorvastatin (LIPITOR) 40 MG tablet; Take 1 tablet by mouth every night at bedtime.  Dispense: 90 tablet; Refill: 1  -     Comprehensive Metabolic Panel; Future  -     CBC & Differential; Future  -     Lipid " Panel With LDL / HDL Ratio; Future  -     CK; Future    3. Anxiety  Assessment & Plan:  Condition is stable. The current medical regimen is effective;  continue present plan and medications.    Orders:  -     buPROPion XL (WELLBUTRIN XL) 300 MG 24 hr tablet; Take 1 tablet by mouth every night at bedtime.  Dispense: 90 tablet; Refill: 1    4. Type 2 diabetes mellitus without complication, without long-term current use of insulin (HCC)  Assessment & Plan:  The current medical regimen is effective;  continue present plan and medications.      Orders:  -     metFORMIN ER (GLUCOPHAGE-XR) 500 MG 24 hr tablet; Take 4 tablets by mouth Daily With Dinner for 180 days.  Dispense: 360 tablet; Refill: 1  -     Comprehensive Metabolic Panel; Future  -     Hemoglobin A1c; Future  -     MicroAlbumin, Urine, Random - Urine, Clean Catch; Future      Follow Up   Return in about 6 months (around 5/15/2022) for Adult wellness & medication appt, schedule 30 min.  Patient was given instructions and counseling regarding his condition or for health maintenance advice. Please see specific information pulled into the AVS if appropriate.

## 2022-01-12 ENCOUNTER — OFFICE VISIT (OUTPATIENT)
Dept: FAMILY MEDICINE CLINIC | Facility: CLINIC | Age: 67
End: 2022-01-12

## 2022-01-12 VITALS
HEART RATE: 85 BPM | BODY MASS INDEX: 22.89 KG/M2 | SYSTOLIC BLOOD PRESSURE: 138 MMHG | HEIGHT: 72 IN | DIASTOLIC BLOOD PRESSURE: 72 MMHG | TEMPERATURE: 97.8 F | WEIGHT: 169 LBS | OXYGEN SATURATION: 98 % | RESPIRATION RATE: 16 BRPM

## 2022-01-12 DIAGNOSIS — G44.039 PAROXYSMAL HEMICRANIA: Primary | ICD-10-CM

## 2022-01-12 PROCEDURE — 99213 OFFICE O/P EST LOW 20 MIN: CPT | Performed by: NURSE PRACTITIONER

## 2022-01-12 NOTE — PATIENT INSTRUCTIONS
General Headache Without Cause  A headache is pain or discomfort felt around the head or neck area. The specific cause of a headache may not be found. There are many causes and types of headaches. A few common ones are:  · Tension headaches.  · Migraine headaches.  · Cluster headaches.  · Chronic daily headaches.  Follow these instructions at home:  Watch your condition for any changes. Let your health care provider know about them. Take these steps to help with your condition:  Managing pain         · Take over-the-counter and prescription medicines only as told by your health care provider.  · Lie down in a dark, quiet room when you have a headache.  · If directed, put ice on your head and neck area:  ? Put ice in a plastic bag.  ? Place a towel between your skin and the bag.  ? Leave the ice on for 20 minutes, 2-3 times per day.  · If directed, apply heat to the affected area. Use the heat source that your health care provider recommends, such as a moist heat pack or a heating pad.  ? Place a towel between your skin and the heat source.  ? Leave the heat on for 20-30 minutes.  ? Remove the heat if your skin turns bright red. This is especially important if you are unable to feel pain, heat, or cold. You may have a greater risk of getting burned.  · Keep lights dim if bright lights bother you or make your headaches worse.  Eating and drinking  · Eat meals on a regular schedule.  · If you drink alcohol:  ? Limit how much you use to:  § 0-1 drink a day for women.  § 0-2 drinks a day for men.  ? Be aware of how much alcohol is in your drink. In the [place] one drink equals one 12 oz bottle of beer (355 mL), one 5 oz glass of wine (148 mL), or one 1½ oz glass of hard liquor (44 mL).  · Stop drinking caffeine, or decrease the amount of caffeine you drink.  General instructions    · Keep a headache journal to help find out what may trigger your headaches. For example, write down:  ? What you eat and drink.  ? How much  sleep you get.  ? Any change to your diet or medicines.  · Try massage or other relaxation techniques.  · Limit stress.  · Sit up straight, and do not tense your muscles.  · Do not use any products that contain nicotine or tobacco, such as cigarettes, e-cigarettes, and chewing tobacco. If you need help quitting, ask your health care provider.  · Exercise regularly as told by your health care provider.  · Sleep on a regular schedule. Get 7-9 hours of sleep each night, or the amount recommended by your health care provider.  · Keep all follow-up visits as told by your health care provider. This is important.    Contact a health care provider if:  · Your symptoms are not helped by medicine.  · You have a headache that is different from the usual headache.  · You have nausea or you vomit.  · You have a fever.  Get help right away if:  · Your headache becomes severe quickly.  · Your headache gets worse after moderate to intense physical activity.  · You have repeated vomiting.  · You have a stiff neck.  · You have a loss of vision.  · You have problems with speech.  · You have pain in the eye or ear.  · You have muscular weakness or loss of muscle control.  · You lose your balance or have trouble walking.  · You feel faint or pass out.  · You have confusion.  · You have a seizure.  Summary  · A headache is pain or discomfort felt around the head or neck area.  · There are many causes and types of headaches. In some cases, the cause may not be found.  · Keep a headache journal to help find out what may trigger your headaches. Watch your condition for any changes. Let your health care provider know about them.  · Contact a health care provider if you have a headache that is different from the usual headache, or if your symptoms are not helped by medicine.  · Get help right away if your headache becomes severe, you vomit, you have a loss of vision, you lose your balance, or you have a seizure.  This information is not  intended to replace advice given to you by your health care provider. Make sure you discuss any questions you have with your health care provider.  Document Revised: 07/08/2019 Document Reviewed: 07/08/2019  [employer] Patient Education © 2021 [employer]

## 2022-01-12 NOTE — PROGRESS NOTES
Chief Complaint  Hospital Follow Up Visit    Deedee Nuno presents to [facility] PRIMARY CARE     66-year-old male presents to the office today for follow-up post ER visit on 12/29/2021    From ER [facility]    Review of Systems   Constitutional: Negative for chills, fatigue and fever.   HENT: Negative for congestion, sinus pressure and tinnitus.    Eyes: Negative for visual disturbance.   Respiratory: Negative for cough and shortness of breath.    Cardiovascular: Negative for chest pain, palpitations and leg swelling.   Gastrointestinal: Negative for abdominal pain, diarrhea, nausea and vomiting.   Neurological: Negative for dizziness, light-headedness and numbness.    66-year-old male who presents to the emergency room with severe left-sided headache that began yesterday. Headache has resolved in the emergency room. He has no complaints of vision loss, speech disturbance, weakness, and/or numbness of his upper or lower extremities. He had no focal deficits on neurologic exam. CT head without contrast was negative for hemorrhage. CTA of the head and neck wer e negative for large vessel occlusion, aneurysm, and/or dissection. Radiology dictation was concerning for possible atherosclerosis within the distal right vertebral artery versus fibromuscular dysplasia. Neurosurgery was contacted secondary to the radiology read of possible FMD versus atherosclerosis. There is no role for acute neurosurgical intervention. FMD and atherosclerosis are typically managed medically and patient is on asa therapy.     Upon discharge patient was referred to neurology-they have since called and stated they would not see him but they did not treat anything other than migraines  He is requesting a referral to another neurologist at this time for follow-up    He states that his headaches have been better since discharge but he still does have occasional severe headaches  He is taking over-the-counter  Motrin at this time-  Discussed with him rebound headaches with everyday use  States that he did have headaches prior to this ER visit but nothing this severe that concerned him.  Always relieved with OTC medication    He denies any sinus pain or pressure.  No nasal congestion no fevers no dizziness no nausea no vomiting no diarrhea no vision changes    Blood pressure in office today was 138/72 manual    He has a active problem list of the following  Patient Active Problem List   Diagnosis   • ED (erectile dysfunction)   • Anxiety   • Colon polyp   • Pure hypercholesterolemia   • Sleep apnea   • History of diverticulosis with bleeding   • Type 2 diabetes mellitus without complication, without long-term current use of insulin ([facility])   • Nocturia   • Coronary artery disease involving native coronary artery of native heart without angina pectoris   • Aortic atherosclerosis ([facility])       He has been compliant with the following medications    Current Outpatient Medications on File Prior to Visit   Medication Sig Dispense Refill   • aspirin 81 MG EC tablet Take 81 mg by mouth daily. Take 1 tablet by oral route once daily     • atorvastatin (LIPITOR) 40 MG tablet Take 1 tablet by mouth every night at bedtime. 90 tablet 1   • buPROPion XL (WELLBUTRIN XL) 300 MG 24 hr tablet Take 1 tablet by mouth every night at bedtime. 90 tablet 1   • fexofenadine (ALLEGRA) 180 MG tablet Take 180 mg by mouth Daily.     • metFORMIN ER (GLUCOPHAGE-XR) 500 MG 24 hr tablet Take 4 tablets by mouth Daily With Dinner for 180 days. 360 tablet 1   • Multiple Vitamins-Minerals (multivitamin with minerals) tablet tablet Take 1 tablet by mouth Daily.     • omeprazole (priLOSEC) 20 MG capsule Take 20 mg by mouth Daily.     • tadalafil (CIALIS) 20 MG tablet Take 1 tablet by mouth Daily As Needed for Erectile Dysfunction. 6 tablet 5     No current facility-administered medications on file prior to visit.         He denies any medication side effects    The  "following portions of the patient's history were reviewed and updated as appropriate: allergies, current medications, past family history, past medical history, past social history, past surgical history, and problem list       Objective   Vital Signs:   Vitals:    01/12/22 1445 01/12/22 1545   BP: 143/77 138/72   Pulse: 85    Resp: 16    Temp: 97.8 °F (36.6 °C)    TempSrc: Tympanic    SpO2: 98%    Weight: 76.7 kg (169 lb)    Height: 182.9 cm (72\")         Physical Exam  Vitals reviewed.   Constitutional:       Appearance: Normal appearance. He is not ill-appearing.   HENT:      Head: Normocephalic.      Nose: Nose normal. No congestion.      Mouth/Throat:      Mouth: Mucous membranes are moist.      Pharynx: Oropharynx is clear.   Eyes:      Extraocular Movements: Extraocular movements intact.      Conjunctiva/sclera: Conjunctivae normal.   Neck:      Vascular: No carotid bruit.   Cardiovascular:      Rate and Rhythm: Normal rate and regular rhythm.      Pulses: Normal pulses.      Heart sounds: Normal heart sounds. No murmur heard.      Pulmonary:      Effort: Pulmonary effort is normal.      Breath sounds: Normal breath sounds.   Abdominal:      General: Abdomen is flat.      Palpations: Abdomen is soft.   Musculoskeletal:      Cervical back: Normal range of motion and neck supple.   Skin:     General: Skin is warm.   Neurological:      General: No focal deficit present.      Mental Status: He is alert and oriented to person, place, and time.   Psychiatric:         Mood and Affect: Mood normal.         Behavior: Behavior normal.         Thought Content: Thought content normal.         Judgment: Judgment normal.          Result Review :     The following data was reviewed by: STACEY Cabello on 01/12/2022:              IProcedures  - from Last 3 Months    Procedure Name Priority Date/Time Associated Diagnosis Comments   CT ANGIOGRAM NECK STAT 12/29/2021 10:22 PM EST   Results for this procedure are in the " results section.     CT ANGIOGRAM HEAD STAT 12/29/2021 10:21 PM EST   Results for this procedure are in the results section.     CT HEAD WO CONTRAST STAT 12/29/2021 10:20 PM EST   Results for this procedure are in the results section.     HB SED RATE AUTOMATED STAT 12/29/2021 9:36 PM EST   Results for this procedure are in the results section.     HB C-REACTIVE PROTEIN STAT 12/29/2021 9:36 PM EST   Results for this procedure are in the results section.     HB COMP METABOLIC PANEL STAT 12/29/2021 9:36 PM EST   Results for this procedure are in the results section.     HB CBC/PLT/AUTO DIFF STAT 12/29/2021 9:36 PM EST   Results for this procedure are in the results section.         Table of Contents for Results   CT Angiogram Neck (12/29/2021 10:22 PM EST)   CT Angiogram Head (12/29/2021 10:21 PM EST)   CT Head Wo Contrast (12/29/2021 10:20 PM EST)   (ABNORMAL) Erythrocyte Sedimentation Rate (12/29/2021 9:36 PM EST)   (ABNORMAL) CBC w/Diff (12/29/2021 9:36 PM EST)   C-Reactive Protein (12/29/2021 9:36 PM EST)   Comprehensive Metabolic Panel (CMP) (12/29/2021 9:36 PM EST)   CT Abdomen & Pelvis W IV Contrast Without Oral Contrast (10/25/2021 10:08 PM EDT)   XR Abdomen Ap Only (10/25/2021 9:15 PM EDT)   (ABNORMAL) Glucose-Glucometer (10/18/2021 11:00 AM EDT)   XR Abdomen Flat And Upright (10/18/2021 5:45 AM EDT)   (ABNORMAL) Basic Metabolic Panel (BMP) (10/18/2021 2:46 AM EDT)   (ABNORMAL) Urinalysis (10/14/2021 7:18 PM EDT)   Lipase (10/14/2021 6:21 PM EDT)   EKG 12 lead (10/14/2021 4:03 PM EDT)   Nasal cannula oxygen (10/14/2021 4:02 PM EDT)        CT Angiogram Neck (12/29/2021 10:22 PM EST)  CT Angiogram Neck (12/29/2021 10:22 PM EST)   Anatomical Region Laterality Modality   Neck   Computed Tomography     CT Angiogram Neck (12/29/2021 10:22 PM EST)   Specimen         CT Angiogram Neck (12/29/2021 10:22 PM EST)   Narrative   [facility] - 12/30/2021 7:20 AM EST    REVIEWING YOUR TEST RESULTS IN [facility] IS NOT  A SUBSTITUTE FOR DISCUSSING THOSE RESULTS WITH YOUR HEALTH CARE PROVIDER.  PLEASE CONTACT YOUR PROVIDER VIA [employer] TO DISCUSS ANY QUESTIONS OR CONCERNS YOU MAY HAVE REGARDING THESE TEST RESULTS.    RADIOLOGY REPORT    FACILITY:  [facility]  UNIT/AGE/GENDER: N.ED  ER      AGE:66 Y          SEX:M  PATIENT NAME/:  SUHAS HUGO, , ANA MARÍA    1955  UNIT NUMBER:  [id number]  ACCOUNT NUMBER:  [id number]  ACCESSION NUMBER:  [id number]  [id number]      CTA OF THE HEAD WITH 3-D RECONSTRUCTIONS, CTA OF THE NECK    2021 at 2227 hours    HISTORY: Severe headache.         TECHNIQUE: Multiple axial images were obtained from the vertex to the thoracic inlet following the power administration of IV contrast. This is the CT angiogram protocol. Sagittal and coronal reformats were performed of the head and neck. Also, 3-D   reconstructions were performed. The radiation dose reduction technique was used to obtain ALARA for the exam.    COMPARISON: CT of the head performed on 2021    FINDINGS:    CTA OF THE HEAD: No aneurysm, branch vessel occlusion, or hemodynamically significant stenosis is demonstrated. Calcific atherosclerotic vascular disease noted of the cavernous carotids.    There is a patent anterior communicating artery. No posterior communicating arteries are demonstrated.    The basilar artery is intact. There is mild atherosclerotic narrowing of the vertebral arteries.    The dural sinuses are patent.    IMPRESSION:   1. Mild atherosclerotic narrowing of the vertebral arteries.  2. No hemodynamically significant stenosis, dissection or aneurysm      CTA OF THE NECK:    Right Common Carotid Artery: Mild atherosclerotic vascular disease noted of the distal right common carotid artery and proximal right internal internal carotid artery. There is mild narrowing. This is less than 50%.    Left Common Carotid Artery: Mild atherosclerotic vascular disease is noted of the proximal left  internal carotid artery. There is less than 50% stenosis.    Vertebral Arteries: The right vertebral artery is dominant. There is mild atherosclerotic narrowing of the distal vertebral arteries.    Aortic Arch: The takeoffs of the great vessels are unremarkable.    No mass or pathologically enlarged adenopathy is demonstrated. The mucosa is unremarkable. There are multilevel degenerative changes of the cervical spine. Large multilevel anterior osteophytes are demonstrated.    IMPRESSION:   1. No evidence of hemodynamically significant stenosis or dissection.  2. Large multilevel anterior osteophytes.  3. [employer] called a preliminary report.      NASCET criteria was used to evaluate stenosis. Severe stenosis is considered to be 70-80%.      Dictated by: Sukhdeep Perez M.D.    Images and Report reviewed and interpreted by: Sukhdeep Perez M.D.    <PS><Electronically signed by: Sukhdeep Perez M.D.>  2021 0718    D: 2021 0710  T: 202110       CT Angiogram Neck (2021 10:22 PM EST)   Procedure Note   Sukhdeep Perez MD - 2021    Formatting of this note might be different from the original.  REVIEWING YOUR TEST RESULTS IN [facility] IS NOT A SUBSTITUTE FOR DISCUSSING THOSE RESULTS WITH YOUR HEALTH CARE PROVIDER.  PLEASE CONTACT YOUR PROVIDER VIA [employer] TO DISCUSS ANY QUESTIONS OR CONCERNS YOU MAY HAVE REGARDING THESE TEST RESULTS.    RADIOLOGY REPORT    FACILITY: [facility]  UNIT/AGE/GENDER: N.ED ER AGE:66 Y SEX:M  PATIENT NAME/: SUHAS HUGO, , III 1955  UNIT NUMBER: [id number]  ACCOUNT NUMBER: [id number]  ACCESSION NUMBER: [id number]  [id number]      CTA OF THE HEAD WITH 3-D RECONSTRUCTIONS, CTA OF THE NECK    2021 at 2227 hours    HISTORY: Severe headache.     TECHNIQUE: Multiple axial images were obtained from the vertex to the thoracic inlet following the power administration of IV contrast. This is the CT angiogram protocol.  Sagittal and coronal reformats were performed of the head and neck. Also, 3-D   reconstructions were performed. The radiation dose reduction technique was used to obtain ALARA for the exam.    COMPARISON: CT of the head performed on December 29, 2021    FINDINGS:    CTA OF THE HEAD: No aneurysm, branch vessel occlusion, or hemodynamically significant stenosis is demonstrated. Calcific atherosclerotic vascular disease noted of the cavernous carotids.    There is a patent anterior communicating artery. No posterior communicating arteries are demonstrated.    The basilar artery is intact. There is mild atherosclerotic narrowing of the vertebral arteries.    The dural sinuses are patent.    IMPRESSION:   1. Mild atherosclerotic narrowing of the vertebral arteries.  2. No hemodynamically significant stenosis, dissection or aneurysm      CTA OF THE NECK:    Right Common Carotid Artery: Mild atherosclerotic vascular disease noted of the distal right common carotid artery and proximal right internal internal carotid artery. There is mild narrowing. This is less than 50%.    Left Common Carotid Artery: Mild atherosclerotic vascular disease is noted of the proximal left internal carotid artery. There is less than 50% stenosis.    Vertebral Arteries: The right vertebral artery is dominant. There is mild atherosclerotic narrowing of the distal vertebral arteries.    Aortic Arch: The takeoffs of the great vessels are unremarkable.    No mass or pathologically enlarged adenopathy is demonstrated. The mucosa is unremarkable. There are multilevel degenerative changes of the cervical spine. Large multilevel anterior osteophytes are demonstrated.    IMPRESSION:   1. No evidence of hemodynamically significant stenosis or dissection.  2. Large multilevel anterior osteophytes.  3. [facility] called a preliminary report.      NASCET criteria was used to evaluate stenosis. Severe stenosis is considered to be 70-80%.      Dictated by: Sukhdeep  EBER Perez    Images and Report reviewed and interpreted by: Sukhdeep Perez M.D.    <PS><Electronically signed by: Sukhdeep Perez M.D.>  [id number]    D: 2021 0710  T: 2021 0710       CT Angiogram Neck (2021 10:22 PM EST)   Performing Organization Address City/State/ZIP Code Phone Number   [facility]              Back to top of Results       CT Angiogram Head (2021 10:21 PM EST)  CT Angiogram Head (2021 10:21 PM EST)   Anatomical Region Laterality Modality   Head   Computed Tomography     CT Angiogram Head (2021 10:21 PM EST)   Specimen         CT Angiogram Head (2021 10:21 PM EST)   Narrative   [facility] - 2021 7:20 AM EST    RE[facility]ING YOUR TEST RESULTS IN [facility] IS NOT A SUBSTITUTE FOR DISCUSSING THOSE RESULTS WITH YOUR HEALTH CARE PROVIDER.  PLEASE CONTACT YOUR PROVIDER [facility]IA [facility] TO DISCUSS ANY QUESTIONS OR CONCERNS YOU MAY HAVE REGARDING THESE TEST RESULTS.    RADIOLOGY REPORT    [facility]  UNIT/AGE/GENDER: N.ED  ER      AGE:66 Y          SEX:M  PATIENT NAME/:  SUHAS HUGO, , ANA MARÍA    1955  UNIT NUMBER:  [id number]  ACCOUNT NUMBER:  [id number]  ACCESSION NUMBER:  [id number]  [id number]      CTA OF THE HEAD WITH 3-D RECONSTRUCTIONS, CTA OF THE NECK    2021 at 2227 hours    HISTORY: Severe headache.         TECHNIQUE: Multiple axial images were obtained from the vertex to the thoracic inlet following the power administration of IV contrast. This is the CT angiogram protocol. Sagittal and coronal reformats were performed of the head and neck. Also, 3-D   reconstructions were performed. The radiation dose reduction technique was used to obtain ALARA for the exam.    COMPARISON: CT of the head performed on 2021    FINDINGS:    CTA OF THE HEAD: No aneurysm, branch vessel occlusion, or hemodynamically significant stenosis is demonstrated. Calcific atherosclerotic vascular disease  noted of the cavernous carotids.    There is a patent anterior communicating artery. No posterior communicating arteries are demonstrated.    The basilar artery is intact. There is mild atherosclerotic narrowing of the vertebral arteries.    The dural sinuses are patent.    IMPRESSION:   1. Mild atherosclerotic narrowing of the vertebral arteries.  2. No hemodynamically significant stenosis, dissection or aneurysm      CTA OF THE NECK:    Right Common Carotid Artery: Mild atherosclerotic vascular disease noted of the distal right common carotid artery and proximal right internal internal carotid artery. There is mild narrowing. This is less than 50%.    Left Common Carotid Artery: Mild atherosclerotic vascular disease is noted of the proximal left internal carotid artery. There is less than 50% stenosis.    Vertebral Arteries: The right vertebral artery is dominant. There is mild atherosclerotic narrowing of the distal vertebral arteries.    Aortic Arch: The takeoffs of the great vessels are unremarkable.    No mass or pathologically enlarged adenopathy is demonstrated. The mucosa is unremarkable. There are multilevel degenerative changes of the cervical spine. Large multilevel anterior osteophytes are demonstrated.    IMPRESSION:   1. No evidence of hemodynamically significant stenosis or dissection.  2. Large multilevel anterior osteophytes.  3. [facility] called a preliminary report.      NASCET criteria was used to evaluate stenosis. Severe stenosis is considered to be 70-80%.      Dictated by: Sukhdeep Perez M.D.    Images and Report reviewed and interpreted by: Sukhdeep Perez M.D.    <PS><Electronically signed by: Sukhdeep Perez M.D.>  12/30/2021 0718    D: 12/30/2021 0710  T: 12/30/2021 0710       CT Angiogram Head (12/29/2021 10:21 PM EST)   Procedure Note   Sukhdeep Perez MD - 12/30/2021    Formatting of this note might be different from the original.  REVIEWING YOUR TEST RESULTS IN  [facility] IS NOT A SUBSTITUTE FOR DISCUSSING THOSE RESULTS WITH YOUR HEALTH CARE PROVIDER.  PLEASE CONTACT YOUR PROVIDER VIA [employer] TO DISCUSS ANY QUESTIONS OR CONCERNS YOU MAY HAVE REGARDING THESE TEST RESULTS.    RADIOLOGY REPORT    FACILITY: [facility]  UNIT/AGE/GENDER: N.ED ER AGE:66 Y SEX:M  PATIENT NAME/: SUHAS HUGO, , III 1955  UNIT NUMBER: [id number]  ACCOUNT NUMBER: [id number]  ACCESSION NUMBER: [id number]  [id number]      CTA OF THE HEAD WITH 3-D RECONSTRUCTIONS, CTA OF THE NECK    2021 at 2227 hours    HISTORY: Severe headache.     TECHNIQUE: Multiple axial images were obtained from the vertex to the thoracic inlet following the power administration of IV contrast. This is the CT angiogram protocol. Sagittal and coronal reformats were performed of the head and neck. Also, 3-D   reconstructions were performed. The radiation dose reduction technique was used to obtain ALARA for the exam.    COMPARISON: CT of the head performed on 2021    FINDINGS:    CTA OF THE HEAD: No aneurysm, branch vessel occlusion, or hemodynamically significant stenosis is demonstrated. Calcific atherosclerotic vascular disease noted of the cavernous carotids.    There is a patent anterior communicating artery. No posterior communicating arteries are demonstrated.    The basilar artery is intact. There is mild atherosclerotic narrowing of the vertebral arteries.    The dural sinuses are patent.    IMPRESSION:   1. Mild atherosclerotic narrowing of the vertebral arteries.  2. No hemodynamically significant stenosis, dissection or aneurysm      CTA OF THE NECK:    Right Common Carotid Artery: Mild atherosclerotic vascular disease noted of the distal right common carotid artery and proximal right internal internal carotid artery. There is mild narrowing. This is less than 50%.    Left Common Carotid Artery: Mild atherosclerotic vascular disease is noted of the proximal left  internal carotid artery. There is less than 50% stenosis.    Vertebral Arteries: The right vertebral artery is dominant. There is mild atherosclerotic narrowing of the distal vertebral arteries.    Aortic Arch: The takeoffs of the great vessels are unremarkable.    No mass or pathologically enlarged adenopathy is demonstrated. The mucosa is unremarkable. There are multilevel degenerative changes of the cervical spine. Large multilevel anterior osteophytes are demonstrated.    IMPRESSION:   1. No evidence of hemodynamically significant stenosis or dissection.  2. Large multilevel anterior osteophytes.  3. [facility] called a preliminary report.      NASCET criteria was used to evaluate stenosis. Severe stenosis is considered to be 70-80%.      Dictated by: Sukhdeep Perez M.D.    Images and Report reviewed and interpreted by: Sukhdeep Perez M.D.    <PS><Electronically signed by: Sukhdeep Perez M.D.>  2021 0718    D: 2021 0710  T: 202110       CT Angiogram Head (2021 10:21 PM EST)   Performing Organization Address City/State/ZIP Code Phone Number   [facility]              Back to top of Results       CT Head Wo Contrast (2021 10:20 PM EST)  CT Head Wo Contrast (2021 10:20 PM EST)   Anatomical Region Laterality Modality   Head   Computed Tomography     CT Head Wo Contrast (2021 10:20 PM EST)   Specimen                    CT Head Wo Contrast (2021 10:20 PM EST)   Procedure Note   Tasneem Frias MD - 2021    Formatting of this note might be different from the original.  REVIEWING YOUR TEST RESULTS IN [facility] IS NOT A SUBSTITUTE FOR DISCUSSING THOSE RESULTS WITH YOUR HEALTH CARE PROVIDER.  PLEASE CONTACT YOUR PROVIDER [facility]IA [facility] TO DISCUSS ANY QUESTIONS OR CONCERNS YOU MAY HAVE REGARDING THESE TEST RESULTS.    RADIOLOGY REPORT    FACILITY: [facility]  UNIT/AGE/GENDER: N.ED ER AGE:66 Y SEX:M  PATIENT NAME/: SUHAS HUGO, , ANA MARÍA  1955  UNIT NUMBER: [id number]  ACCOUNT NUMBER: [id number]  ACCESSION NUMBER: [id number]    CT of the head without contrast dated December 29, 2021.    INDICATION: Complex patient. Severe thunderclap headache.    TECHNIQUE: Routine noncontrasted CT of the head was performed. Dose reduction techniques were employed per ALARA protocol.    COMPARISON: None    FINDINGS: Patient is slightly tilted in the gantry and there is mild motion artifact.    There is ethmoid sinus disease and right maxillary sinus disease. Remainder the visualized paranasal sinuses and mastoid air cells are well aerated. No acute osseous abnormality.    Mild prominence of CSF spaces and sulci. Atherosclerotic vascular calcification. No intracranial hemorrhage, midline shift or hydrocephalus. No asymmetric hyperdense vessel. No acute extra-axial collection. Increased density at the interhemispheric   fissure is calcification.    Previous lens surgery bilaterally. Partially empty sella. No acute soft tissue abnormality.    IMPRESSION:  1. No acute intracranial abnormality.    Dictated by: Tasneem Frias M.D.    Images and Report reviewed and interpreted by: Tasneem Frias M.D.    <PS><Electronically signed by: Tasneem Frisa M.D.>  12/29/2021 2233    D: 12/29/2021 2230  T: 12/29/2021 2230                        Back to top of Results       (ABNORMAL) Erythrocyte Sedimentation Rate (12/29/2021 9:36 PM EST)  (ABNORMAL) Erythrocyte Sedimentation Rate (12/29/2021 9:36 PM EST)   Component Value Ref Range Performed At Pathologist Signature   Erythrocyte Sedimentation Rate 25 (H) 0 - 15 mm/Hr [facility]         Back to top of Results       (ABNORMAL) CBC w/Diff (12/29/2021 9:36 PM EST)  Only the most recent of 7 results within the time period is included.    (ABNORMAL) CBC w/Diff (12/29/2021 9:36 PM EST)   Component Value Ref Range Performed At Pathologist Signature   White Blood Count 8.95 4.5 - 11.0 10*3/uL [facility]     Red Blood  Count 4.42 (L) 4.5 - 5.9 10*6/uL [facility]     Hemoglobin 13.1 (L) 13.5 - 17.5 g/dL [facility]     Hematocrit 40.8 (L) 41.0 - 53.0 % [facility]     Mean Corpuscular Volume 92.3 80.0 - 100.0 fL [facility]     Mean Corpuscular Hemoglobin 29.6 26.0 - 34.0 pg [facility]     Mean Corpuscular HGB Conc 32.1 31.0 - 37.0 g/dL [facility]     Red Cell Distribution Width-CV 13.1 12.0 - 16.8 % [facility]     Platelet Count 232 140 - 440 10*3/uL [facility] [facility]     Mean Platelet Volume 9.4 8.4 - 12.4 fL [facility]     Diff Type Hospital CBC w/AutoDiff (arb'U) [facility]     Neutrophils % 65.0 45 - 80 % [facility]     Lymphocyte % 19.1 15 - 50 % [facility]     Monocyte % 10.6 0 - 15 % [facility]     Eosinophil% 4.1 0 - 7 % [facility]     BASO% 1.0 0 - 2 % [facility]     Immature Granulocyte% 0.2 0.0 - 1.0 % [facility]     Nucleated RBC % 0 0 /100(WBC) [facility]     Neutrophil Abs 5.81 2.0 - 8.8 10*3/uL [facility]     Lymphocyte-Absolute 1.71 0.7 - 5.5 10*3/uL [facility]     Monocyte Absolute 0.95 0.0 - 1.7 10*3/uL [facility]     EOS-Absolute 0.37 0.0 - 0.8 10*3/uL [facility]     Basophil Abs 0.09 0.0 - 0.2 10*3/uL [facility]     Immature Granulocyte Abs 0.02 0.00 - 0.10 10*3/uL [facility]       (ABNORMAL) CBC w/Diff (12/29/2021 9:36 PM EST)   Specimen   Whole Blood - Blood     (ABNORMAL) CBC w/Diff (12/29/2021 9:36 PM EST)   Performing Organization Address City/State/ZIP Code Phone Number   [facility] ([id number])   [address] [place] [address] [phone]     [facility]   [address] [place] [address]            Back to top of Results       C-Reactive Protein (12/29/2021 9:36 PM EST)  C-Reactive Protein (12/29/2021 9:36 PM EST)   Component Value Ref Range Performed At Pathologist Signature   C-Reactive Protein 0.3 <0.5 mg/dL [facility]       C-Reactive Protein (12/29/2021 9:36 PM  EST)   Specimen   Plasma - Blood     C-Reactive Protein (12/29/2021 9:36 PM EST)   Performing Organization Address City/State/ZIP Code Phone Number   [facility] ([id number])   [address] [place] [address] [phone]     [facility]   [address] [place] [address]            Back to top of Results       Comprehensive Metabolic Panel (CMP) (12/29/2021 9:36 PM EST)  Only the most recent of 3 results within the time period is included.    Comprehensive Metabolic Panel (CMP) (12/29/2021 9:36 PM EST)   Component Value Ref Range Performed At Pathologist Signature   Sodium 141  Comment:   (note)  Excess protein and/or lipids can falsely decrease sodium levels  (pseudo hyponatremia).   136 - 145 mmol/L [facility]     Potassium 3.9 3.5 - 5.1 mmol/L [facility]     Chloride 107 98 - 107 mmol/L [facility]     Carbon Dioxide 22 22 - 29 mmol/L [facility]     Anion Gap 12  Comment:   (note)  Calculation- Na - (Cl + CO2)   5 - 13 (arb'U) [facility]     Glucose 91 74 - 99 mg/dL [facility]     Blood Urea Nitrogen (BUN) 13 8 - 26 mg/dL [facility]     Creatinine-Blood 0.99 0.73 - 1.18 mg/dL [facility]     BUN/Creatinine Ratio 13.1 RATIO [facility]     Estimated GFR >60  Comment:   (note)  Results do not take into account body mass.  Valid for patients 18  to 70 years of age.   >60 /1.73 m2 [facility]     Estimated GFR if -American >60  Comment:   (note)  Results do not take into account body mass.  Valid for patients 18  to 70 years of age.   >60 /1.73 m2 [facility]     Total Protein 7.7 6.2 - 8.0 g/dL [facility]     Albumin 4.4 3.2 - 4.6 g/dL [facility]     Globulin 3.3 1.5 - 4.5 g/dL [facility]     Albumin/Globulin Ratio 1.3 1.1 - 2.5 RATIO [facility]     Calcium 9.4 8.4 - 10.2 mg/dL [facility]     Total Bilirubin 0.4 0.2 - 1.2 mg/dL [facility]     AST/SGOT 21 5 - 34 U/L [facility]     ALT/SGPT 24 0 - 55 U/L  [facility]     Alkaline Phosphatase 103 40 - 150 U/L [facility]       Comprehensive Metabolic Panel (CMP) (12/29/2021 9:36 PM EST)   Specimen   Plasma - Blood     Comprehensive Metabolic Panel (CMP) (12/29/2021 9:36 PM EST)   Performing Organization Address City/State/ZIP Code Phone Number   [facility] ([id number])   [address] [place] [address] [phone]     [facility]   [address] [place] [address]              Back to top of Results           A     Show All Sections              Assessment and Plan    Diagnoses and all orders for this visit:    1. Paroxysmal hemicrania (Primary)  -     Ambulatory Referral to Neurology      Plan  Referral made to Neurology for further assessment/followup with continued HA intermittent   Thunderclap on 12/29-  Resolved  Repeat labs  Elevated Sed rate with next follow up  TO ER with any acute severe HA, or dizziness      Follow Up   Return in about 1 month (around 2/12/2022), or if symptoms worsen or fail to improve.  Patient was given instructions and counseling regarding his condition or for health maintenance advice. Please see specific information pulled into the AVS if appropriate.

## 2022-02-11 DIAGNOSIS — N52.9 ERECTILE DYSFUNCTION, UNSPECIFIED ERECTILE DYSFUNCTION TYPE: ICD-10-CM

## 2022-02-14 RX ORDER — TADALAFIL 20 MG/1
20 TABLET ORAL DAILY PRN
Qty: 6 TABLET | Refills: 0 | Status: SHIPPED | OUTPATIENT
Start: 2022-02-14 | End: 2022-05-12 | Stop reason: SDUPTHER

## 2022-05-12 ENCOUNTER — OFFICE VISIT (OUTPATIENT)
Dept: FAMILY MEDICINE CLINIC | Facility: CLINIC | Age: 67
End: 2022-05-12

## 2022-05-12 VITALS
HEIGHT: 72 IN | RESPIRATION RATE: 16 BRPM | WEIGHT: 174 LBS | HEART RATE: 88 BPM | OXYGEN SATURATION: 98 % | TEMPERATURE: 97.6 F | BODY MASS INDEX: 23.57 KG/M2 | DIASTOLIC BLOOD PRESSURE: 81 MMHG | SYSTOLIC BLOOD PRESSURE: 145 MMHG

## 2022-05-12 DIAGNOSIS — N52.9 ERECTILE DYSFUNCTION, UNSPECIFIED ERECTILE DYSFUNCTION TYPE: ICD-10-CM

## 2022-05-12 DIAGNOSIS — M65.342 TRIGGER RING FINGER OF LEFT HAND: Primary | ICD-10-CM

## 2022-05-12 DIAGNOSIS — G62.9 SENSORY NEUROPATHY: ICD-10-CM

## 2022-05-12 PROCEDURE — 99214 OFFICE O/P EST MOD 30 MIN: CPT | Performed by: FAMILY MEDICINE

## 2022-05-12 RX ORDER — TADALAFIL 20 MG/1
20 TABLET ORAL DAILY PRN
Qty: 30 TABLET | Refills: 1 | Status: SHIPPED | OUTPATIENT
Start: 2022-05-12 | End: 2023-05-12

## 2022-05-12 NOTE — PROGRESS NOTES
"Chief Complaint  Numbness (Numbness in both hands but worse in right x 2 months )    Subjective          Yaakov presents to [facility]  With trigger finger left ring finger and also some tingling numbness involving all digits right hand over the past month.  He has seen good sincalide in the past and is here for evaluation referral.  He has noticed some decreased  strength on the right recently.  No recent medication changes or injuries to explain current condition.        Objective   Vital Signs:   Vitals:    05/12/22 1556   BP: 145/81   Pulse: 88   Resp: 16   Temp: 97.6 °F (36.4 °C)   SpO2: 98%   Weight: 78.9 kg (174 lb)   Height: 182.9 cm (72\")        BMI is within normal parameters. No follow-up required.       Physical Exam  Constitutional:       General: He is not in acute distress.  Musculoskeletal:      Right hand: Normal strength.      Left hand: Tenderness (over flexor retinaculum of ring finger) present. Decreased range of motion (left ring finger flexion). Normal strength.   Neurological:      Mental Status: He is alert.          Result Review :                Assessment and Plan    Diagnoses and all orders for this visit:    1. Trigger ring finger of left hand (Primary)  Assessment & Plan:  Referral to hand surgeon.    Orders:  -     Cancel: Ambulatory Referral to Hand Surgery  -     Ambulatory Referral to Hand Surgery    2. Sensory neuropathy of right hand  Assessment & Plan:  New problem over the past month.  Involving all digits.  Some subjective decreased  strength reported.  Referral to hand surgeon for further evaluation.  Patient takes a normal multivitamin but will check for B supplements including B12 B6 and B2.    Orders:  -     Ambulatory Referral to Hand Surgery    3. Erectile dysfunction, unspecified erectile dysfunction type  Comments:  Condition is stable. The current medical regimen is effective;  continue present plan and medications.  Orders:  -     " tadalafil (CIALIS) 20 MG tablet; Take 1 tablet by mouth Daily As Needed for Erectile Dysfunction.  Dispense: 30 tablet; Refill: 1      Follow Up   No follow-ups on file.  Patient was given instructions and counseling regarding his condition or for health maintenance advice. Please see specific information pulled into the AVS if appropriate.

## 2022-05-12 NOTE — ASSESSMENT & PLAN NOTE
New problem over the past month.  Involving all digits.  Some subjective decreased  strength reported.  Referral to hand surgeon for further evaluation.  Patient takes a normal multivitamin but will check for B supplements including B12 B6 and B2.

## 2022-06-15 DIAGNOSIS — E78.00 PURE HYPERCHOLESTEROLEMIA: ICD-10-CM

## 2022-06-15 DIAGNOSIS — F41.9 ANXIETY: ICD-10-CM

## 2022-06-15 RX ORDER — BUPROPION HYDROCHLORIDE 300 MG/1
300 TABLET ORAL
Qty: 30 TABLET | Refills: 0 | Status: SHIPPED | OUTPATIENT
Start: 2022-06-15 | End: 2022-07-11 | Stop reason: SDUPTHER

## 2022-06-15 RX ORDER — ATORVASTATIN CALCIUM 40 MG/1
40 TABLET, FILM COATED ORAL
Qty: 30 TABLET | Refills: 0 | Status: SHIPPED | OUTPATIENT
Start: 2022-06-15 | End: 2022-07-11 | Stop reason: SDUPTHER

## 2022-06-15 NOTE — TELEPHONE ENCOUNTER
Caller: Dylan Mosher    Relationship: Self    Best call back number: [phone]  Requested Prescriptions:   Requested Prescriptions     Pending Prescriptions Disp Refills   • buPROPion XL (WELLBUTRIN XL) 300 MG 24 hr tablet 90 tablet 1     Sig: Take 1 tablet by mouth every night at bedtime.   • atorvastatin (LIPITOR) 40 MG tablet 90 tablet 1     Sig: Take 1 tablet by mouth every night at bedtime.        [facility] [address] [facility] [phone] [facility] [phone] FX     Additional details provided by patient:PATIENT CALLED AND STATED HE WOULD LIKE MEDICINE CALLED INTO [facility]  PHARMACY ON [facility] IN [place].    Does the patient have less than a 3 day supply:  [x] Yes  [] No    Chris VALLES Rep   06/15/22 15:13 EDT

## 2022-07-11 ENCOUNTER — OFFICE VISIT (OUTPATIENT)
Dept: FAMILY MEDICINE CLINIC | Facility: CLINIC | Age: 67
End: 2022-07-11

## 2022-07-11 VITALS
TEMPERATURE: 97.6 F | SYSTOLIC BLOOD PRESSURE: 124 MMHG | OXYGEN SATURATION: 97 % | WEIGHT: 174 LBS | DIASTOLIC BLOOD PRESSURE: 70 MMHG | BODY MASS INDEX: 23.57 KG/M2 | HEART RATE: 82 BPM | RESPIRATION RATE: 18 BRPM | HEIGHT: 72 IN

## 2022-07-11 DIAGNOSIS — R35.1 NOCTURIA: ICD-10-CM

## 2022-07-11 DIAGNOSIS — R13.19 OTHER DYSPHAGIA: ICD-10-CM

## 2022-07-11 DIAGNOSIS — I25.10 CORONARY ARTERY DISEASE INVOLVING NATIVE CORONARY ARTERY OF NATIVE HEART WITHOUT ANGINA PECTORIS: ICD-10-CM

## 2022-07-11 DIAGNOSIS — Z00.00 ENCOUNTER FOR WELLNESS EXAMINATION IN ADULT: Primary | ICD-10-CM

## 2022-07-11 DIAGNOSIS — Z23 NEED FOR VACCINATION: ICD-10-CM

## 2022-07-11 DIAGNOSIS — F41.9 ANXIETY: ICD-10-CM

## 2022-07-11 DIAGNOSIS — I70.0 AORTIC ATHEROSCLEROSIS: ICD-10-CM

## 2022-07-11 DIAGNOSIS — E78.00 PURE HYPERCHOLESTEROLEMIA: ICD-10-CM

## 2022-07-11 DIAGNOSIS — E11.9 TYPE 2 DIABETES MELLITUS WITHOUT COMPLICATION, WITHOUT LONG-TERM CURRENT USE OF INSULIN: ICD-10-CM

## 2022-07-11 PROCEDURE — 90471 IMMUNIZATION ADMIN: CPT | Performed by: FAMILY MEDICINE

## 2022-07-11 PROCEDURE — 99397 PER PM REEVAL EST PAT 65+ YR: CPT | Performed by: FAMILY MEDICINE

## 2022-07-11 PROCEDURE — 90677 PCV20 VACCINE IM: CPT | Performed by: FAMILY MEDICINE

## 2022-07-11 PROCEDURE — 99214 OFFICE O/P EST MOD 30 MIN: CPT | Performed by: FAMILY MEDICINE

## 2022-07-11 RX ORDER — METFORMIN HYDROCHLORIDE 500 MG/1
2000 TABLET, EXTENDED RELEASE ORAL
Qty: 360 TABLET | Refills: 2 | Status: SHIPPED | OUTPATIENT
Start: 2022-07-11 | End: 2023-04-07

## 2022-07-11 RX ORDER — BUPROPION HYDROCHLORIDE 300 MG/1
300 TABLET ORAL
Qty: 90 TABLET | Refills: 2 | Status: SHIPPED | OUTPATIENT
Start: 2022-07-11 | End: 2023-04-07

## 2022-07-11 RX ORDER — OMEPRAZOLE 40 MG/1
40 CAPSULE, DELAYED RELEASE ORAL DAILY
Qty: 90 CAPSULE | Refills: 0 | Status: SHIPPED | OUTPATIENT
Start: 2022-07-11 | End: 2022-09-20 | Stop reason: SDUPTHER

## 2022-07-11 RX ORDER — ATORVASTATIN CALCIUM 40 MG/1
40 TABLET, FILM COATED ORAL
Qty: 90 TABLET | Refills: 2 | Status: SHIPPED | OUTPATIENT
Start: 2022-07-11 | End: 2023-04-07

## 2022-07-11 NOTE — ASSESSMENT & PLAN NOTE
(new problem to this provider)  Not controlled with 20 mg dose and worsening history of frequency and intensity of this condition.   Continue with over-the-counter proton pump inhibitor but increase dose to 40 mg.  Referral to gastroenterology.  No history of abnormal weight loss or bleeding per patient history.  Patient will collect H. pylori breath test.

## 2022-07-11 NOTE — PROGRESS NOTES
Injection  Injection performed in LD by Shyla Palmer MA. Patient tolerated the procedure well without complications.  07/11/22   Shyla Palmer MA

## 2022-07-11 NOTE — PROGRESS NOTES
"Chief Complaint  Annual Exam    Subjective          Yaakov presents to [facility]  For annual wellness visit and also to review labs and refill medications.  He does feel well.  His lipids are to goal.  Type 2 diabetes his control with hemoglobin A1c 6.6.  Mild progression of hemoglobin A1c is noted.  Nocturia symptoms stable and PSA is well within normal limits.  He continues to take aspirin therapy for his known history of aortic atherosclerosis and coronary artery disease history.  No chest pains or shortness of breath reported.    Preventative measures have been discussed.  Eye exam and dental exam up-to-date.  He has routine exercise habits.  Review of Systems   Gastrointestinal:        See HPI   Genitourinary:        Nocturia   All other systems reviewed and are negative.    Additional problem of difficulty with swallowing and sometimes food sticking has been ongoing for a while but worse over the past 6 months.  No abnormal flag signs like weight loss or bleeding reported.  Patient has been taking over-the-counter omeprazole with some relief.  Objective   Vital Signs:   Vitals:    07/11/22 1301   BP: 124/70   Pulse: 82   Resp: 18   Temp: 97.6 °F (36.4 °C)   SpO2: 97%   Weight: 78.9 kg (174 lb)   Height: 182.9 cm (72\")        BMI is within normal parameters. No other follow-up for BMI required.        Physical Exam  Constitutional:       Appearance: Normal appearance. He is well-developed.   HENT:      Head: Normocephalic.      Right Ear: Hearing, tympanic membrane and external ear normal.      Left Ear: Hearing, tympanic membrane and external ear normal.   Eyes:      General: Lids are normal.      Conjunctiva/sclera: Conjunctivae normal.      Pupils: Pupils are equal, round, and reactive to light.      Funduscopic exam:     Right eye: No AV nicking or papilledema.         Left eye: No AV nicking or papilledema.   Neck:      Thyroid: No thyroid mass or thyromegaly.      Vascular: No " carotid bruit or JVD.      Trachea: Trachea normal.   Cardiovascular:      Rate and Rhythm: Normal rate and regular rhythm.      Pulses: Normal pulses.      Heart sounds: Normal heart sounds. No murmur heard.  Pulmonary:      Effort: Pulmonary effort is normal.      Breath sounds: Normal breath sounds.   Chest:   Breasts:      Right: No supraclavicular adenopathy.      Left: No supraclavicular adenopathy.       Abdominal:      General: Bowel sounds are normal.      Palpations: Abdomen is soft.      Tenderness: There is no abdominal tenderness.   Musculoskeletal:         General: Normal range of motion.      Cervical back: Normal range of motion and neck supple.      Lumbar back: No bony tenderness.   Lymphadenopathy:      Cervical: No cervical adenopathy.      Upper Body:      Right upper body: No supraclavicular adenopathy.      Left upper body: No supraclavicular adenopathy.   Skin:     General: Skin is warm and dry.      Findings: No rash.      Nails: There is no clubbing.   Neurological:      Mental Status: He is alert and oriented to person, place, and time.      Cranial Nerves: No cranial nerve deficit.      Deep Tendon Reflexes:      Reflex Scores:       Patellar reflexes are 2+ on the right side and 2+ on the left side.  Psychiatric:         Speech: Speech normal.         Behavior: Behavior normal.         Thought Content: Thought content normal.         Judgment: Judgment normal.          Result Review :     The following data was reviewed by: Dylan Paulson MD on 07/11/2022:  PSA DIAGNOSTIC (07/07/2022 09:56)-nl  MicroAlbumin, Urine, Random - Urine, Clean Catch (07/07/2022 09:56)-5.6  Hemoglobin A1c (07/07/2022 09:56)-6.6  CK (07/07/2022 09:56)  Lipid Panel With LDL / HDL Ratio (07/07/2022 09:56)-LDL49  CBC & Differential (07/07/2022 09:56)  Comprehensive Metabolic Panel (07/07/2022 09:56)           Assessment and Plan    Diagnoses and all orders for this visit:    1. Encounter for wellness examination in  adult (Primary)  Comments:  Normal physical exam.  Patient due for Prevnar 20 which is administered today.  Continue with healthy diet and exercise.    2. Other dysphagia  Assessment & Plan:  (new problem to this provider)  Not controlled with 20 mg dose and worsening history of frequency and intensity of this condition.   Continue with over-the-counter proton pump inhibitor but increase dose to 40 mg.  Referral to gastroenterology.  No history of abnormal weight loss or bleeding per patient history.  Patient will collect H. pylori breath test.    Orders:  -     Ambulatory Referral to Gastroenterology  -     H. Pylori Breath Test - Breath, Lung; Future  -     omeprazole (priLOSEC) 40 MG capsule; Take 1 capsule by mouth Daily for 90 days.  Dispense: 90 capsule; Refill: 0    3. Type 2 diabetes mellitus without complication, without long-term current use of insulin (HCC)  Assessment & Plan:  Condition is stable. The current medical regimen is effective;  continue present plan and medications.    Orders:  -     metFORMIN ER (GLUCOPHAGE-XR) 500 MG 24 hr tablet; Take 4 tablets by mouth Daily With Dinner for 270 days.  Dispense: 360 tablet; Refill: 2  -     Comprehensive Metabolic Panel; Future  -     Hemoglobin A1c; Future  -     MicroAlbumin, Urine, Random - Urine, Clean Catch; Future    4. Anxiety  Assessment & Plan:  Condition is stable. The current medical regimen is effective;  continue present plan and medications.    Orders:  -     buPROPion XL (WELLBUTRIN XL) 300 MG 24 hr tablet; Take 1 tablet by mouth every night at bedtime for 270 days.  Dispense: 90 tablet; Refill: 2    5. Pure hypercholesterolemia  Assessment & Plan:  Condition is stable. The current medical regimen is effective;  continue present plan and medications.    Orders:  -     atorvastatin (LIPITOR) 40 MG tablet; Take 1 tablet by mouth every night at bedtime for 270 days.  Dispense: 90 tablet; Refill: 2  -     Comprehensive Metabolic Panel; Future  -      CBC & Differential; Future  -     Lipid Panel With / Chol / HDL Ratio; Future  -     CK; Future    6. Need for vaccination  -     Pneumococcal Conjugate Vaccine 20-Valent All    7. Nocturia  Assessment & Plan:  Nocturia symptoms are stable. PSA will be monitored with annual labs.       8. Coronary artery disease involving native coronary artery of native heart without angina pectoris  Assessment & Plan:  Condition is stable. The current medical regimen is effective;  continue present plan and medications.  LDL goals met on current therapy.  Continue low-dose aspirin.      9. Aortic atherosclerosis (HCC)  Assessment & Plan:  Condition is stable. The current medical regimen is effective;  continue present plan and medications.    Orders:  -     Lipid Panel With / Chol / HDL Ratio; Future      Follow Up   Return in about 6 months (around 1/11/2023) for recheck/refill medication.  Patient was given instructions and counseling regarding his condition or for health maintenance advice. Please see specific information pulled into the AVS if appropriate.

## 2022-07-11 NOTE — ASSESSMENT & PLAN NOTE
Condition is stable. The current medical regimen is effective;  continue present plan and medications.  LDL goals met on current therapy.  Continue low-dose aspirin.

## 2022-08-08 ENCOUNTER — OFFICE VISIT (OUTPATIENT)
Dept: GASTROENTEROLOGY | Facility: CLINIC | Age: 67
End: 2022-08-08

## 2022-08-08 VITALS
SYSTOLIC BLOOD PRESSURE: 114 MMHG | HEIGHT: 73 IN | DIASTOLIC BLOOD PRESSURE: 73 MMHG | BODY MASS INDEX: 22.86 KG/M2 | TEMPERATURE: 96.9 F | WEIGHT: 172.5 LBS

## 2022-08-08 DIAGNOSIS — R13.19 OTHER DYSPHAGIA: Primary | ICD-10-CM

## 2022-08-08 PROCEDURE — 99204 OFFICE O/P NEW MOD 45 MIN: CPT | Performed by: INTERNAL MEDICINE

## 2022-08-08 NOTE — PROGRESS NOTES
Chief Complaint   Patient presents with   • Difficulty Swallowing     Subjective     HPI  Dylan Mosher is a 66 y.o. male who presents today for new office visit  Remittent dysphagia to solids and pills  EGD 20 years ago reportedly normal  No weight loss  No vomiting  Occasionally has to drink plenty of water to push food down  Also has reflux well controlled with omeprazole 40 mg a day  Most recent colonoscopy last year within normal limits    Objective   Vitals:    08/08/22 1413   BP: 114/73   Temp: 96.9 °F (36.1 °C)       Physical Exam  Vitals reviewed.   Constitutional:       Appearance: He is well-developed.   HENT:      Head: Normocephalic and atraumatic.   Abdominal:      General: Bowel sounds are normal. There is no distension.      Palpations: Abdomen is soft. There is no mass.      Tenderness: There is no abdominal tenderness.      Hernia: No hernia is present.   Skin:     General: Skin is warm and dry.   Neurological:      Mental Status: He is alert and oriented to person, place, and time.   Psychiatric:         Behavior: Behavior normal.         Thought Content: Thought content normal.         Judgment: Judgment normal.       The following data was reviewed by: Jai Parks MD on 08/08/2022:  Common labs    Common Labsle 11/15/21 11/15/21 11/15/21 11/15/21 11/15/21 12/29/21 7/7/22 7/7/22 7/7/22 7/7/22 7/7/22 7/7/22    0813 0813 0813 0813 0813  0956 [id number] 0956 0956 [id number] 0956   Glucose 106 (A)      101 (A)        BUN 15      15        Creatinine 1.09      1.26        eGFR Non  Am 70              eGFR  Am 81              Sodium 142      140        Potassium 4.3      4.7        Chloride 103      102        Calcium 9.9      9.8        Total Protein 7.3      7.0        Albumin 4.9 (A)      4.7        Total Bilirubin 0.5      0.6        Alkaline Phosphatase 85      83        AST (SGOT) 26      19        ALT (SGPT) 38      24        WBC  5.8    8.95  5.5       Hemoglobin  14.2    13.1 (A)   14.1       Hematocrit  44.4    40.8 (A)  42.6       Platelets  232    232  260       Total Cholesterol   144      113      Triglycerides   89      70      HDL Cholesterol   51      49      LDL Cholesterol    76      49      Hemoglobin A1C    6.4 (A)      6.6 (A)     Microalbumin, Urine     3.5      5.6    PSA            0.7   (A) Abnormal value       Comments are available for some flowsheets but are not being displayed.           CBC    CBC 11/15/21 12/29/21 7/7/22   WBC 5.8 8.95 5.5   RBC 4.83 4.42 (A) 4.67   Hemoglobin 14.2 13.1 (A) 14.1   Hematocrit 44.4 40.8 (A) 42.6   MCV 92 92.3 91   MCH 29.4 29.6 30.2   MCHC 32.0 32.1 33.1   RDW 12.7 13.1 13.2   Platelets 232 232 260   (A) Abnormal value            CBC w/diff    CBC w/Diff 11/15/21 12/29/21 7/7/22   WBC 5.8 8.95 5.5   RBC 4.83 4.42 (A) 4.67   Hemoglobin 14.2 13.1 (A) 14.1   Hematocrit 44.4 40.8 (A) 42.6   MCV 92 92.3 91   MCH 29.4 29.6 30.2   MCHC 32.0 32.1 33.1   RDW 12.7 13.1 13.2   Platelets 232 232 260   Neutrophil Rel % 55 65.0 65   Immature Granulocyte Rel %  0.2    Lymphocyte Rel % 26 19.1 21   Monocyte Rel % 12 10.6 9   Eosinophil Rel % 5 4.1 4   Basophil Rel % 2 1.0 1   (A) Abnormal value            Lipid Panel    Lipid Panel 11/15/21 7/7/22   Total Cholesterol 144 113   Triglycerides 89 70   HDL Cholesterol 51 49   VLDL Cholesterol 17 15   LDL Cholesterol  76 49   LDL/HDL Ratio  1.0      Comments are available for some flowsheets but are not being displayed.               Electrolytes    Electrolytes 11/15/21 7/7/22   Sodium 142 140   Potassium 4.3 4.7   Chloride 103 102   Calcium 9.9 9.8           Renal Profile    Renal Profile 11/15/21 7/7/22   BUN 15 15   Creatinine 1.09 1.26   eGFR Non  Am 70    eGFR  Am 81       Comments are available for some flowsheets but are not being displayed.           Most Recent A1C    HGBA1C Most Recent 7/7/22   Hemoglobin A1C 6.6 (A)   (A) Abnormal value       Comments are available for some flowsheets  but are not being displayed.           Data reviewed: Consultant notes I have read PCP note from last month, omeprazole was increased to 40 mg a day with good results     Assessment & Plan   Assessment:     GERD  Dysphagia to solids and pills        Plan:   Schedule EGD with dilation  Continue omeprazole 40 mg p.o. every morning    I spent 30 minutes caring for Dylan on this date of service. This time includes time spent by me in the following activities:preparing for the visit, reviewing tests, obtaining and/or reviewing a separately obtained history, performing a medically appropriate examination and/or evaluation , counseling and educating the patient/family/caregiver, ordering medications, tests, or procedures, referring and communicating with other health care professionals , documenting information in the medical record, independently interpreting results and communicating that information with the patient/family/caregiver and care coordination    Jai Parks MD  [facility]  [address] [place] [address]  Office: [phone]

## 2022-09-06 ENCOUNTER — TELEPHONE (OUTPATIENT)
Dept: FAMILY MEDICINE CLINIC | Facility: CLINIC | Age: 67
End: 2022-09-06

## 2022-09-06 NOTE — TELEPHONE ENCOUNTER
Caller: Dylan Mosher    Relationship: Self    Best call back number: [phone]    What is the medical concern/diagnosis: STOMACH ISSUES    What specialty or service is being requested: GASTRONOLOGIST

## 2022-09-20 ENCOUNTER — OFFICE VISIT (OUTPATIENT)
Dept: GASTROENTEROLOGY | Facility: CLINIC | Age: 67
End: 2022-09-20

## 2022-09-20 ENCOUNTER — TELEPHONE (OUTPATIENT)
Dept: GASTROENTEROLOGY | Facility: CLINIC | Age: 67
End: 2022-09-20

## 2022-09-20 VITALS
WEIGHT: 167.6 LBS | BODY MASS INDEX: 22.7 KG/M2 | SYSTOLIC BLOOD PRESSURE: 132 MMHG | TEMPERATURE: 96.6 F | HEIGHT: 72 IN | DIASTOLIC BLOOD PRESSURE: 85 MMHG

## 2022-09-20 DIAGNOSIS — R13.19 OTHER DYSPHAGIA: ICD-10-CM

## 2022-09-20 PROCEDURE — 99213 OFFICE O/P EST LOW 20 MIN: CPT | Performed by: NURSE PRACTITIONER

## 2022-09-20 RX ORDER — OMEPRAZOLE 40 MG/1
40 CAPSULE, DELAYED RELEASE ORAL DAILY
Qty: 90 CAPSULE | Refills: 3 | Status: SHIPPED | OUTPATIENT
Start: 2022-09-20

## 2022-09-20 NOTE — TELEPHONE ENCOUNTER
MICHAEL patient via telephone for. Scheduled 12/06/2022 with arrival time of 0730AM . Prep paperwork mailed to verified address on file. Patient advised arrival time may change based on [facility] guidelines. MICHAEL VILLA

## 2022-09-20 NOTE — PROGRESS NOTES
Chief Complaint   Patient presents with   • Heartburn   • Difficulty Swallowing       HPI    Dylan Mosher is a  67 y.o. male here for a follow up visit for heartburn with dysphagia.    This patient was recently evaluated by Dr. Parks for new patient consultation less than 2 months ago.  Patient reported intermittent dysphagia to solids and pills.  History of normal EGD 20 years ago.  He was started on omeprazole and recommended to schedule EGD with possible dilation.  Most recent colonoscopy last year within normal limits.    Today his symptoms are unchanged.  He admits he cannot recall that he met with Dr. Parks recently.  He never scheduled his EGD nor did he begin omeprazole.  Still complains of dysphagia with solids and pills.  No weight loss.    No lower GI complaints.    Past Medical History:   Diagnosis Date   • Acute blood loss anemia 2019   • Anorgasmia 2008   • Anxiety 2012   • Colitis 2019   • Colon polyp    • Controlled diabetes mellitus type II without complication (HCC) 2014   • CTS (carpal tunnel syndrome) 2009    bilateral   • CTS (carpal tunnel syndrome) 2009    right   • Diverticulitis of colon without hemorrhage    • Esophagitis     unspecified   • Fatigue 2008    daytime somnolence   • Grief reaction 2014    fiance of 6 years  of colon cancer   • History of stomach ulcers    • History of stress test 2012    normal   • LFT elevation 01/15/2014   • Pure hypercholesterolemia 2011   • Screening for prostate cancer 2008   • Sleep apnea 2008   • Tinnitus 2013   • Trapezius strain 2007    right with radiculopathy   • Trigger middle finger of left hand 2015   • Type II diabetes mellitus, uncontrolled 2011       Past Surgical History:   Procedure Laterality Date   • CHOLECYSTECTOMY  2013    Dr Newton   • COLONOSCOPY     • ENDOSCOPY  08/15/2012    esophagitis   • EYE SURGERY  2018     left eye   • UPPER GASTROINTESTINAL ENDOSCOPY     • WRIST SURGERY Left 2011    carpal tunnel       Scheduled Meds:     Continuous Infusions: No current facility-administered medications for this visit.      PRN Meds:     No Known Allergies    Social History     Socioeconomic History   • Marital status:    • Number of children: 4   Tobacco Use   • Smoking status: Former Smoker     Packs/day: 1.50     Years: 20.00     Pack years: 30.00     Start date: 1971     Quit date: 1991     Years since quittin.7   • Smokeless tobacco: Never Used   Substance and Sexual Activity   • Alcohol use: Not Currently     Comment: current some day; 2/month   • Drug use: No   • Sexual activity: Not Currently       Family History   Problem Relation Age of Onset   • Diabetes Mother         type II   • Cirrhosis Father    • Alcohol abuse Father    • Diabetes Brother         Type II   • Heart disease Paternal Grandmother 80   • Stroke Paternal Grandmother 80           • ADD / ADHD Son        Review of Systems   Constitutional: Negative for activity change, appetite change, fatigue, fever and unexpected weight change.   HENT: Positive for trouble swallowing.    Respiratory: Negative for apnea, cough, choking, chest tightness, shortness of breath and wheezing.    Cardiovascular: Negative for chest pain, palpitations and leg swelling.   Gastrointestinal: Negative for abdominal distention, abdominal pain, anal bleeding, blood in stool, constipation, diarrhea, nausea, rectal pain and vomiting.       Vitals:    22 0932   BP: 132/85   Temp: 96.6 °F (35.9 °C)       Physical Exam  Constitutional:       Appearance: He is well-developed.   Abdominal:      General: Bowel sounds are normal. There is no distension.      Palpations: Abdomen is soft. There is no mass.      Tenderness: There is no abdominal tenderness. There is no guarding.      Hernia: No hernia is present.   Skin:     General: Skin is warm and dry.       Capillary Refill: Capillary refill takes less than 2 seconds.   Neurological:      Mental Status: He is alert and oriented to person, place, and time.   Psychiatric:         Behavior: Behavior normal.     Assessment    Diagnoses and all orders for this visit:    1. Other dysphagia  -     omeprazole (priLOSEC) 40 MG capsule; Take 1 capsule by mouth Daily.  Dispense: 90 capsule; Refill: 3      Plan    Pleasant 67-year-old male seen today for dysphagia did not follow through on prior recommendations because he cannot recall actually meeting with Dr. Parks.  We discussed the risk/benefits of EGD in the setting of dysphagia.  All questions were answered.    Differential diagnosis for the dysphagia includes GERD, peptic stricture, ring/band, eosinophilic esophagitis, or esophageal dysmotility or hypersensitivity, malignancy. We will proceed with EGD with biopsies of the distal and mid esophagus, and possible empiric dilation of the esophagus. Meanwhile, we will assess for improvement in symptoms on PPI as above.  Written prescription provided to the patient to take to his pharmacy.    Patient was walked to check out to schedule EGD.    Further recommendations and follow-up pending endoscopic findings.         STACEY Hagan  [facility]  [address] [place] [address]  Office: [phone]

## 2022-12-06 ENCOUNTER — HOSPITAL ENCOUNTER (OUTPATIENT)
Facility: HOSPITAL | Age: 67
Setting detail: HOSPITAL OUTPATIENT SURGERY
Discharge: HOME | End: 2022-12-06
Attending: INTERNAL MEDICINE | Admitting: INTERNAL MEDICINE

## 2022-12-06 ENCOUNTER — ANESTHESIA EVENT (OUTPATIENT)
Dept: GASTROENTEROLOGY | Facility: HOSPITAL | Age: 67
End: 2022-12-06

## 2022-12-06 ENCOUNTER — ANESTHESIA (OUTPATIENT)
Dept: GASTROENTEROLOGY | Facility: HOSPITAL | Age: 67
End: 2022-12-06

## 2022-12-06 VITALS
BODY MASS INDEX: 21.37 KG/M2 | RESPIRATION RATE: 16 BRPM | DIASTOLIC BLOOD PRESSURE: 82 MMHG | WEIGHT: 157.8 LBS | TEMPERATURE: 97.9 F | OXYGEN SATURATION: 97 % | SYSTOLIC BLOOD PRESSURE: 123 MMHG | HEIGHT: 72 IN | HEART RATE: 67 BPM

## 2022-12-06 DIAGNOSIS — R13.19 OTHER DYSPHAGIA: ICD-10-CM

## 2022-12-06 LAB — GLUCOSE BLDC GLUCOMTR-MCNC: 95 MG/DL (ref 70–130)

## 2022-12-06 PROCEDURE — 43450 DILATE ESOPHAGUS 1/MULT PASS: CPT | Performed by: INTERNAL MEDICINE

## 2022-12-06 PROCEDURE — S0260 H&P FOR SURGERY: HCPCS | Performed by: INTERNAL MEDICINE

## 2022-12-06 PROCEDURE — 43239 EGD BIOPSY SINGLE/MULTIPLE: CPT | Performed by: INTERNAL MEDICINE

## 2022-12-06 PROCEDURE — 25010000002 PROPOFOL 10 MG/ML EMULSION: Performed by: ANESTHESIOLOGY

## 2022-12-06 PROCEDURE — 82962 GLUCOSE BLOOD TEST: CPT

## 2022-12-06 PROCEDURE — 88305 TISSUE EXAM BY PATHOLOGIST: CPT | Performed by: INTERNAL MEDICINE

## 2022-12-06 RX ORDER — PROPOFOL 10 MG/ML
VIAL (ML) INTRAVENOUS CONTINUOUS PRN
Status: DISCONTINUED | OUTPATIENT
Start: 2022-12-06 | End: 2022-12-06 | Stop reason: SURG

## 2022-12-06 RX ORDER — SODIUM CHLORIDE 9 MG/ML
40 INJECTION, SOLUTION INTRAVENOUS AS NEEDED
Status: DISCONTINUED | OUTPATIENT
Start: 2022-12-06 | End: 2022-12-06 | Stop reason: HOSPADM

## 2022-12-06 RX ORDER — SODIUM CHLORIDE, SODIUM LACTATE, POTASSIUM CHLORIDE, CALCIUM CHLORIDE 600; 310; 30; 20 MG/100ML; MG/100ML; MG/100ML; MG/100ML
INJECTION, SOLUTION INTRAVENOUS CONTINUOUS PRN
Status: DISCONTINUED | OUTPATIENT
Start: 2022-12-06 | End: 2022-12-06 | Stop reason: SURG

## 2022-12-06 RX ORDER — SODIUM CHLORIDE 0.9 % (FLUSH) 0.9 %
10 SYRINGE (ML) INJECTION EVERY 12 HOURS SCHEDULED
Status: DISCONTINUED | OUTPATIENT
Start: 2022-12-06 | End: 2022-12-06 | Stop reason: HOSPADM

## 2022-12-06 RX ORDER — PROPOFOL 10 MG/ML
VIAL (ML) INTRAVENOUS AS NEEDED
Status: DISCONTINUED | OUTPATIENT
Start: 2022-12-06 | End: 2022-12-06 | Stop reason: SURG

## 2022-12-06 RX ORDER — SODIUM CHLORIDE, SODIUM LACTATE, POTASSIUM CHLORIDE, CALCIUM CHLORIDE 600; 310; 30; 20 MG/100ML; MG/100ML; MG/100ML; MG/100ML
30 INJECTION, SOLUTION INTRAVENOUS CONTINUOUS PRN
Status: DISCONTINUED | OUTPATIENT
Start: 2022-12-06 | End: 2022-12-06 | Stop reason: HOSPADM

## 2022-12-06 RX ORDER — SODIUM CHLORIDE 0.9 % (FLUSH) 0.9 %
10 SYRINGE (ML) INJECTION AS NEEDED
Status: DISCONTINUED | OUTPATIENT
Start: 2022-12-06 | End: 2022-12-06 | Stop reason: HOSPADM

## 2022-12-06 RX ADMIN — PROPOFOL 100 MG: 10 INJECTION, EMULSION INTRAVENOUS at 09:22

## 2022-12-06 RX ADMIN — SODIUM CHLORIDE, POTASSIUM CHLORIDE, SODIUM LACTATE AND CALCIUM CHLORIDE: 600; 310; 30; 20 INJECTION, SOLUTION INTRAVENOUS at 09:23

## 2022-12-06 RX ADMIN — Medication 100 MCG/KG/MIN: at 09:24

## 2022-12-06 RX ADMIN — SODIUM CHLORIDE, POTASSIUM CHLORIDE, SODIUM LACTATE AND CALCIUM CHLORIDE 30 ML/HR: 600; 310; 30; 20 INJECTION, SOLUTION INTRAVENOUS at 08:03

## 2022-12-06 NOTE — H&P
[facility]  Pre Procedure History & Physical    Chief Complaint:   Time for my egd     Subjective     HPI:   67 y.o. male presenting to endoscopy unit today for surveillance egd   Past Medical History:   Past Medical History:   Diagnosis Date   • Acute blood loss anemia 2019   • Anorgasmia 2008   • Anxiety 2012   • Colitis 2019   • Colon polyp    • Controlled diabetes mellitus type II without complication (HCC) 2014   • CTS (carpal tunnel syndrome) 2009    bilateral   • CTS (carpal tunnel syndrome) 2009    right   • Diverticulitis of colon without hemorrhage    • Esophagitis     unspecified   • Fatigue 2008    daytime somnolence   • GERD (gastroesophageal reflux disease)    • Grief reaction 2014    fiance of 6 years  of colon cancer   • History of stomach ulcers    • History of stress test 2012    normal   • LFT elevation 01/15/2014   • PONV (postoperative nausea and vomiting)    • Pure hypercholesterolemia 2011   • Screening for prostate cancer 2008   • Sleep apnea 2008    no machine   • Tinnitus 2013   • Trapezius strain 2007    right with radiculopathy   • Trigger middle finger of left hand 2015   • Type II diabetes mellitus, uncontrolled 2011       Family History:  Family History   Problem Relation Age of Onset   • Diabetes Mother         type II   • Cirrhosis Father    • Alcohol abuse Father    • Diabetes Brother         Type II   • ADD / ADHD Son    • Heart disease Paternal Grandmother 80   • Stroke Paternal Grandmother 80           • Malig Hyperthermia Neg Hx        Social History:   reports that he quit smoking about 31 years ago. His smoking use included cigarettes. He started smoking about 51 years ago. He has a 30.00 pack-year smoking history. He has never used smokeless tobacco. He reports that he does not currently use alcohol. He reports that he does not use  "drugs.    Medications:   Medications Prior to Admission   Medication Sig Dispense Refill Last Dose   • aspirin 81 MG EC tablet Take 81 mg by mouth Daily. Take 1 tablet by oral route once daily   12/5/2022   • atorvastatin (LIPITOR) 40 MG tablet Take 1 tablet by mouth every night at bedtime for 270 days. 90 tablet 2 12/5/2022   • buPROPion XL (WELLBUTRIN XL) 300 MG 24 hr tablet Take 1 tablet by mouth every night at bedtime for 270 days. 90 tablet 2 12/5/2022   • fexofenadine (ALLEGRA) 180 MG tablet Take 180 mg by mouth Daily.   12/5/2022   • metFORMIN ER (GLUCOPHAGE-XR) 500 MG 24 hr tablet Take 4 tablets by mouth Daily With Dinner for 270 days. 360 tablet 2 12/5/2022   • Multiple Vitamins-Minerals (multivitamin with minerals) tablet tablet Take 1 tablet by mouth Daily.   12/5/2022   • omeprazole (priLOSEC) 40 MG capsule Take 1 capsule by mouth Daily. 90 capsule 3 12/5/2022   • tadalafil (CIALIS) 20 MG tablet Take 1 tablet by mouth Daily As Needed for Erectile Dysfunction. 30 tablet 1 12/5/2022   • cephalexin (KEFLEX) 500 MG capsule 2 PO BID 28 capsule 0        Allergies:  Patient has no known allergies.    ROS:    Pertinent items are noted in HPI     Objective     Blood pressure 123/78, pulse 81, temperature 97.9 °F (36.6 °C), temperature source Oral, resp. rate 18, height 182.9 cm (72\"), weight 71.6 kg (157 lb 12.8 oz), SpO2 99 %.    Physical Exam   Constitutional: Pt is oriented to person, place, and time and well-developed, well-nourished, and in no distress.   Abdominal: Soft.   Psychiatric: Mood, memory, affect and judgment normal.     Assessment & Plan     Diagnosis:  dysphagia    Anticipated Surgical Procedure:  egd     The risks, benefits, and alternatives of this procedure have been discussed with the patient or the responsible party- the patient understands and agrees to proceed.                                                                "

## 2022-12-06 NOTE — DISCHARGE INSTRUCTIONS
For the next 24 hours patient needs to be with a responsible adult.    For 24 hours DO NOT drive, operate machinery, appliances, drink alcohol, make important decisions or sign legal documents.    Start with a light or bland diet if you are feeling sick to your stomach otherwise advance to regular diet as tolerated.    Follow recommendations on procedure report if provided by your doctor.    Call Dr. Parks for problems [phone].    Problems may include but not limited to: large amounts of bleeding, trouble breathing, repeated vomiting, severe unrelieved pain, fever or chills.

## 2022-12-06 NOTE — ANESTHESIA PREPROCEDURE EVALUATION
Anesthesia Evaluation     Patient summary reviewed and Nursing notes reviewed   history of anesthetic complications: PONV  NPO Solid Status: > 8 hours  NPO Liquid Status: > 4 hours           Airway   Mallampati: II  Neck ROM: full  No difficulty expected  Dental - normal exam     Pulmonary     breath sounds clear to auscultation  (+) a smoker Former, sleep apnea,   Cardiovascular     Rhythm: regular    (+) CAD, hyperlipidemia,       Neuro/Psych  (+) numbness, psychiatric history Anxiety,    GI/Hepatic/Renal/Endo    (+)   diabetes mellitus,     Musculoskeletal     Abdominal    Substance History      OB/GYN          Other                        Anesthesia Plan    ASA 3     MAC     intravenous induction     Anesthetic plan, risks, benefits, and alternatives have been provided, discussed and informed consent has been obtained with: patient.        CODE STATUS:

## 2022-12-06 NOTE — ANESTHESIA POSTPROCEDURE EVALUATION
"Patient: Dylan Mosher    Procedure Summary     Date: 12/06/22 Room / Location:  [facility]    Anesthesia Start: 0919 Anesthesia Stop: 0937    Procedure: ESOPHAGOGASTRODUODENOSCOPY with biopsies and esophageal dilatation via 56, 60 graham (Esophagus) Diagnosis:       Other dysphagia      (Other dysphagia [R13.19])    Surgeons: Jai Parks MD Provider: David Callahan MD    Anesthesia Type: MAC ASA Status: 3          Anesthesia Type: MAC    Vitals  Vitals Value Taken Time   /82 12/06/22 0954   Temp     Pulse 67 12/06/22 0954   Resp 16 12/06/22 0954   SpO2 97 % 12/06/22 0954           Post Anesthesia Care and Evaluation    Patient location during evaluation: bedside  Patient participation: complete - patient participated  Level of consciousness: awake and alert  Pain management: adequate    Airway patency: patent  Anesthetic complications: No anesthetic complications    Cardiovascular status: acceptable  Respiratory status: acceptable  Hydration status: acceptable    Comments: /82 (BP Location: Left arm, Patient Position: Sitting)   Pulse 67   Temp 36.6 °C (97.9 °F) (Oral)   Resp 16   Ht 182.9 cm (72\")   Wt 71.6 kg (157 lb 12.8 oz)   SpO2 97%   BMI 21.40 kg/m²           "

## 2022-12-07 LAB
LAB AP CASE REPORT: NORMAL
LAB AP CLINICAL INFORMATION: NORMAL
PATH REPORT.FINAL DX SPEC: NORMAL
PATH REPORT.GROSS SPEC: NORMAL

## 2023-01-26 ENCOUNTER — TELEPHONE (OUTPATIENT)
Dept: GASTROENTEROLOGY | Facility: CLINIC | Age: 68
End: 2023-01-26
Payer: MEDICARE

## 2023-01-26 NOTE — TELEPHONE ENCOUNTER
----- Message from Jai Parks MD sent at 12/8/2022 12:04 PM EST -----  Lo's esophagus no dysplasia  EGD recall 3 years  Continue PPI  Office visit Ailyn 3-month

## 2023-01-26 NOTE — TELEPHONE ENCOUNTER
Letter mailed per policy.    Colonoscopy placed in recall for 12/6/25.    Colonoscopy placed in recall for 12/6/25.

## 2023-04-28 DIAGNOSIS — F41.9 ANXIETY: ICD-10-CM

## 2023-04-28 DIAGNOSIS — E78.00 PURE HYPERCHOLESTEROLEMIA: ICD-10-CM

## 2023-04-28 NOTE — TELEPHONE ENCOUNTER
Rx Refill Note  Requested Prescriptions     Pending Prescriptions Disp Refills   • buPROPion XL (WELLBUTRIN XL) 300 MG 24 hr tablet [Pharmacy Med Name: BUPROPION XL 300MG TABLETS] 30 tablet 0     Sig: TAKE 1 TABLET BY MOUTH AT BEDTIME   • atorvastatin (LIPITOR) 40 MG tablet [Pharmacy Med Name: ATORVASTATIN 40MG TABLETS] 30 tablet 0     Sig: TAKE 1 TABLET BY MOUTH AT BEDTIME      Last office visit with prescribing clinician: 7/11/2022   Last telemedicine visit with prescribing clinician: Visit date not found   Next office visit with prescribing clinician: Visit date not found                           Denis Granados MA  04/28/23, 08:28 EDT        Sent a 30 day RX. Pt is needing an appointment.     Okay for the HUB to relay message

## 2023-04-30 RX ORDER — ATORVASTATIN CALCIUM 40 MG/1
TABLET, FILM COATED ORAL
Qty: 30 TABLET | Refills: 0 | Status: SHIPPED | OUTPATIENT
Start: 2023-04-30

## 2023-04-30 RX ORDER — BUPROPION HYDROCHLORIDE 300 MG/1
TABLET ORAL
Qty: 30 TABLET | Refills: 0 | Status: SHIPPED | OUTPATIENT
Start: 2023-04-30

## 2023-05-15 DIAGNOSIS — F41.9 ANXIETY: ICD-10-CM

## 2023-05-15 RX ORDER — BUPROPION HYDROCHLORIDE 300 MG/1
TABLET ORAL
Qty: 14 TABLET | Refills: 0 | Status: SHIPPED | OUTPATIENT
Start: 2023-05-15

## 2023-05-15 NOTE — TELEPHONE ENCOUNTER
Rx Refill Note  Requested Prescriptions     Pending Prescriptions Disp Refills   • buPROPion XL (WELLBUTRIN XL) 300 MG 24 hr tablet [Pharmacy Med Name: BUPROPION XL 300MG TABLETS] 30 tablet 0     Sig: TAKE 1 TABLET BY MOUTH AT BEDTIME      Last office visit with prescribing clinician: 7/11/2022   Next office visit with prescribing clinician: Visit date not found

## 2023-05-18 ENCOUNTER — TELEPHONE (OUTPATIENT)
Dept: FAMILY MEDICINE CLINIC | Facility: CLINIC | Age: 68
End: 2023-05-18
Payer: MEDICARE

## 2023-05-18 DIAGNOSIS — E11.9 TYPE 2 DIABETES MELLITUS WITHOUT COMPLICATION, WITHOUT LONG-TERM CURRENT USE OF INSULIN: Primary | ICD-10-CM

## 2023-05-18 DIAGNOSIS — E78.00 PURE HYPERCHOLESTEROLEMIA: ICD-10-CM

## 2023-05-18 NOTE — TELEPHONE ENCOUNTER
"Caller: Dylan Mosher \"Yaakov\"    Relationship: Self    Best call back number: [phone]    What orders are you requesting (i.e. lab or imaging): LABS FOR 6 MONTH FOLLOW UP    In what timeframe would the patient need to come in: BEFORE FOLLOW UP ON 06-    Additional notes: PLEASE CALL TO SCHEDULE LABS WHEN ORDERS ARE ENTERED.  "

## 2023-05-23 ENCOUNTER — TELEPHONE (OUTPATIENT)
Dept: FAMILY MEDICINE CLINIC | Facility: CLINIC | Age: 68
End: 2023-05-23

## 2023-05-23 NOTE — TELEPHONE ENCOUNTER
"  Caller: Dylan Mosher \"Yaakov\"    Relationship to patient: Self    Best call back number: [phone]    Chief complaint: LABS    Type of visit: LABS    If rescheduling, when is the original appointment: 5/22/23 @2PM     Additional notes:PLEASE CALL TO RESCHEDULE  "

## 2023-06-03 DIAGNOSIS — E78.00 PURE HYPERCHOLESTEROLEMIA: ICD-10-CM

## 2023-06-03 LAB
ALBUMIN SERPL-MCNC: 4.6 G/DL (ref 3.5–5.2)
ALBUMIN/CREAT UR: 5 MG/G CREAT (ref 0–29)
ALBUMIN/GLOB SERPL: 2 G/DL
ALP SERPL-CCNC: 79 U/L (ref 39–117)
ALT SERPL-CCNC: 21 U/L (ref 1–41)
AST SERPL-CCNC: 20 U/L (ref 1–40)
BASOPHILS # BLD AUTO: 0.09 10*3/MM3 (ref 0–0.2)
BASOPHILS NFR BLD AUTO: 1.4 % (ref 0–1.5)
BILIRUB SERPL-MCNC: 0.5 MG/DL (ref 0–1.2)
BUN SERPL-MCNC: 13 MG/DL (ref 8–23)
BUN/CREAT SERPL: 12 (ref 7–25)
CALCIUM SERPL-MCNC: 9.9 MG/DL (ref 8.6–10.5)
CHLORIDE SERPL-SCNC: 106 MMOL/L (ref 98–107)
CHOLEST SERPL-MCNC: 134 MG/DL (ref 0–200)
CK SERPL-CCNC: 126 U/L (ref 20–200)
CO2 SERPL-SCNC: 26 MMOL/L (ref 22–29)
CREAT SERPL-MCNC: 1.08 MG/DL (ref 0.76–1.27)
CREAT UR-MCNC: 124.8 MG/DL
EGFRCR SERPLBLD CKD-EPI 2021: 74.7 ML/MIN/1.73
EOSINOPHIL # BLD AUTO: 0.33 10*3/MM3 (ref 0–0.4)
EOSINOPHIL NFR BLD AUTO: 5 % (ref 0.3–6.2)
ERYTHROCYTE [DISTWIDTH] IN BLOOD BY AUTOMATED COUNT: 13.4 % (ref 12.3–15.4)
GLOBULIN SER CALC-MCNC: 2.3 GM/DL
GLUCOSE SERPL-MCNC: 93 MG/DL (ref 65–99)
HBA1C MFR BLD: 6 % (ref 4.8–5.6)
HCT VFR BLD AUTO: 43.7 % (ref 37.5–51)
HDLC SERPL-MCNC: 52 MG/DL (ref 40–60)
HGB BLD-MCNC: 14.3 G/DL (ref 13–17.7)
IMM GRANULOCYTES # BLD AUTO: 0.02 10*3/MM3 (ref 0–0.05)
IMM GRANULOCYTES NFR BLD AUTO: 0.3 % (ref 0–0.5)
LDLC SERPL CALC-MCNC: 64 MG/DL (ref 0–100)
LDLC/HDLC SERPL: 1.2 {RATIO}
LYMPHOCYTES # BLD AUTO: 1.38 10*3/MM3 (ref 0.7–3.1)
LYMPHOCYTES NFR BLD AUTO: 20.8 % (ref 19.6–45.3)
MCH RBC QN AUTO: 29.9 PG (ref 26.6–33)
MCHC RBC AUTO-ENTMCNC: 32.7 G/DL (ref 31.5–35.7)
MCV RBC AUTO: 91.4 FL (ref 79–97)
MICROALBUMIN UR-MCNC: 6.3 UG/ML
MONOCYTES # BLD AUTO: 0.69 10*3/MM3 (ref 0.1–0.9)
MONOCYTES NFR BLD AUTO: 10.4 % (ref 5–12)
NEUTROPHILS # BLD AUTO: 4.13 10*3/MM3 (ref 1.7–7)
NEUTROPHILS NFR BLD AUTO: 62.1 % (ref 42.7–76)
NRBC BLD AUTO-RTO: 0 /100 WBC (ref 0–0.2)
PLATELET # BLD AUTO: 264 10*3/MM3 (ref 140–450)
POTASSIUM SERPL-SCNC: 4.3 MMOL/L (ref 3.5–5.2)
PROT SERPL-MCNC: 6.9 G/DL (ref 6–8.5)
RBC # BLD AUTO: 4.78 10*6/MM3 (ref 4.14–5.8)
SODIUM SERPL-SCNC: 145 MMOL/L (ref 136–145)
TRIGL SERPL-MCNC: 97 MG/DL (ref 0–150)
VLDLC SERPL CALC-MCNC: 18 MG/DL (ref 5–40)
WBC # BLD AUTO: 6.64 10*3/MM3 (ref 3.4–10.8)

## 2023-06-05 RX ORDER — ATORVASTATIN CALCIUM 40 MG/1
TABLET, FILM COATED ORAL
Qty: 30 TABLET | Refills: 0 | Status: SHIPPED | OUTPATIENT
Start: 2023-06-05 | End: 2023-06-07 | Stop reason: SDUPTHER

## 2023-06-05 NOTE — TELEPHONE ENCOUNTER
Rx Refill Note  Requested Prescriptions     Pending Prescriptions Disp Refills    atorvastatin (LIPITOR) 40 MG tablet [Pharmacy Med Name: ATORVASTATIN 40MG TABLETS] 30 tablet 0     Sig: TAKE 1 TABLET BY MOUTH AT BEDTIME      Last office visit with prescribing clinician: 7/11/2022   Next office visit with prescribing clinician: 6/7/2023

## 2023-06-07 ENCOUNTER — OFFICE VISIT (OUTPATIENT)
Dept: FAMILY MEDICINE CLINIC | Facility: CLINIC | Age: 68
End: 2023-06-07
Payer: MEDICARE

## 2023-06-07 VITALS
WEIGHT: 158 LBS | SYSTOLIC BLOOD PRESSURE: 118 MMHG | DIASTOLIC BLOOD PRESSURE: 66 MMHG | TEMPERATURE: 98.1 F | OXYGEN SATURATION: 96 % | HEART RATE: 84 BPM | RESPIRATION RATE: 18 BRPM | BODY MASS INDEX: 21.4 KG/M2 | HEIGHT: 72 IN

## 2023-06-07 DIAGNOSIS — F41.9 ANXIETY: ICD-10-CM

## 2023-06-07 DIAGNOSIS — I25.10 CORONARY ARTERY DISEASE INVOLVING NATIVE CORONARY ARTERY OF NATIVE HEART WITHOUT ANGINA PECTORIS: ICD-10-CM

## 2023-06-07 DIAGNOSIS — N52.9 ERECTILE DYSFUNCTION, UNSPECIFIED ERECTILE DYSFUNCTION TYPE: ICD-10-CM

## 2023-06-07 DIAGNOSIS — I70.0 AORTIC ATHEROSCLEROSIS: ICD-10-CM

## 2023-06-07 DIAGNOSIS — E78.00 PURE HYPERCHOLESTEROLEMIA: Primary | ICD-10-CM

## 2023-06-07 DIAGNOSIS — E11.9 TYPE 2 DIABETES MELLITUS WITHOUT COMPLICATION, WITHOUT LONG-TERM CURRENT USE OF INSULIN: ICD-10-CM

## 2023-06-07 DIAGNOSIS — R35.1 NOCTURIA: ICD-10-CM

## 2023-06-07 RX ORDER — BUPROPION HYDROCHLORIDE 300 MG/1
300 TABLET ORAL
Qty: 90 TABLET | Refills: 3 | Status: SHIPPED | OUTPATIENT
Start: 2023-06-07 | End: 2024-06-06

## 2023-06-07 RX ORDER — ATORVASTATIN CALCIUM 40 MG/1
40 TABLET, FILM COATED ORAL
Qty: 90 TABLET | Refills: 3 | Status: SHIPPED | OUTPATIENT
Start: 2023-06-07 | End: 2024-06-06

## 2023-06-07 RX ORDER — TADALAFIL 20 MG/1
20 TABLET ORAL DAILY PRN
Qty: 30 TABLET | Refills: 1 | Status: SHIPPED | OUTPATIENT
Start: 2023-06-07

## 2023-06-07 RX ORDER — METFORMIN HYDROCHLORIDE 500 MG/1
2000 TABLET, EXTENDED RELEASE ORAL
Qty: 360 TABLET | Refills: 3 | Status: SHIPPED | OUTPATIENT
Start: 2023-06-07 | End: 2024-06-06

## 2023-06-07 NOTE — ASSESSMENT & PLAN NOTE
Condition is stable. The current medical regimen is effective;  continue present plan and medications.  Patient will be labs in 6 months.  Next office visit 1 year

## 2023-06-07 NOTE — PATIENT INSTRUCTIONS
Check on insurance coverage and cost for adacel Tdap(tetanus plus whooping cough vaccine) and get the immunization at your local pharmacy. (Once every 10 Years)     Check on insurance coverage and cost for Shingrix (newest shingles vaccine) and get the immunization at your local pharmacy. It is more effective than the old Zostavax vaccine and is recommended even if you have had the Zostavax vaccine in the past. For more information, please look at the website below:  https://www.cdc.gov/vaccines/vpd/shingles/public/shingrix/index.html

## 2023-06-07 NOTE — PROGRESS NOTES
"Chief Complaint  Diabetes (6 month f/u)    Subjective        HPI   Yaakov presents to [facility] for lab review and to refill current medications. Overall he feels well. No medication side effects are reported.            Objective   Vital Signs:   Vitals:    06/07/23 1448   BP: 118/66   Pulse: 84   Resp: 18   Temp: 98.1 °F (36.7 °C)   SpO2: 96%   Weight: 71.7 kg (158 lb)   Height: 182.9 cm (72\")        [unfilled]      Physical Exam  Vitals reviewed.   Constitutional:       General: He is not in acute distress.  Eyes:      General: Lids are normal.      Conjunctiva/sclera: Conjunctivae normal.   Neck:      Vascular: No carotid bruit.      Trachea: No tracheal deviation.   Cardiovascular:      Rate and Rhythm: Normal rate and regular rhythm.      Heart sounds: Normal heart sounds. No murmur heard.  Pulmonary:      Effort: Pulmonary effort is normal.      Breath sounds: Normal breath sounds.   Skin:     General: Skin is warm and dry.   Neurological:      Mental Status: He is alert. He is not disoriented.   Psychiatric:         Speech: Speech normal.         Behavior: Behavior normal. Behavior is cooperative.        Result Review :     The following data was reviewed by: Dylan Paulson MD on 06/07/2023:  Microalbumin / Creatinine Urine Ratio - Urine, Clean Catch (06/02/2023 08:40)  Hemoglobin A1c (06/02/2023 08:40)  CK (06/02/2023 08:40)  Lipid Panel With LDL/HDL Ratio (06/02/2023 08:40)  Comprehensive Metabolic Panel (06/02/2023 08:40)  CBC & Differential (06/02/2023 08:40)         Assessment and Plan    Diagnoses and all orders for this visit:    1. Pure hypercholesterolemia (Primary)  Assessment & Plan:  Condition is stable. The current medical regimen is effective;  continue present plan and medications.    Orders:  -     atorvastatin (LIPITOR) 40 MG tablet; Take 1 tablet by mouth every night at bedtime.  Dispense: 90 tablet; Refill: 3  -     Comprehensive Metabolic Panel; " Future  -     CBC & Differential; Future  -     Lipid Panel With / Chol / HDL Ratio; Future  -     CK; Future    2. Type 2 diabetes mellitus without complication, without long-term current use of insulin  Assessment & Plan:  Condition is stable. The current medical regimen is effective;  continue present plan and medications.  Patient will be labs in 6 months.  Next office visit 1 year    Orders:  -     metFORMIN ER (GLUCOPHAGE-XR) 500 MG 24 hr tablet; Take 4 tablets by mouth Daily With Dinner.  Dispense: 360 tablet; Refill: 3  -     Hemoglobin A1c; Future  -     MicroAlbumin, Urine, Random - Urine, Clean Catch; Future  -     Comprehensive Metabolic Panel; Future  -     Hemoglobin A1c; Future  -     MicroAlbumin, Urine, Random - Urine, Clean Catch; Future    3. Anxiety  Assessment & Plan:  Condition is stable. The current medical regimen is effective;  continue present plan and medications.    Orders:  -     buPROPion XL (WELLBUTRIN XL) 300 MG 24 hr tablet; Take 1 tablet by mouth every night at bedtime.  Dispense: 90 tablet; Refill: 3    4. Erectile dysfunction, unspecified erectile dysfunction type  Comments:  Condition is stable. The current medical regimen is effective;  continue present plan and medications.  Assessment & Plan:  Condition is stable. The current medical regimen is effective;  continue present plan and medications.    Orders:  -     tadalafil (CIALIS) 20 MG tablet; Take 1 tablet by mouth Daily As Needed for Erectile Dysfunction.  Dispense: 30 tablet; Refill: 1  -     PSA DIAGNOSTIC; Future    5. Aortic atherosclerosis  Assessment & Plan:  Condition is stable. The current medical regimen is effective;  continue present plan and medications.    Orders:  -     Lipid Panel With / Chol / HDL Ratio; Future    6. Coronary artery disease involving native coronary artery of native heart without angina pectoris  Assessment & Plan:  Condition is stable. The current medical regimen is effective;  continue  present plan and medications.    Orders:  -     Lipid Panel With / Chol / HDL Ratio; Future    7. Nocturia  -     PSA DIAGNOSTIC; Future        Follow Up   No follow-ups on file.  Patient was given instructions and counseling regarding his condition or for health maintenance advice. Please see specific information pulled into the AVS if appropriate.

## 2023-10-16 DIAGNOSIS — R13.19 OTHER DYSPHAGIA: ICD-10-CM

## 2023-10-16 RX ORDER — OMEPRAZOLE 40 MG/1
40 CAPSULE, DELAYED RELEASE ORAL DAILY
Qty: 90 CAPSULE | OUTPATIENT
Start: 2023-10-16

## 2023-10-16 RX ORDER — OMEPRAZOLE 40 MG/1
40 CAPSULE, DELAYED RELEASE ORAL DAILY
Qty: 30 CAPSULE | Refills: 0 | Status: SHIPPED | OUTPATIENT
Start: 2023-10-16

## 2023-10-16 NOTE — TELEPHONE ENCOUNTER
Rx Refill Note  Requested Prescriptions     Pending Prescriptions Disp Refills    omeprazole (priLOSEC) 40 MG capsule [Pharmacy Med Name: OMEPRAZOLE 40MG CAPSULES] 90 capsule 3     Sig: TAKE 1 CAPSULE EVERY DAY      Last office visit with prescribing clinician: 6/7/2023   Last telemedicine visit with prescribing clinician: Visit date not found   Next office visit with prescribing clinician: 6/17/2024                         Would you like a call back once the refill request has been completed: [] Yes [] No    If the office needs to give you a call back, can they leave a voicemail: [] Yes [] No    Elise Barajas MA  10/16/23, 08:19 EDT

## 2023-10-16 NOTE — TELEPHONE ENCOUNTER
Rx Refill Note  Requested Prescriptions     Pending Prescriptions Disp Refills    omeprazole (priLOSEC) 40 MG capsule [Pharmacy Med Name: OMEPRAZOLE 40MG CAPSULES] 90 capsule      Sig: TAKE 1 CAPSULE BY MOUTH EVERY DAY      Last office visit with prescribing clinician: 6/7/2023   Last telemedicine visit with prescribing clinician: Visit date not found   Next office visit with prescribing clinician: 6/17/2024                         Would you like a call back once the refill request has been completed: [] Yes [] No    If the office needs to give you a call back, can they leave a voicemail: [] Yes [] No    Elise Barajas MA  10/16/23, 13:05 EDT

## 2023-11-13 DIAGNOSIS — R13.19 OTHER DYSPHAGIA: ICD-10-CM

## 2023-11-13 RX ORDER — OMEPRAZOLE 40 MG/1
40 CAPSULE, DELAYED RELEASE ORAL DAILY
Qty: 14 CAPSULE | Refills: 0 | Status: SHIPPED | OUTPATIENT
Start: 2023-11-13

## 2023-11-13 NOTE — TELEPHONE ENCOUNTER
Rx Refill Note  Requested Prescriptions     Pending Prescriptions Disp Refills    omeprazole (priLOSEC) 40 MG capsule [Pharmacy Med Name: OMEPRAZOLE 40MG CAPSULES] 90 capsule      Sig: TAKE 1 CAPSULE BY MOUTH EVERY DAY      Last office visit with prescribing clinician: 6/7/2023   Last telemedicine visit with prescribing clinician: Visit date not found   Next office visit with prescribing clinician: 6/17/2024                         Would you like a call back once the refill request has been completed: [] Yes [] No    If the office needs to give you a call back, can they leave a voicemail: [] Yes [] No    Elise Barajas MA  11/13/23, 08:31 EST

## 2023-11-27 ENCOUNTER — TELEPHONE (OUTPATIENT)
Dept: GASTROENTEROLOGY | Facility: CLINIC | Age: 68
End: 2023-11-27
Payer: MEDICARE

## 2023-11-27 NOTE — TELEPHONE ENCOUNTER
Called patient and he states he has tried laxatives and suppository and he still has not had a bowel movement in a week.

## 2023-11-27 NOTE — TELEPHONE ENCOUNTER
Call to patient per Dr. Parks's message. Patient verbalized understanding.      Dr. Parks:  Fleets enemas until response is achieved

## 2023-11-27 NOTE — TELEPHONE ENCOUNTER
"  Caller: Dylan Mosher \"Yaakov\"    Relationship: Self    Best call back number: [phone]    What is the best time to reach you: ANYTIME    Who are you requesting to speak with (clinical staff, provider,  specific staff member): CLINICAL STAFF         What was the call regarding: PT HASN'T HAD A BOWEL MOVEMENT IN A WEEK AND IS WANTING MEDICAL ADVICE ON WHAT HE COULD DO TO HELP HIM. PLEASE CALL AND ADVISE       "

## 2023-12-04 ENCOUNTER — TELEPHONE (OUTPATIENT)
Dept: GASTROENTEROLOGY | Facility: CLINIC | Age: 68
End: 2023-12-04
Payer: MEDICARE

## 2023-12-04 NOTE — TELEPHONE ENCOUNTER
"    Hub staff attempted to follow warm transfer process and was unsuccessful     Caller: Dylan Mosher \"Yaakov\"    Relationship to patient: Self    Best call back number: [phone]     Patient is needing:   PATIENT IS HAVING ISSUES WITH CONSTIPATION.  HE CALLED LAST WEEK AND WAS ADVISED TO TRY ENEMAS.  THAT WORKED BUT THEN HE STOPPED RIGHT BACK UP.  PLEASE CALL BACK TO ADVISE.        "

## 2023-12-05 ENCOUNTER — HOSPITAL ENCOUNTER (EMERGENCY)
Facility: HOSPITAL | Age: 68
Discharge: HOME | End: 2023-12-05
Attending: EMERGENCY MEDICINE
Payer: MEDICARE

## 2023-12-05 ENCOUNTER — TELEPHONE (OUTPATIENT)
Dept: GASTROENTEROLOGY | Facility: CLINIC | Age: 68
End: 2023-12-05

## 2023-12-05 VITALS
BODY MASS INDEX: 21.41 KG/M2 | RESPIRATION RATE: 18 BRPM | SYSTOLIC BLOOD PRESSURE: 139 MMHG | WEIGHT: 158.07 LBS | OXYGEN SATURATION: 98 % | HEART RATE: 105 BPM | TEMPERATURE: 96.9 F | DIASTOLIC BLOOD PRESSURE: 89 MMHG | HEIGHT: 72 IN

## 2023-12-05 DIAGNOSIS — K59.00 CONSTIPATION, UNSPECIFIED CONSTIPATION TYPE: Primary | ICD-10-CM

## 2023-12-05 PROCEDURE — 99283 EMERGENCY DEPT VISIT LOW MDM: CPT

## 2023-12-05 RX ORDER — SODIUM CHLORIDE 0.9 % (FLUSH) 0.9 %
10 SYRINGE (ML) INJECTION AS NEEDED
Status: DISCONTINUED | OUTPATIENT
Start: 2023-12-05 | End: 2023-12-05 | Stop reason: HOSPADM

## 2023-12-05 NOTE — ED NOTES
"Pt ambulatory to [facility] via [facility] from home for constipation x 2 weeks. Pt states he has \"tried everything\" and has hx f \"twisted colon\"  "

## 2023-12-05 NOTE — ED PROVIDER NOTES
EMERGENCY DEPARTMENT ENCOUNTER    Room Number:  14/14  PCP: Dylan Paulson MD  Patient Care Team:  Dylan Paulson MD as PCP - General (Family Medicine)  Atilio Cantrell MD as Consulting Physician (Ophthalmology)   Independent Historians: Patient, family    HPI:  Chief Complaint: Constipation    A complete HPI/ROS/PMH/PSH/SH/FH are unobtainable due to: Nothing    Chronic or social conditions impacting patient care (Social Determinants of Health): None  (Financial Resource Strain / Food Insecurity / Transportation Needs / Physical Activity / Stress / Social Connections / Intimate Partner Violence / Housing Stability)    Context: Dylan Mosher is a 68 y.o. male who presents to the ED c/o acute constipation.  The patient reports that he has had constipation for the last 2 weeks.  He reports that he has tried multiple over-the-counter remedies.  He has a call into his gastroenterologist for recommendations but has not heard back.  He states that he has a history of a twisted bowel.  He does report he has been passing gas.  He reports his stools been hard.  He denies abdominal pain.  He denies any vomiting.  He denies any fevers.  He states he has had problems with constipation in the past.    Review of prior external notes (non-ED) -and- Review of prior external test results outside of this encounter: Laboratory evaluation dated 6/2/2023 shows normal CBC, normal CMP, normal CK    Prescription drug monitoring program review:         PAST MEDICAL HISTORY  Active Ambulatory Problems     Diagnosis Date Noted    ED (erectile dysfunction) 03/26/2008    Anxiety 09/20/2012    Colon polyp     Pure hypercholesterolemia 03/23/2011    Sleep apnea 04/29/2008    History of diverticulosis with bleeding 05/18/2016    Type 2 diabetes mellitus without complication, without long-term current use of insulin 01/16/2017    Nocturia 08/23/2018    Coronary artery disease involving native coronary artery of native heart without angina  pectoris 10/09/2019    Aortic atherosclerosis 10/09/2019    Sensory neuropathy of right hand 05/12/2022    Trigger ring finger of left hand 05/12/2022    Other dysphagia 07/11/2022     Resolved Ambulatory Problems     Diagnosis Date Noted    CTS (carpal tunnel syndrome) 03/02/2009    Controlled diabetes mellitus type II without complication 08/04/2014    Type II diabetes mellitus, uncontrolled 03/23/2011    Diverticulitis of colon without hemorrhage     Esophagitis     Fatigue 03/26/2008    Grief reaction 06/14/2014    LFT elevation 01/15/2014    Trapezius strain 12/05/2007    Need for hepatitis C screening test 11/06/2008    Tinnitus 09/12/2013    Trigger middle finger of left hand 12/23/2015    Acute blood loss anemia 09/27/2019    HLD (hyperlipidemia) 09/25/2019    Rectal bleed 09/25/2019    Colitis 09/25/2019    T2DM (type 2 diabetes mellitus) 09/25/2019     Past Medical History:   Diagnosis Date    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    GERD (gastroesophageal reflux disease)     History of stomach ulcers     History of stress test 05/25/2012    PONV (postoperative nausea and vomiting)     Screening for prostate cancer 11/06/2008         PAST SURGICAL HISTORY  Past Surgical History:   Procedure Laterality Date    CHOLECYSTECTOMY  01/22/2013    Dr Newton    COLONOSCOPY      ENDOSCOPY  08/15/2012    esophagitis    ENDOSCOPY N/A 12/6/2022    Procedure: ESOPHAGOGASTRODUODENOSCOPY with biopsies and esophageal dilatation via 56, 60 graham;  Surgeon: Jai Parks MD;  Location: [facility] ENDOSCOPY;  Service: Gastroenterology;  Laterality: N/A;  pre-dysphagia  post-schatzki's ring, irrregular zline    EYE SURGERY  02/08/2018    left eye    UPPER GASTROINTESTINAL ENDOSCOPY      WRIST SURGERY Left 2011    carpal tunnel         FAMILY HISTORY  Family History   Problem Relation Age of Onset    Diabetes Mother         type II    Cirrhosis Father     Alcohol abuse Father     Diabetes Brother         Type II    ADD / ADHD Son     Heart  disease Paternal Grandmother 80    Stroke Paternal Grandmother 80            Malig Hyperthermia Neg Hx          SOCIAL HISTORY  Social History     Socioeconomic History    Marital status:     Number of children: 4   Tobacco Use    Smoking status: Former     Packs/day: 1.50     Years: 20.00     Additional pack years: 0.00     Total pack years: 30.00     Types: Cigarettes     Start date: 1971     Quit date: 1991     Years since quittin.9    Smokeless tobacco: Never   Substance and Sexual Activity    Alcohol use: Not Currently     Comment: current some day; 2/month    Drug use: No    Sexual activity: Not Currently         ALLERGIES  Patient has no known allergies.        REVIEW OF SYSTEMS  Review of Systems  Included in HPI  All systems reviewed and negative except for those discussed in HPI.      PHYSICAL EXAM    I have reviewed the triage vital signs and nursing notes.    ED Triage Vitals   Temp Heart Rate Resp BP SpO2   23 1331 23 1331 23 1331 23 1333 23 1331   96.9 °F (36.1 °C) 105 18 139/89 98 %      Temp src Heart Rate Source Patient Position BP Location FiO2 (%)   -- -- -- 23 1333 --      Right arm        Physical Exam  GENERAL: Awake, alert, no acute distress  SKIN: Warm, dry  HENT: Normocephalic, atraumatic  EYES: no scleral icterus  CV: regular rhythm, regular rate  RESPIRATORY: normal effort, lungs clear  ABDOMEN: soft, nontender, nondistended  MUSCULOSKELETAL: no deformity  NEURO: alert, moves all extremities, follows commands                                                               LAB RESULTS  No results found for this or any previous visit (from the past 24 hour(s)).    I ordered the above labs and independently reviewed the results.        RADIOLOGY  No Radiology Exams Resulted Within Past 24 Hours    I ordered the above noted radiological studies. Reviewed by me and discussed with radiologist.  See dictation for official radiology  interpretation.      PROCEDURES    Procedures      MEDICATIONS GIVEN IN ER    Medications   sodium chloride 0.9 % flush 10 mL (has no administration in time range)         ORDERS PLACED DURING THIS VISIT:  Orders Placed This Encounter   Procedures    Monitor Blood Pressure    Pulse Oximetry, Continuous    Insert Peripheral IV         PROGRESS, DATA ANALYSIS, CONSULTS, AND MEDICAL DECISION MAKING    All labs have been independently interpreted by me.  All radiology studies have been reviewed by me and discussed with radiologist dictating the report.   EKG's independently viewed and interpreted by me.  Discussion below represents my analysis of pertinent findings related to patient's condition, differential diagnosis, treatment plan and final disposition.    Differential diagnosis includes but is not limited to bowel obstruction, constipation, electrolyte disturbance.    Clinical Scores:              ED Course as of 12/05/23 1659   Tue Dec 05, 2023   1536 The patient has received a call from his gastroenterologist, Dr. Parks who has given him directions on medications to take for his constipation.  He no longer wants to be seen in the emergency department.  I offered labs and imaging but the patient declines.  I advised him that he is able to return at any time if symptoms worsen or change.  He is agreeable.  Plan discharge. [TR]      ED Course User Index  [TR] Danny Bruce MD                     AS OF 16:59 EST VITALS:    BP - 139/89  HR - 105  TEMP - 96.9 °F (36.1 °C)  O2 SATS - 98%        DIAGNOSIS  Final diagnoses:   Constipation, unspecified constipation type         DISPOSITION  ED Disposition       ED Disposition   Discharge    Condition   Stable    Comment   --                  Note Disclaimer: At [facility], we believe that sharing information builds trust and better relationships. You are receiving this note because you recently visited [facility]. It is possible you will see health information  before a provider has talked with you about it. This kind of information can be easy to misunderstand. To help you fully understand what it means for your health, we urge you to discuss this note with your provider.         Danny Bruce MD  12/05/23 [id number]

## 2023-12-05 NOTE — TELEPHONE ENCOUNTER
Provider: DR BEAVERS    Caller: SOTO    Relationship to Patient: WIFE    Phone Number: [phone]    Reason for Call: SOTO CALLED IN AND WANTED TO LET DR BEAVERS KNOW THAT SUHAS IS AT THE ER CURRENTLY WAITING TO BE SEEN DUE TO A BOWEL OBSTRUCTION. SHE WANTED TO MAKE SURE DR BEAVERS WAS AWARE.

## 2023-12-05 NOTE — DISCHARGE INSTRUCTIONS
Follows the directions given to you by Dr. Parks.  Return for any severe abdominal pain, uncontrolled nausea or vomiting, or passing out.

## 2023-12-05 NOTE — TELEPHONE ENCOUNTER
Patient called. Advised as per Dr. Parks's note. He states he has been taking Colace and Miralax. He states the last BM he had was last week when he gave himself an enema. Denies passing any stool since the enema(last Wednesday). He reports he is only drinking protein shakes, water and sport drinks.   Patient is currently in the ER for constipation.  Follow up appointment with Anne scheduled 12/12@2260.  Update to Dr. Parks.

## 2023-12-05 NOTE — TELEPHONE ENCOUNTER
Per Dr. Parks:     Colace 100 mg p.o. twice daily, MiraLAX 1 dose in the morning 1 in the evening, office visit extender lulu frederick.

## 2023-12-08 DIAGNOSIS — R13.19 OTHER DYSPHAGIA: ICD-10-CM

## 2023-12-11 RX ORDER — OMEPRAZOLE 40 MG/1
40 CAPSULE, DELAYED RELEASE ORAL DAILY
Qty: 90 CAPSULE | Refills: 0 | Status: SHIPPED | OUTPATIENT
Start: 2023-12-11 | End: 2023-12-12 | Stop reason: SDUPTHER

## 2023-12-11 RX ORDER — OMEPRAZOLE 40 MG/1
40 CAPSULE, DELAYED RELEASE ORAL DAILY
Qty: 90 CAPSULE | Refills: 0 | Status: SHIPPED | OUTPATIENT
Start: 2023-12-11 | End: 2023-12-11 | Stop reason: SDUPTHER

## 2023-12-12 ENCOUNTER — OFFICE VISIT (OUTPATIENT)
Dept: GASTROENTEROLOGY | Facility: CLINIC | Age: 68
End: 2023-12-12
Payer: MEDICARE

## 2023-12-12 VITALS
WEIGHT: 146.3 LBS | HEIGHT: 72 IN | OXYGEN SATURATION: 98 % | BODY MASS INDEX: 19.82 KG/M2 | TEMPERATURE: 96.4 F | HEART RATE: 90 BPM

## 2023-12-12 DIAGNOSIS — K21.00 GASTROESOPHAGEAL REFLUX DISEASE WITH ESOPHAGITIS WITHOUT HEMORRHAGE: Primary | ICD-10-CM

## 2023-12-12 DIAGNOSIS — K59.01 SLOW TRANSIT CONSTIPATION: ICD-10-CM

## 2023-12-12 DIAGNOSIS — K22.70 BARRETT'S ESOPHAGUS WITHOUT DYSPLASIA: ICD-10-CM

## 2023-12-12 RX ORDER — OMEPRAZOLE 40 MG/1
40 CAPSULE, DELAYED RELEASE ORAL DAILY
Qty: 90 CAPSULE | Refills: 3 | Status: SHIPPED | OUTPATIENT
Start: 2023-12-12

## 2023-12-12 NOTE — PROGRESS NOTES
"Chief Complaint  Constipation    Subjective          History Of Present Illness:    Dylan Mosher is a  68 y.o. male patient of Dr. Parks's who was last seen in 2022.  Patient is new to me.  Patient has a history of heartburn and dysphagia.    I reviewed his EGD performed on 12/6/2022 by Dr. Parks.  Patient had a mild Schatzki ring that was dilated and was noted to have an irregular Z-line.  Pathology was consistent with Lo's esophagus out dysplasia.    Patient presented to the hospital on 12/5/23 for constipation. Patient reports he is currently taking colace twice daily and miralax twice daily.  He is having loose small stools but feels like he is not completely evacuating.  Minimal nausea and no vomiting. Patient has been eating less and following a diverticulitis diet in fear that it will cause him constipation.  Denies any melena or hematochezia.  No significant abdominal pain.    Patient reports he is not having any dysphagia or dyspepsia. Taking omeprazole daily.    Additional data reviewed:  C-scope by Dr. Whittington 9/26/19 -normal ileum, colon.  No specimens collected.  Recall 10 years.    Objective   Vital Signs:   Pulse 90   Temp 96.4 °F (35.8 °C)   Ht 182.9 cm (72\")   Wt 66.4 kg (146 lb 4.8 oz)   SpO2 98%   BMI 19.84 kg/m²       Physical Exam  Vitals reviewed.   Constitutional:       General: He is not in acute distress.     Appearance: Normal appearance. He is not ill-appearing.   HENT:      Head: Normocephalic and atraumatic.      Nose: Nose normal.      Mouth/Throat:      Pharynx: Oropharynx is clear.   Eyes:      Extraocular Movements: Extraocular movements intact.      Conjunctiva/sclera: Conjunctivae normal.      Pupils: Pupils are equal, round, and reactive to light.   Pulmonary:      Effort: Pulmonary effort is normal.   Abdominal:      General: Abdomen is flat. There is no distension.      Palpations: Abdomen is soft. There is no mass.      Tenderness: There is no abdominal tenderness. "   Musculoskeletal:         General: No swelling. Normal range of motion.      Cervical back: Normal range of motion.   Skin:     General: Skin is warm and dry.      Findings: No bruising or rash.   Neurological:      General: No focal deficit present.      Mental Status: He is alert and oriented to person, place, and time.      Motor: No weakness.      Gait: Gait normal.   Psychiatric:         Mood and Affect: Mood normal.          Result Review :   The following data was reviewed by: Anne Lamb PA-C on 12/12/2023:  CMP          6/2/2023    08:40   CMP   Glucose 93    BUN 13    Creatinine 1.08    Sodium 145    Potassium 4.3    Chloride 106    Calcium 9.9    Total Protein 6.9    Albumin 4.6    Globulin 2.3    Total Bilirubin 0.5    Alkaline Phosphatase 79    AST (SGOT) 20    ALT (SGPT) 21    BUN/Creatinine Ratio 12.0      CBC          6/2/2023    08:40   CBC   WBC 6.64    RBC 4.78    Hemoglobin 14.3    Hematocrit 43.7    MCV 91.4    MCH 29.9    MCHC 32.7    RDW 13.4    Platelets 264            Assessment and Plan    Diagnoses and all orders for this visit:    1. Gastroesophageal reflux disease with esophagitis without hemorrhage (Primary)  -     omeprazole (priLOSEC) 40 MG capsule; Take 1 capsule by mouth Daily.  Dispense: 90 capsule; Refill: 3    2. Lo's esophagus without dysplasia  -     omeprazole (priLOSEC) 40 MG capsule; Take 1 capsule by mouth Daily.  Dispense: 90 capsule; Refill: 3    3. Slow transit constipation  -     linaclotide (Linzess) 145 MCG capsule capsule; Take 1 capsule by mouth Every Morning Before Breakfast.  Dispense: 30 capsule; Refill: 6       Continue omeprazole 40 mg daily with history of GERD and Lo's esophagus  Start Linzess 145 mcg daily with ongoing constipation.  He can continue to take MiraLAX as he gets started on this.  We did discuss that he may have some diarrhea and abdominal cramping the first couple weeks of Linzess.  Patient to update me via [employer] if  Linzess is efficacious for him.    Follow Up   Return in about 3 months (around 3/12/2024) for Anne Puckett PA-C.    Dragon dictation used throughout this note.            Anne Puckett PA-C   [facility]  [address] [place] [address]  Office: [phone]

## 2024-03-19 ENCOUNTER — TELEPHONE (OUTPATIENT)
Dept: GASTROENTEROLOGY | Facility: CLINIC | Age: 69
End: 2024-03-19
Payer: MEDICARE

## 2024-03-19 ENCOUNTER — OFFICE VISIT (OUTPATIENT)
Dept: GASTROENTEROLOGY | Facility: CLINIC | Age: 69
End: 2024-03-19
Payer: MEDICARE

## 2024-03-19 VITALS
HEIGHT: 72 IN | WEIGHT: 155 LBS | TEMPERATURE: 98.6 F | HEART RATE: 99 BPM | BODY MASS INDEX: 20.99 KG/M2 | OXYGEN SATURATION: 98 %

## 2024-03-19 DIAGNOSIS — K22.70 BARRETT'S ESOPHAGUS WITHOUT DYSPLASIA: ICD-10-CM

## 2024-03-19 DIAGNOSIS — K59.01 SLOW TRANSIT CONSTIPATION: Primary | ICD-10-CM

## 2024-03-19 DIAGNOSIS — K21.00 GASTROESOPHAGEAL REFLUX DISEASE WITH ESOPHAGITIS WITHOUT HEMORRHAGE: ICD-10-CM

## 2024-03-19 DIAGNOSIS — R13.19 ESOPHAGEAL DYSPHAGIA: ICD-10-CM

## 2024-03-19 PROCEDURE — 99214 OFFICE O/P EST MOD 30 MIN: CPT | Performed by: PHYSICIAN ASSISTANT

## 2024-03-19 PROCEDURE — 1160F RVW MEDS BY RX/DR IN RCRD: CPT | Performed by: PHYSICIAN ASSISTANT

## 2024-03-19 PROCEDURE — 1159F MED LIST DOCD IN RCRD: CPT | Performed by: PHYSICIAN ASSISTANT

## 2024-03-19 NOTE — PROGRESS NOTES
"Chief Complaint  Heartburn    Subjective          History Of Present Illness:    Dylan Mosher is a  68 y.o. male patient of Dr. Parks who presents as a follow-up for GERD, Lo's esophagus, constipation, dysphagia.     Patient reports the Linzess works well but sometimes causes him to have urgency with loose stools after he takes it in the mornings. He denies any excess diarrhea. He denies abdominal pain, melena, or hematochezia. Patient does continue to endorse dysphagia, primarily with solid foods and pills. No vomiting or nausea. He continues to take his omeprazole as directed. Appetite is good and weight is stable.     Additional data reviewed:  C-scope by Dr. Whittington 9/26/19 -normal ileum, colon.  No specimens collected.  Recall 10 years.  EGD 12/6/2022 by Dr. Parks. Patient had a mild Schatzki ring that was dilated and was noted to have an irregular Z-line. Pathology was consistent with Lo's esophagus out dysplasia.     Objective   Vital Signs:   Pulse 99   Temp 98.6 °F (37 °C)   Ht 182.9 cm (72\")   Wt 70.3 kg (155 lb)   SpO2 98%   BMI 21.02 kg/m²       Physical Exam  Vitals reviewed.   Constitutional:       General: He is not in acute distress.     Appearance: Normal appearance. He is not ill-appearing.   HENT:      Head: Normocephalic and atraumatic.      Nose: Nose normal.      Mouth/Throat:      Pharynx: Oropharynx is clear.   Eyes:      Extraocular Movements: Extraocular movements intact.      Conjunctiva/sclera: Conjunctivae normal.      Pupils: Pupils are equal, round, and reactive to light.   Pulmonary:      Effort: Pulmonary effort is normal.   Abdominal:      General: There is no distension.      Palpations: Abdomen is soft. There is no mass.      Tenderness: There is abdominal tenderness in the periumbilical area.   Musculoskeletal:         General: No swelling. Normal range of motion.      Cervical back: Normal range of motion.   Skin:     General: Skin is warm and dry.      " Findings: No bruising or rash.   Neurological:      General: No focal deficit present.      Mental Status: He is alert and oriented to person, place, and time.      Motor: No weakness.      Gait: Gait normal.   Psychiatric:         Mood and Affect: Mood normal.          Result Review :   The following data was reviewed by: Anne aLmb PA-C on 03/19/2024:  CMP          6/2/2023    08:40   CMP   Glucose 93    BUN 13    Creatinine 1.08    Sodium 145    Potassium 4.3    Chloride 106    Calcium 9.9    Total Protein 6.9    Albumin 4.6    Globulin 2.3    Total Bilirubin 0.5    Alkaline Phosphatase 79    AST (SGOT) 20    ALT (SGPT) 21    BUN/Creatinine Ratio 12.0      CBC          6/2/2023    08:40   CBC   WBC 6.64    RBC 4.78    Hemoglobin 14.3    Hematocrit 43.7    MCV 91.4    MCH 29.9    MCHC 32.7    RDW 13.4    Platelets 264            Assessment and Plan    Diagnoses and all orders for this visit:    1. Slow transit constipation (Primary)  -     linaclotide (Linzess) 72 MCG capsule capsule; Take 1 capsule by mouth Every Morning Before Breakfast.  Dispense: 30 capsule; Refill: 11    2. Esophageal dysphagia  -     Case Request; Standing  -     Implement Anesthesia orders day of procedure.; Standing  -     linaclotide (Linzess) 72 MCG capsule capsule; Take 1 capsule by mouth Every Morning Before Breakfast.  Dispense: 30 capsule; Refill: 11  -     Case Request    3. Lo's esophagus without dysplasia  -     Case Request; Standing  -     Implement Anesthesia orders day of procedure.; Standing  -     linaclotide (Linzess) 72 MCG capsule capsule; Take 1 capsule by mouth Every Morning Before Breakfast.  Dispense: 30 capsule; Refill: 11  -     Case Request    4. Gastroesophageal reflux disease with esophagitis without hemorrhage  -     Case Request; Standing  -     Implement Anesthesia orders day of procedure.; Standing  -     linaclotide (Linzess) 72 MCG capsule capsule; Take 1 capsule by mouth Every Morning  Before Breakfast.  Dispense: 30 capsule; Refill: 11  -     Case Request       Reduce Linzess to 72 mcg daily - can consider every other day dosing if stools are too loose.   Patient with ongoing solid food and pill dysphagia. Recommend proceeding with repeat EGD for further evaluation. Patient does have a history of a schazkis ring.   Continue 40 mg daily    Follow Up   Return for EGD.    Dragon dictation used throughout this note.            Anne Puckett PA-C   [facility]  [address] [place] [address]  Office: [phone]

## 2024-03-19 NOTE — H&P (VIEW-ONLY)
"Chief Complaint  Heartburn    Subjective          History Of Present Illness:    Dylan Mosher is a  68 y.o. male patient of Dr. Parks who presents as a follow-up for GERD, Lo's esophagus, constipation, dysphagia.     Patient reports the Linzess works well but sometimes causes him to have urgency with loose stools after he takes it in the mornings. He denies any excess diarrhea. He denies abdominal pain, melena, or hematochezia. Patient does continue to endorse dysphagia, primarily with solid foods and pills. No vomiting or nausea. He continues to take his omeprazole as directed. Appetite is good and weight is stable.     Additional data reviewed:  C-scope by Dr. Whittington 9/26/19 -normal ileum, colon.  No specimens collected.  Recall 10 years.  EGD 12/6/2022 by Dr. Parks. Patient had a mild Schatzki ring that was dilated and was noted to have an irregular Z-line. Pathology was consistent with Lo's esophagus out dysplasia.     Objective   Vital Signs:   Pulse 99   Temp 98.6 °F (37 °C)   Ht 182.9 cm (72\")   Wt 70.3 kg (155 lb)   SpO2 98%   BMI 21.02 kg/m²       Physical Exam  Vitals reviewed.   Constitutional:       General: He is not in acute distress.     Appearance: Normal appearance. He is not ill-appearing.   HENT:      Head: Normocephalic and atraumatic.      Nose: Nose normal.      Mouth/Throat:      Pharynx: Oropharynx is clear.   Eyes:      Extraocular Movements: Extraocular movements intact.      Conjunctiva/sclera: Conjunctivae normal.      Pupils: Pupils are equal, round, and reactive to light.   Pulmonary:      Effort: Pulmonary effort is normal.   Abdominal:      General: There is no distension.      Palpations: Abdomen is soft. There is no mass.      Tenderness: There is abdominal tenderness in the periumbilical area.   Musculoskeletal:         General: No swelling. Normal range of motion.      Cervical back: Normal range of motion.   Skin:     General: Skin is warm and dry.      " Findings: No bruising or rash.   Neurological:      General: No focal deficit present.      Mental Status: He is alert and oriented to person, place, and time.      Motor: No weakness.      Gait: Gait normal.   Psychiatric:         Mood and Affect: Mood normal.          Result Review :   The following data was reviewed by: Anne Lamb PA-C on 03/19/2024:  CMP          6/2/2023    08:40   CMP   Glucose 93    BUN 13    Creatinine 1.08    Sodium 145    Potassium 4.3    Chloride 106    Calcium 9.9    Total Protein 6.9    Albumin 4.6    Globulin 2.3    Total Bilirubin 0.5    Alkaline Phosphatase 79    AST (SGOT) 20    ALT (SGPT) 21    BUN/Creatinine Ratio 12.0      CBC          6/2/2023    08:40   CBC   WBC 6.64    RBC 4.78    Hemoglobin 14.3    Hematocrit 43.7    MCV 91.4    MCH 29.9    MCHC 32.7    RDW 13.4    Platelets 264            Assessment and Plan    Diagnoses and all orders for this visit:    1. Slow transit constipation (Primary)  -     linaclotide (Linzess) 72 MCG capsule capsule; Take 1 capsule by mouth Every Morning Before Breakfast.  Dispense: 30 capsule; Refill: 11    2. Esophageal dysphagia  -     Case Request; Standing  -     Implement Anesthesia orders day of procedure.; Standing  -     linaclotide (Linzess) 72 MCG capsule capsule; Take 1 capsule by mouth Every Morning Before Breakfast.  Dispense: 30 capsule; Refill: 11  -     Case Request    3. Lo's esophagus without dysplasia  -     Case Request; Standing  -     Implement Anesthesia orders day of procedure.; Standing  -     linaclotide (Linzess) 72 MCG capsule capsule; Take 1 capsule by mouth Every Morning Before Breakfast.  Dispense: 30 capsule; Refill: 11  -     Case Request    4. Gastroesophageal reflux disease with esophagitis without hemorrhage  -     Case Request; Standing  -     Implement Anesthesia orders day of procedure.; Standing  -     linaclotide (Linzess) 72 MCG capsule capsule; Take 1 capsule by mouth Every Morning  Before Breakfast.  Dispense: 30 capsule; Refill: 11  -     Case Request       Reduce Linzess to 72 mcg daily - can consider every other day dosing if stools are too loose.   Patient with ongoing solid food and pill dysphagia. Recommend proceeding with repeat EGD for further evaluation. Patient does have a history of a schazkis ring.   Continue 40 mg daily    Follow Up   Return for EGD.    Dragon dictation used throughout this note.            Anne Puckett PA-C   [facility]  [address] [place] [address]  Office: [phone]

## 2024-03-27 ENCOUNTER — TELEPHONE (OUTPATIENT)
Dept: FAMILY MEDICINE CLINIC | Facility: CLINIC | Age: 69
End: 2024-03-27
Payer: MEDICARE

## 2024-03-27 NOTE — TELEPHONE ENCOUNTER
"    Caller: Dylan Mosher \"Yaakov\"    Relationship: Self    Best call back number: [phone]    What is the medical concern/diagnosis: LOSING TOE NAILS, BLEEDS WHEN STUBBING TOES    What specialty or service is being requested: DIABETIC PODIATRY    What is the provider, practice or medical service name:   [facility]    Any additional details:     PLEASE CALL TO CONFIRM OR DISCUSS      "

## 2024-03-28 NOTE — TELEPHONE ENCOUNTER
Called pt left detailed vm stating he needs appt before referral per Dr. Paulson. Office number provided for call back.

## 2024-04-08 ENCOUNTER — ANESTHESIA (OUTPATIENT)
Dept: SURGERY | Facility: SURGERY CENTER | Age: 69
End: 2024-04-08
Payer: MEDICARE

## 2024-04-08 ENCOUNTER — HOSPITAL ENCOUNTER (OUTPATIENT)
Facility: SURGERY CENTER | Age: 69
Setting detail: HOSPITAL OUTPATIENT SURGERY
Discharge: HOME | End: 2024-04-08
Attending: INTERNAL MEDICINE | Admitting: INTERNAL MEDICINE
Payer: MEDICARE

## 2024-04-08 ENCOUNTER — ANESTHESIA EVENT (OUTPATIENT)
Dept: SURGERY | Facility: SURGERY CENTER | Age: 69
End: 2024-04-08
Payer: MEDICARE

## 2024-04-08 VITALS
DIASTOLIC BLOOD PRESSURE: 79 MMHG | WEIGHT: 151.8 LBS | HEART RATE: 68 BPM | RESPIRATION RATE: 16 BRPM | TEMPERATURE: 97 F | SYSTOLIC BLOOD PRESSURE: 120 MMHG | BODY MASS INDEX: 20.59 KG/M2 | OXYGEN SATURATION: 98 %

## 2024-04-08 DIAGNOSIS — K21.00 GASTROESOPHAGEAL REFLUX DISEASE WITH ESOPHAGITIS WITHOUT HEMORRHAGE: ICD-10-CM

## 2024-04-08 DIAGNOSIS — K22.70 BARRETT'S ESOPHAGUS WITHOUT DYSPLASIA: ICD-10-CM

## 2024-04-08 DIAGNOSIS — R13.19 ESOPHAGEAL DYSPHAGIA: ICD-10-CM

## 2024-04-08 DIAGNOSIS — R13.19 OTHER DYSPHAGIA: ICD-10-CM

## 2024-04-08 LAB — GLUCOSE BLDC GLUCOMTR-MCNC: 119 MG/DL (ref 70–130)

## 2024-04-08 PROCEDURE — 25010000002 PROPOFOL 10 MG/ML EMULSION: Performed by: ANESTHESIOLOGY

## 2024-04-08 PROCEDURE — 88305 TISSUE EXAM BY PATHOLOGIST: CPT | Performed by: INTERNAL MEDICINE

## 2024-04-08 PROCEDURE — 43249 ESOPH EGD DILATION <30 MM: CPT | Performed by: INTERNAL MEDICINE

## 2024-04-08 PROCEDURE — 43239 EGD BIOPSY SINGLE/MULTIPLE: CPT | Performed by: INTERNAL MEDICINE

## 2024-04-08 PROCEDURE — 25010000002 PROPOFOL 1000 MG/100ML EMULSION: Performed by: ANESTHESIOLOGY

## 2024-04-08 PROCEDURE — 25810000003 LACTATED RINGERS PER 1000 ML: Performed by: INTERNAL MEDICINE

## 2024-04-08 PROCEDURE — C1726 CATH, BAL DIL, NON-VASCULAR: HCPCS | Performed by: INTERNAL MEDICINE

## 2024-04-08 RX ORDER — MAGNESIUM HYDROXIDE 1200 MG/15ML
LIQUID ORAL AS NEEDED
Status: DISCONTINUED | OUTPATIENT
Start: 2024-04-08 | End: 2024-04-08 | Stop reason: HOSPADM

## 2024-04-08 RX ORDER — SODIUM CHLORIDE, SODIUM LACTATE, POTASSIUM CHLORIDE, CALCIUM CHLORIDE 600; 310; 30; 20 MG/100ML; MG/100ML; MG/100ML; MG/100ML
1000 INJECTION, SOLUTION INTRAVENOUS CONTINUOUS
Status: DISCONTINUED | OUTPATIENT
Start: 2024-04-08 | End: 2024-04-08 | Stop reason: HOSPADM

## 2024-04-08 RX ORDER — PROPOFOL 10 MG/ML
VIAL (ML) INTRAVENOUS AS NEEDED
Status: DISCONTINUED | OUTPATIENT
Start: 2024-04-08 | End: 2024-04-08 | Stop reason: SURG

## 2024-04-08 RX ORDER — LIDOCAINE HYDROCHLORIDE 20 MG/ML
INJECTION, SOLUTION INFILTRATION; PERINEURAL AS NEEDED
Status: DISCONTINUED | OUTPATIENT
Start: 2024-04-08 | End: 2024-04-08 | Stop reason: SURG

## 2024-04-08 RX ORDER — LIDOCAINE HYDROCHLORIDE 10 MG/ML
0.5 INJECTION, SOLUTION INFILTRATION; PERINEURAL ONCE AS NEEDED
Status: DISCONTINUED | OUTPATIENT
Start: 2024-04-08 | End: 2024-04-08 | Stop reason: HOSPADM

## 2024-04-08 RX ORDER — ONDANSETRON 2 MG/ML
4 INJECTION INTRAMUSCULAR; INTRAVENOUS ONCE AS NEEDED
Status: DISCONTINUED | OUTPATIENT
Start: 2024-04-08 | End: 2024-04-08 | Stop reason: HOSPADM

## 2024-04-08 RX ORDER — SODIUM CHLORIDE 0.9 % (FLUSH) 0.9 %
10 SYRINGE (ML) INJECTION AS NEEDED
Status: DISCONTINUED | OUTPATIENT
Start: 2024-04-08 | End: 2024-04-08 | Stop reason: HOSPADM

## 2024-04-08 RX ORDER — PROPOFOL 10 MG/ML
INJECTION, EMULSION INTRAVENOUS CONTINUOUS PRN
Status: DISCONTINUED | OUTPATIENT
Start: 2024-04-08 | End: 2024-04-08 | Stop reason: SURG

## 2024-04-08 RX ADMIN — SODIUM CHLORIDE, POTASSIUM CHLORIDE, SODIUM LACTATE AND CALCIUM CHLORIDE: 600; 310; 30; 20 INJECTION, SOLUTION INTRAVENOUS at 12:05

## 2024-04-08 RX ADMIN — PROPOFOL 120 MG: 10 INJECTION, EMULSION INTRAVENOUS at 12:09

## 2024-04-08 RX ADMIN — PROPOFOL 250 MCG/KG/MIN: 10 INJECTION, EMULSION INTRAVENOUS at 12:09

## 2024-04-08 RX ADMIN — LIDOCAINE HYDROCHLORIDE 50 MG: 20 INJECTION, SOLUTION INFILTRATION; PERINEURAL at 12:09

## 2024-04-08 NOTE — ANESTHESIA PREPROCEDURE EVALUATION
Anesthesia Evaluation     Patient summary reviewed   history of anesthetic complications:  PONV               Airway   Mallampati: II  Dental - normal exam     Comment: Risk of dental injury discussed with patient    Pulmonary - normal exam   (+) ,sleep apnea  Cardiovascular - normal exam    (+) hyperlipidemia  (-) hypertension      Neuro/Psych  (+) numbness, psychiatric history Anxiety  GI/Hepatic/Renal/Endo    (+) GERD, diabetes mellitus type 2    Musculoskeletal     Abdominal    Substance History      OB/GYN          Other                        Anesthesia Plan    ASA 2     MAC       Anesthetic plan, risks, benefits, and alternatives have been provided, discussed and informed consent has been obtained with: patient.    CODE STATUS:

## 2024-04-08 NOTE — ANESTHESIA POSTPROCEDURE EVALUATION
Patient: Dylan Mosher    Procedure Summary       Date: 04/08/24 Room / Location: [facility] / [facility]    Anesthesia Start: 1205 Anesthesia Stop: 1221    Procedure: ESOPHAGOGASTRODUODENOSCOPY WITH BIOPSY AND DILATION (Esophagus) Diagnosis:       Other dysphagia      Lo's esophagus without dysplasia      Gastroesophageal reflux disease with esophagitis without hemorrhage      (Other dysphagia [R13.19])      (Lo's esophagus without dysplasia [K22.70])      (Gastroesophageal reflux disease with esophagitis without hemorrhage [K21.00])    Surgeons: Jai Parks MD Provider: Terra Benavidez MD    Anesthesia Type: MAC ASA Status: 2            Anesthesia Type: MAC    Vitals  Vitals Value Taken Time   /79 04/08/24 1235   Temp     Pulse 68 04/08/24 1235   Resp 16 04/08/24 1235   SpO2 98 % 04/08/24 1235           Post Anesthesia Care and Evaluation    Patient location during evaluation: PHASE II  Patient participation: complete - patient participated  Level of consciousness: awake  Pain management: adequate    Airway patency: patent  Anesthetic complications: No anesthetic complications    Cardiovascular status: acceptable  Respiratory status: acceptable  Hydration status: acceptable    Comments: /79   Pulse 68   Temp 36.1 °C (97 °F) (Temporal)   Resp 16   Wt 68.9 kg (151 lb 12.8 oz)   SpO2 98%   BMI 20.59 kg/m²

## 2024-04-10 ENCOUNTER — TELEPHONE (OUTPATIENT)
Dept: GASTROENTEROLOGY | Facility: CLINIC | Age: 69
End: 2024-04-10
Payer: MEDICARE

## 2024-04-10 NOTE — TELEPHONE ENCOUNTER
Call to patient per Dr. Parks's results and recommendations.   Patient verbalized understanding     Follow up appointment scheduled     ----- Message from Jai Parks MD sent at 4/9/2024 11:19 AM EDT -----  Lo's esophagus no dysplasia  EGD recall 5 years  Office visit [facility] 3 months to discuss

## 2024-06-17 ENCOUNTER — OFFICE VISIT (OUTPATIENT)
Dept: FAMILY MEDICINE CLINIC | Facility: CLINIC | Age: 69
End: 2024-06-17
Payer: MEDICARE

## 2024-06-17 VITALS
HEIGHT: 72 IN | DIASTOLIC BLOOD PRESSURE: 62 MMHG | OXYGEN SATURATION: 99 % | BODY MASS INDEX: 20.86 KG/M2 | WEIGHT: 154 LBS | SYSTOLIC BLOOD PRESSURE: 100 MMHG | HEART RATE: 80 BPM

## 2024-06-17 DIAGNOSIS — E78.00 PURE HYPERCHOLESTEROLEMIA: ICD-10-CM

## 2024-06-17 DIAGNOSIS — F41.9 ANXIETY: ICD-10-CM

## 2024-06-17 DIAGNOSIS — E11.9 TYPE 2 DIABETES MELLITUS WITHOUT COMPLICATION, WITHOUT LONG-TERM CURRENT USE OF INSULIN: ICD-10-CM

## 2024-06-17 DIAGNOSIS — Z00.00 MEDICARE ANNUAL WELLNESS VISIT, SUBSEQUENT: Primary | ICD-10-CM

## 2024-06-17 DIAGNOSIS — R41.3 MEMORY DIFFICULTIES: ICD-10-CM

## 2024-06-17 DIAGNOSIS — N52.9 ERECTILE DYSFUNCTION, UNSPECIFIED ERECTILE DYSFUNCTION TYPE: ICD-10-CM

## 2024-06-17 PROCEDURE — 1170F FXNL STATUS ASSESSED: CPT | Performed by: FAMILY MEDICINE

## 2024-06-17 PROCEDURE — 3044F HG A1C LEVEL LT 7.0%: CPT | Performed by: FAMILY MEDICINE

## 2024-06-17 PROCEDURE — G0439 PPPS, SUBSEQ VISIT: HCPCS | Performed by: FAMILY MEDICINE

## 2024-06-17 PROCEDURE — 99214 OFFICE O/P EST MOD 30 MIN: CPT | Performed by: FAMILY MEDICINE

## 2024-06-17 PROCEDURE — 1126F AMNT PAIN NOTED NONE PRSNT: CPT | Performed by: FAMILY MEDICINE

## 2024-06-17 RX ORDER — BUPROPION HYDROCHLORIDE 300 MG/1
300 TABLET ORAL
Qty: 90 TABLET | Refills: 2 | Status: SHIPPED | OUTPATIENT
Start: 2024-06-17 | End: 2025-03-14

## 2024-06-17 RX ORDER — ATORVASTATIN CALCIUM 40 MG/1
40 TABLET, FILM COATED ORAL
Qty: 90 TABLET | Refills: 2 | Status: SHIPPED | OUTPATIENT
Start: 2024-06-17 | End: 2025-03-14

## 2024-06-17 RX ORDER — TADALAFIL 20 MG/1
20 TABLET ORAL DAILY PRN
Qty: 30 TABLET | Refills: 1 | Status: SHIPPED | OUTPATIENT
Start: 2024-06-17

## 2024-06-17 RX ORDER — METFORMIN HYDROCHLORIDE 500 MG/1
2000 TABLET, EXTENDED RELEASE ORAL
Qty: 360 TABLET | Refills: 2 | Status: SHIPPED | OUTPATIENT
Start: 2024-06-17 | End: 2025-03-14

## 2024-06-17 NOTE — PATIENT INSTRUCTIONS
Carbohydrate Counting for Diabetes Mellitus, Adult  Carbohydrate counting is a method of keeping track of how many carbohydrates you eat. Eating carbohydrates increases the amount of sugar (glucose) in the blood. Counting how many carbohydrates you eat improves how well you manage your blood glucose. This, in turn, helps you manage your diabetes.  Carbohydrates are measured in grams (g) per serving. It is important to know how many carbohydrates (in grams or by serving size) you can have in each meal. This is different for every person. A dietitian can help you make a meal plan and calculate how many carbohydrates you should have at each meal and snack.  What foods contain carbohydrates?  Carbohydrates are found in the following foods:  Grains, such as breads and cereals.  Dried beans and soy products.  Starchy vegetables, such as potatoes, peas, and corn.  Fruit and fruit juices.  Milk and yogurt.  Sweets and snack foods, such as cake, cookies, candy, chips, and soft drinks.  How do I count carbohydrates in foods?  There are two ways to count carbohydrates in food. You can read food labels or learn standard serving sizes of foods. You can use either of these methods or a combination of both.  Using the Nutrition Facts label  The Nutrition Facts list is included on the labels of almost all packaged foods and beverages in the [place]. It includes:  The serving size.  Information about nutrients in each serving, including the grams of carbohydrate per serving.  To use the Nutrition Facts, decide how many servings you will have. Then, multiply the number of servings by the number of carbohydrates per serving. The resulting number is the total grams of carbohydrates that you will be having.  Learning the standard serving sizes of foods  When you eat carbohydrate foods that are not packaged or do not include Nutrition Facts on the label, you need to measure the servings in order to count the grams of  carbohydrates.  Measure the foods that you will eat with a food scale or measuring cup, if needed.  Decide how many standard-size servings you will eat.  Multiply the number of servings by 15. For foods that contain carbohydrates, one serving equals 15 g of carbohydrates.  For example, if you eat 2 cups or 10 oz (300 g) of strawberries, you will have eaten 2 servings and 30 g of carbohydrates (2 servings x 15 g = 30 g).  For foods that have more than one food mixed, such as soups and casseroles, you must count the carbohydrates in each food that is included.  The following list contains standard serving sizes of common carbohydrate-rich foods. Each of these servings has about 15 g of carbohydrates:  1 slice of bread.  1 six-inch (15 cm) tortilla.  ? cup or 2 oz (53 g) cooked rice or pasta.  ½ cup or 3 oz (85 g) cooked or canned, drained and rinsed beans or lentils.  ½ cup or 3 oz (85 g) starchy vegetable, such as peas, corn, or squash.  ½ cup or 4 oz (120 g) hot cereal.  ½ cup or 3 oz (85 g) boiled or mashed potatoes, or ¼ or 3 oz (85 g) of a large baked potato.  ½ cup or 4 fl oz (118 mL) fruit juice.  1 cup or 8 fl oz (237 mL) milk.  1 small or 4 oz (106 g) apple.  ½ or 2 oz (63 g) of a medium banana.  1 cup or 5 oz (150 g) strawberries.  3 cups or 1 oz (28.3 g) popped popcorn.  What is an example of carbohydrate counting?  To calculate the grams of carbohydrates in this sample meal, follow the steps shown below.  Sample meal  3 oz (85 g) chicken breast.  ? cup or 4 oz (106 g) brown rice.  ½ cup or 3 oz (85 g) corn.  1 cup or 8 fl oz (237 mL) milk.  1 cup or 5 oz (150 g) strawberries with sugar-free whipped topping.  Carbohydrate calculation  Identify the foods that contain carbohydrates:  Rice.  Corn.  Milk.  Strawberries.  Calculate how many servings you have of each food:  2 servings rice.  1 serving corn.  1 serving milk.  1 serving strawberries.  Multiply each number of servings by 15  servings rice x 15  g = 30 g.  1 serving corn x 15 g = 15 g.  1 serving milk x 15 g = 15 g.  1 serving strawberries x 15 g = 15 g.  Add together all of the amounts to find the total grams of carbohydrates eaten:  30 g + 15 g + 15 g + 15 g = 75 g of carbohydrates total.  What are tips for following this plan?  Shopping  Develop a meal plan and then make a shopping list.  Buy fresh and frozen vegetables, fresh and frozen fruit, dairy, eggs, beans, lentils, and whole grains.  Look at food labels. Choose foods that have more fiber and less sugar.  Avoid processed foods and foods with added sugars.  Meal planning  Aim to have the same number of grams of carbohydrates at each meal and for each snack time.  Plan to have regular, balanced meals and snacks.  Where to find more information  American Diabetes Association: diabetes.org  Centers for Disease Control and Prevention: cdc.gov  Academy of Nutrition and Dietetics: eatright.org  Association of Diabetes Care & Education Specialists: diabeteseducator.org  Summary  Carbohydrate counting is a method of keeping track of how many carbohydrates you eat.  Eating carbohydrates increases the amount of sugar (glucose) in your blood.  Counting how many carbohydrates you eat improves how well you manage your blood glucose. This helps you manage your diabetes.  A dietitian can help you make a meal plan and calculate how many carbohydrates you should have at each meal and snack.  This information is not intended to replace advice given to you by your health care provider. Make sure you discuss any questions you have with your health care provider.  Document Revised: 07/21/2021 Document Reviewed: 07/21/2021  [employer] Patient Education © 2024 [employer]  Exercising to Stay Healthy  To become healthy and stay healthy, it is recommended that you do moderate-intensity and vigorous-intensity exercise. You can tell that you are exercising at a moderate intensity if your heart starts beating faster and you  start breathing faster but can still hold a conversation. You can tell that you are exercising at a vigorous intensity if you are breathing much harder and faster and cannot hold a conversation while exercising.  How can exercise benefit me?  Exercising regularly is important. It has many health benefits, such as:  Improving overall fitness, flexibility, and endurance.  Increasing bone density.  Helping with weight control.  Decreasing body fat.  Increasing muscle strength and endurance.  Reducing stress and tension, anxiety, depression, or anger.  Improving overall health.  What guidelines should I follow while exercising?  Before you start a new exercise program, talk with your health care provider.  Do not exercise so much that you hurt yourself, feel dizzy, or get very short of breath.  Wear comfortable clothes and wear shoes with good support.  Drink plenty of water while you exercise to prevent dehydration or heat stroke.  Work out until your breathing and your heartbeat get faster (moderate intensity).  How often should I exercise?  Choose an activity that you enjoy, and set realistic goals. Your health care provider can help you make an activity plan that is individually designed and works best for you.  Exercise regularly as told by your health care provider. This may include:  Doing strength training two times a week, such as:  Lifting weights.  Using resistance bands.  Push-ups.  Sit-ups.  Yoga.  Doing a certain intensity of exercise for a given amount of time. Choose from these options:  A total of 150 minutes of moderate-intensity exercise every week.  A total of 75 minutes of vigorous-intensity exercise every week.  A mix of moderate-intensity and vigorous-intensity exercise every week.  Children, pregnant women, people who have not exercised regularly, people who are overweight, and older adults may need to talk with a health care provider about what activities are safe to perform. If you have a  medical condition, be sure to talk with your health care provider before you start a new exercise program.  What are some exercise ideas?  Moderate-intensity exercise ideas include:  Walking 1 mile (1.6 km) in about 15 minutes.  Biking.  Hiking.  Golfing.  Dancing.  Water aerobics.  Vigorous-intensity exercise ideas include:  Walking 4.5 miles (7.2 km) or more in about 1 hour.  Jogging or running 5 miles (8 km) in about 1 hour.  Biking 10 miles (16.1 km) or more in about 1 hour.  Lap swimming.  Roller-skating or in-line skating.  Cross-country skiing.  Vigorous competitive sports, such as football, basketball, and soccer.  Jumping rope.  Aerobic dancing.  What are some everyday activities that can help me get exercise?  Yard work, such as:  Pushing a .  Raking and bagging leaves.  Washing your car.  Pushing a stroller.  Shoveling snow.  Gardening.  Washing windows or floors.  How can I be more active in my day-to-day activities?  Use stairs instead of an elevator.  Take a walk during your lunch break.  If you drive, park your car farther away from your work or school.  If you take public transportation, get off one stop early and walk the rest of the way.  Stand up or walk around during all of your indoor phone calls.  Get up, stretch, and walk around every 30 minutes throughout the day.  Enjoy exercise with a friend. Support to continue exercising will help you keep a regular routine of activity.  Where to find more information  You can find more information about exercising to stay healthy from:  [place] Department of Health and Human Services: www.hhs.gov  Centers for Disease Control and Prevention (CDC): www.cdc.gov  Summary  Exercising regularly is important. It will improve your overall fitness, flexibility, and endurance.  Regular exercise will also improve your overall health. It can help you control your weight, reduce stress, and improve your bone density.  Do not exercise so much that you hurt  yourself, feel dizzy, or get very short of breath.  Before you start a new exercise program, talk with your health care provider.  This information is not intended to replace advice given to you by your health care provider. Make sure you discuss any questions you have with your health care provider.  Document Revised: 04/15/2022 Document Reviewed: 04/15/2022  [employer] Patient Education © 2024 [employer]      Advance Care Planning and Advance Directives     You make decisions on a daily basis - decisions about where you want to live, your career, your home, your life. Perhaps one of the most important decisions you face is your choice for future medical care. Take time to talk with your family and your healthcare team and start planning today.  Advance Care Planning is a process that can help you:  Understand possible future healthcare decisions in light of your own experiences  Reflect on those decision in light of your goals and values  Discuss your decisions with those closest to you and the healthcare professionals that care for you  Make a plan by creating a document that reflects your wishes    Surrogate Decision Maker  In the event of a medical emergency, which has left you unable to communicate or to make your own decisions, you would need someone to make decisions for you.  It is important to discuss your preferences for medical treatment with this person while you are in good health.     Qualities of a surrogate decision maker:  Willing to take on this role and responsibility  Knows what you want for future medical care  Willing to follow your wishes even if they don't agree with them  Able to make difficult medical decisions under stressful circumstances    Advance Directives  These are legal documents you can create that will guide your healthcare team and decision maker(s) when needed. These documents can be stored in the electronic medical record.    Living Will - a legal document to guide your care  if you have a terminal condition or a serious illness and are unable to communicate. States vary by statute in document names/types, but most forms may include one or more of the following:        -  Directions regarding life-prolonging treatments        -  Directions regarding artificially provided nutrition/hydration        -  Choosing a healthcare decision maker        -  Direction regarding organ/tissue donation    Durable Power of  for Healthcare - this document names an -in-fact to make medical decisions for you, but it may also allow this person to make personal and financial decisions for you. Please seek the advice of an  if you need this type of document.    **Advance Directives are not required and no one may discriminate against you if you do not sign one.    Medical Orders  Many states allow specific forms/orders signed by your physician to record your wishes for medical treatment in your current state of health. This form, signed in personal communication with your physician, addresses resuscitation and other medical interventions that you may or may not want.      For more information or to schedule a time with a [facility] Advance Care Planning Facilitator contact: [facility] or call [phone] and someone will contact you directly.  You are due for Shingrix vaccination series ( the newest shingles vaccine).  It is a two shot series spaced 2-6 months apart. Please get this vaccine series started at your earliest convenience at your local pharmacy to help avoid shingles outbreak. It is more effective than the old Zostavax vaccine and is recommended even if you have had the Zostavax vaccine in the past.  Once the Shingrix series is completed, it does not need to be repeated.   For more information, please look at the website below:  [facility] Shingrix Vaccine Information    RSV vaccine is recommended to prevent Respiratory syncytial virus infection. It is recommended  for adults over age 60. Please get this vaccination at your local pharmacy.  I have included some information about this infection for your consideration.     Respiratory Syncytial Virus Infection, Adult  Respiratory syncytial virus (RSV) infection is an infection caused by RSV, a common virus. This virus is similar to viruses that cause the common cold and the flu. RSV infection can affect the nose, throat, windpipe, and lungs (respiratory system). When the infection is severe, it can cause:  Bronchiolitis. This condition causes inflammation of the air passages in the lungs (bronchioles).  Pneumonia. This condition causes inflammation of the air sacs in the lungs.  RSV infection spreads from person to person (is contagious) through droplets from coughs and sneezes (respiratory secretions). This condition is rarely serious when it occurs in adults.  What are the causes?  This condition is caused by contact with RSV. This can happen by:  Breathing respiratory secretions from someone who has the infection.  Touching something that has been exposed to the virus (is contaminated) and then touching your mouth, nose, or eyes.  Coming in close contact with someone who has this infection. This may happen if you:  Hug or kiss.  Shake or hold hands.  Eat or drink using the same dishes or utensils.  What increases the risk?  The following factors may make you more likely to develop this condition:  Being 65 years of age or older.  Having certain health conditions, including:  A long-term (chronic) lung condition, such as chronic obstructive pulmonary disease (COPD).  An immune system that is weak. This is your body's defense system.  Down syndrome.  Heart disease.  Working in a hospital or other health care facility.  Living in a long-term health care facility.  RSV infections are most common from the months of November to April, but they can happen any time of year.  What are the signs or symptoms?  Symptoms of this condition  include:  Having a runny nose.  Coughing. You may have a cough that brings up mucus (productive cough).  Sneezing.  Having a fever.  Wanting to eat less than usual.  Breathing loudly (wheezing).  Having shortness of breath.  Having fluid build up in the lungs (respiratory distress).  How is this diagnosed?  This condition may be diagnosed based on:  Your symptoms.  Your medical history.  A physical exam.  A chest X-ray to rule out pneumonia.  Blood tests or tests of mucus from your lungs (sputum). These tests may be done for older adults.  A test of a sample of your respiratory secretions.  How is this treated?  In most cases, the RSV infection will go away after 1-2 weeks of caring for yourself at home.   Sometimes, RSV infection is severe and can cause bronchiolitis or pneumonia. If you develop one or both of these conditions, you may need to be treated in the hospital. You may be given:  Oxygen therapy.  Antiviral medicine.  Medicines to open your bronchioles (bronchodilators).  Follow these instructions at home:  Medicines  Take over-the-counter and prescription medicines only as told by your health care provider.  If you were prescribed an antiviral medicine, take it as told by your health care provider. Do not stop using the antiviral even if you start to feel better.  Lifestyle    Eat a healthy diet.  Do not drink alcohol.  Do not use any products that contain nicotine or tobacco, such as cigarettes, e-cigarettes, and chewing tobacco. If you need help quitting, ask your health care provider.  Rest at home until your symptoms go away.  Return to your normal activities as told by your health care provider. Ask your health care provider what activities are safe for you.  General instructions    Drink enough fluid to keep your urine pale yellow.  Gargle with a salt-water mixture 3-4 times a day or as needed. To make a salt-water mixture, completely dissolve ½-1 tsp (3-6 g) of salt in 1 cup (237 mL) of warm  water.  Keep all follow-up visits as told by your health care provider. This is important.  How is this prevented?  To prevent catching and spreading RSV:  Wash your hands often with soap and water for at least 20 seconds. If soap and water are not available, use hand . Do not touch your face without first cleaning your hands.  Stay home if you have symptoms of the common cold or the flu.  Cover your nose and mouth when you cough or sneeze.  Avoid large groups of people.  Keep a safe distance of about 6 feet (1.8 m) from people who are coughing or sneezing.  Where to find more information  Centers for Disease Control and Prevention: www.cdc.gov  Contact a health care provider if:  Your symptoms get worse or have not changed after 2 weeks.  You have:  A fever.  Hot flashes, sweating, or chills that keep happening.  A cough that brings up much more mucus than usual.  A cough that brings up blood.  You feel:  Very tired (lethargic).  Confused.  Get help right away if:  You have increased or severe trouble breathing.  You lose consciousness.  These symptoms may represent a serious problem that is an emergency. Do not wait to see if the symptoms will go away. Get medical help right away. Call your local emergency services (911 in the [place] Do not drive yourself to the hospital.  Summary  Respiratory syncytial virus (RSV) infection is an infection caused by RSV, a common virus. RSV infection can affect the nose, throat, windpipe, and lungs (respiratory system).  When the infection is severe, it can cause bronchiolitis or pneumonia.  Take over-the-counter and prescription medicines only as told by your health care provider.  Contact a health care provider if your symptoms get worse or have not changed after 2 weeks.  This information is not intended to replace advice given to you by your health care provider. Make sure you discuss any questions you have with your health care provider.  Document Revised:  09/30/2020 Document Reviewed: 10/07/2020  [employer] Patient Education © 2022 [employer]     You are due for Covid booster. Please get this vaccine at your local pharmacy. COVID-19 vaccine may be administered without regards to timing of other vaccines.  If administering on the same day as another vaccine, administer in a different site (arm).  COVID-19 VACCINE reduces the risk of COVID-19. It does not treat COVID-19. It is still possible to get COVID-19 after receiving this vaccine, but the symptoms may be less severe or not last as long. It works by helping your immune system learn how to fight off a future infection.     What side effects may I notice from receiving this medication?  Side effects that you should report to your care team as soon as possible:  Allergic reactions--skin rash, itching, hives, swelling of the face, lips, tongue, or throat  Heart muscle inflammation--unusual weakness or fatigue, shortness of breath, chest pain, fast or irregular heartbeat, dizziness, swelling of the ankles, feet, or hands  Side effects that usually do not require medical attention (report these to your care team if they continue or are bothersome):  Chills  Fatigue  Fever  Headache  Joint pain  Muscle pain  Nausea  Pain, redness, or irritation at injection site  Swollen lymph nodes  Vomiting  This list may not describe all possible side effects. Call your doctor for medical advice about side effects. You may report side effects to FDA at 1-543-FDA-5880.     Annual flu shot has been recommended to patient. If you are age 65 or greater, then get the high dose influenza vaccination. Optimal timing of this vaccination is in mid October of each year.    Influenza (Flu) Vaccine (Inactivated or Recombinant): What You Need to Know  1. Why get vaccinated?  Influenza vaccine can prevent influenza (flu).  Flu is a contagious disease that spreads around the [place] every year, usually between October and May. Anyone can get  "the flu, but it is more dangerous for some people. Infants and young children, people 65 years and older, pregnant people, and people with certain health conditions or a weakened immune system are at greatest risk of flu complications.  Pneumonia, bronchitis, sinus infections, and ear infections are examples of flu-related complications. If you have a medical condition, such as heart disease, cancer, or diabetes, flu can make it worse.  Flu can cause fever and chills, sore throat, muscle aches, fatigue, cough, headache, and runny or stuffy nose. Some people may have vomiting and diarrhea, though this is more common in children than adults.  In an average year, thousands of people in the [place] die from flu, and many more are hospitalized. Flu vaccine prevents millions of illnesses and flu-related visits to the doctor each year.  2. Influenza vaccines  CDC recommends everyone 6 months and older get vaccinated every flu season. Children 6 months through 8 years of age may need 2 doses during a single flu season. Everyone else needs only 1 dose each flu season.  It takes about 2 weeks for protection to develop after vaccination.  There are many flu viruses, and they are always changing. Each year a new flu vaccine is made to protect against the influenza viruses believed to be likely to cause disease in the upcoming flu season. Even when the vaccine doesn't exactly match these viruses, it may still provide some protection.  Influenza vaccine does not cause flu.  Influenza vaccine may be given at the same time as other vaccines.  3. Talk with your health care provider  Tell your vaccination provider if the person getting the vaccine:  Has had an allergic reaction after a previous dose of influenza vaccine, or has any severe, life-threatening allergies  Has ever had Guillain-Barré Syndrome (also called \"GBS\")  In some cases, your health care provider may decide to postpone influenza vaccination until a future " visit.  Influenza vaccine can be administered at any time during pregnancy. People who are or will be pregnant during influenza season should receive inactivated influenza vaccine.  People with minor illnesses, such as a cold, may be vaccinated. People who are moderately or severely ill should usually wait until they recover before getting influenza vaccine.  Your health care provider can give you more information.  4. Risks of a vaccine reaction  Soreness, redness, and swelling where the shot is given, fever, muscle aches, and headache can happen after influenza vaccination.  There may be a very small increased risk of Guillain-Barré Syndrome (GBS) after inactivated influenza vaccine (the flu shot).  Young children who get the flu shot along with pneumococcal vaccine (PCV13) and/or DTaP vaccine at the same time might be slightly more likely to have a seizure caused by fever. Tell your health care provider if a child who is getting flu vaccine has ever had a seizure.  People sometimes faint after medical procedures, including vaccination. Tell your provider if you feel dizzy or have vision changes or ringing in the ears.  As with any medicine, there is a very remote chance of a vaccine causing a severe allergic reaction, other serious injury, or death.  5. What if there is a serious problem?  An allergic reaction could occur after the vaccinated person leaves the clinic. If you see signs of a severe allergic reaction (hives, swelling of the face and throat, difficulty breathing, a fast heartbeat, dizziness, or weakness), call 9-1-1 and get the person to the nearest hospital.  For other signs that concern you, call your health care provider.  Adverse reactions should be reported to the Vaccine Adverse Event Reporting System (VAERS). Your health care provider will usually file this report, or you can do it yourself. Visit the VAERS website at www.vaers.hhs.gov or call 1-988.632.9074. VAERS is only for reporting  reactions, and [facility] staff members do not give medical advice.  6. The National Vaccine Injury Compensation Program  The National Vaccine Injury Compensation Program (VICP) is a federal program that was created to compensate people who may have been injured by certain vaccines. Claims regarding alleged injury or death due to vaccination have a time limit for filing, which may be as short as two years. Visit the VICP website at www.[facility].gov/vaccinecompensation or call 1-110.975.1753 to learn about the program and about filing a claim.  7. How can I learn more?  Ask your health care provider.  Call your local or state health department.  Visit the website of the Food and Drug Administration (FDA) for vaccine package inserts and additional information at www.fda.gov/vaccines-blood-biologics/vaccines.  Contact the Centers for Disease Control and Prevention (CDC):  Call 1-805.672.8049 (5-538-PIJ-INFO) or  Visit CDC's website at www.cdc.gov/flu.  Source: CDC Vaccine Information Statement Inactivated Influenza Vaccine (2021)  This same material is available at www.cdc.gov for no charge.  This information is not intended to replace advice given to you by your health care provider. Make sure you discuss any questions you have with your health care provider.  Document Revised: 11/15/2022 Document Reviewed: 2022  Elsevier Patient Education ©  [employer]       Medicare Wellness  Personal Prevention Plan of Service     Date of Office Visit:    Encounter Provider:  Dylan Paulson MD  Place of Service:  [facility]  Patient Name: Dylan Mosher  :  1955    As part of the Medicare Wellness portion of your visit today, we are providing you with this personalized preventive plan of services (PPPS). This plan is based upon recommendations of the [place] Preventive Services Task Force (USPSTF) and the Advisory Committee on Immunization Practices (ACIP).    This lists the  preventive care services that should be considered, and provides dates of when you are due. Items listed as completed are up-to-date and do not require any further intervention.    Health Maintenance   Topic Date Due    RSV Vaccine - Adults (1 - 1-dose 60+ series) Never done    ZOSTER VACCINE (2 of 3) 09/16/2015    ANNUAL WELLNESS VISIT  Never done    DIABETIC FOOT EXAM  02/17/2022    COVID-19 Vaccine (5 - 2023-24 season) 09/01/2023    INFLUENZA VACCINE  08/01/2024    TDAP/TD VACCINES (3 - Td or Tdap) 10/30/2024    HEMOGLOBIN A1C  12/10/2024    DIABETIC EYE EXAM  01/17/2025    LIPID PANEL  06/10/2025    URINE MICROALBUMIN  06/10/2025    COLORECTAL CANCER SCREENING  12/06/2025    GASTROSCOPY (EGD)  04/08/2027    HEPATITIS C SCREENING  Completed    Pneumococcal Vaccine 65+  Completed    AAA SCREEN (ONE-TIME)  Completed       Orders Placed This Encounter   Procedures    Comprehensive Metabolic Panel     Standing Status:   Future     Standing Expiration Date:   6/17/2025     Order Specific Question:   Release to patient     Answer:   Routine Release [[id number]]    Lipid Panel     Standing Status:   Future     Standing Expiration Date:   6/17/2025     Order Specific Question:   Release to patient     Answer:   Routine Release [[id number]]    CK     Standing Status:   Future     Standing Expiration Date:   6/17/2025     Order Specific Question:   Release to patient     Answer:   Routine Release [[id number]]    Hemoglobin A1c     Standing Status:   Future     Standing Expiration Date:   6/17/2025     Order Specific Question:   Release to patient     Answer:   Routine Release [[id number]]    MicroAlbumin, Urine, Random - Urine, Clean Catch     Standing Status:   Future     Standing Expiration Date:   6/17/2025     Order Specific Question:   Release to patient     Answer:   Routine Release [[id number]]    CBC & Differential     Standing Status:   Future     Standing Expiration Date:   6/17/2025     Order Specific Question:    Manual Differential     Answer:   No     Order Specific Question:   Release to patient     Answer:   Routine Release [[id number]]       Return in about 6 months (around 12/17/2024) for recheck/refill medication.

## 2024-06-17 NOTE — PROGRESS NOTES
The ABCs of the Annual Wellness Visit  Subsequent Medicare Wellness Visit    Subjective    Dylan Mosher is a 68 y.o. male who presents for a Subsequent Medicare Wellness Visit.    The following portions of the patient's history were reviewed and   updated as appropriate: allergies, current medications, past family history, past medical history, past social history, past surgical history, and problem list.    Compared to one year ago, the patient feels his physical   health is the same.    Compared to one year ago, the patient feels his mental   health is the same.    Recent Hospitalizations:  He was not admitted to the hospital during the last year.       Current Medical Providers:  Patient Care Team:  Dylan Paulson MD as PCP - General (Family Medicine)  Atilio Cantrell MD as Consulting Physician (Ophthalmology)  Anne Puckett PA-C as Physician Assistant (Physician Assistant)    Outpatient Medications Prior to Visit   Medication Sig Dispense Refill    aspirin 81 MG EC tablet Take 1 tablet by mouth Daily. Take 1 tablet by oral route once daily      fexofenadine (ALLEGRA) 180 MG tablet Take 1 tablet by mouth Daily.      linaclotide (Linzess) 72 MCG capsule capsule Take 1 capsule by mouth Every Morning Before Breakfast. 30 capsule 11    Multiple Vitamins-Minerals (multivitamin with minerals) tablet tablet Take 1 tablet by mouth Daily.      omeprazole (priLOSEC) 40 MG capsule Take 1 capsule by mouth Daily. 90 capsule 3    atorvastatin (LIPITOR) 40 MG tablet TAKE 1 TABLET BY MOUTH AT BEDTIME 90 tablet 3    buPROPion XL (WELLBUTRIN XL) 300 MG 24 hr tablet Take 1 tablet by mouth every night at bedtime. 90 tablet 3    metFORMIN ER (GLUCOPHAGE-XR) 500 MG 24 hr tablet Take 4 tablets by mouth Daily With Dinner. 360 tablet 3    tadalafil (CIALIS) 20 MG tablet Take 1 tablet by mouth Daily As Needed for Erectile Dysfunction. 30 tablet 1     No facility-administered medications prior to visit.       No opioid  "medication identified on active medication list. I have reviewed chart for other potential  high risk medication/s and harmful drug interactions in the elderly.        Aspirin is on active medication list. Aspirin use is indicated based on review of current medical condition/s. Pros and cons of this therapy have been discussed today. Benefits of this medication outweigh potential harm.  Patient has been encouraged to continue taking this medication.  .      Patient Active Problem List   Diagnosis    ED (erectile dysfunction)    Anxiety    Colon polyp    Pure hypercholesterolemia    Sleep apnea    History of diverticulosis with bleeding    Type 2 diabetes mellitus without complication, without long-term current use of insulin    Nocturia    Coronary artery disease involving native coronary artery of native heart without angina pectoris    Aortic atherosclerosis    Sensory neuropathy of right hand    Trigger ring finger of left hand    Other dysphagia    Lo's esophagus without dysplasia    Gastroesophageal reflux disease with esophagitis without hemorrhage    Memory difficulties     Advance Care Planning   Advance Care Planning     Advance Directive is not on file.  ACP discussion was held with the patient during this visit. Patient has an advance directive (not in EMR), copy requested.     Objective    Vitals:    06/17/24 1254   BP: 100/62   Pulse: 80   SpO2: 99%   Weight: 69.9 kg (154 lb)   Height: 182.9 cm (72\")     Estimated body mass index is 20.89 kg/m² as calculated from the following:    Height as of this encounter: 182.9 cm (72\").    Weight as of this encounter: 69.9 kg (154 lb).    BMI is within normal parameters. No other follow-up for BMI required.      Does the patient have evidence of cognitive impairment? No    Lab Results   Component Value Date    CHLPL 150 06/10/2024    TRIG 154 (H) 06/10/2024    HDL 49 06/10/2024    LDL 75 06/10/2024    VLDL 26 06/10/2024    HGBA1C 6.90 (H) 06/10/2024      "   HEALTH RISK ASSESSMENT    Smoking Status:  Social History     Tobacco Use   Smoking Status Former    Current packs/day: 0.00    Average packs/day: 1.5 packs/day for 20.0 years (30.0 ttl pk-yrs)    Types: Cigarettes    Start date: 1971    Quit date: 1991    Years since quittin.4   Smokeless Tobacco Never     Alcohol Consumption:  Social History     Substance and Sexual Activity   Alcohol Use Yes    Comment: current some day; 2/month     Fall Risk Screen:    JOANNA Fall Risk Assessment was completed, and patient is at LOW risk for falls.Assessment completed on:2024    Depression Screenin/17/2024    12:59 PM   PHQ-2/PHQ-9 Depression Screening   Little Interest or Pleasure in Doing Things 0-->not at all   Feeling Down, Depressed or Hopeless 0-->not at all   PHQ-9: Brief Depression Severity Measure Score 0       Health Habits and Functional and Cognitive Screenin/17/2024    12:59 PM   Functional & Cognitive Status   Do you have difficulty preparing food and eating? No   Do you have difficulty bathing yourself, getting dressed or grooming yourself? No   Do you have difficulty using the toilet? No   Do you have difficulty moving around from place to place? No   Do you have trouble with steps or getting out of a bed or a chair? No   Current Diet Well Balanced Diet   Dental Exam Up to date   Eye Exam Up to date   Current Exercises Include No Regular Exercise   Do you need help using the phone?  No   Are you deaf or do you have serious difficulty hearing?  No   Do you need help to go to places out of walking distance? No   Do you need help shopping? No   Do you need help preparing meals?  No   Do you need help with housework?  No   Do you need help with laundry? No   Do you need help taking your medications? No   Do you need help managing money? No   Do you ever drive or ride in a car without wearing a seat belt? No   Have you felt unusual stress, anger or loneliness in the last month?  No   Who do you live with? Spouse   If you need help, do you have trouble finding someone available to you? No   Have you been bothered in the last four weeks by sexual problems? No   Do you have difficulty concentrating, remembering or making decisions? No       Age-appropriate Screening Schedule:  Refer to the list below for future screening recommendations based on patient's age, sex and/or medical conditions. Orders for these recommended tests are listed in the plan section. The patient has been provided with a written plan.    Health Maintenance   Topic Date Due    RSV Vaccine - Adults (1 - 1-dose 60+ series) Never done    ZOSTER VACCINE (2 of 3) 09/16/2015    ANNUAL WELLNESS VISIT  Never done    DIABETIC FOOT EXAM  02/17/2022    COVID-19 Vaccine (5 - 2023-24 season) 09/01/2023    INFLUENZA VACCINE  08/01/2024    TDAP/TD VACCINES (3 - Td or Tdap) 10/30/2024    HEMOGLOBIN A1C  12/10/2024    DIABETIC EYE EXAM  01/17/2025    LIPID PANEL  06/10/2025    URINE MICROALBUMIN  06/10/2025    COLORECTAL CANCER SCREENING  12/06/2025    GASTROSCOPY (EGD)  04/08/2027    HEPATITIS C SCREENING  Completed    Pneumococcal Vaccine 65+  Completed    AAA SCREEN (ONE-TIME)  Completed                  CMS Preventative Services Quick Reference  Risk Factors Identified During Encounter  Immunizations Discussed/Encouraged: Influenza, Shingrix, COVID19, and RSV (Respiratory Syncytial Virus)  Inactivity/Sedentary: Patient was advised to exercise at least 150 minutes a week per CDC recommendations.  The above risks/problems have been discussed with the patient.  Pertinent information has been shared with the patient in the After Visit Summary.  An After Visit Summary and PPPS were made available to the patient.    Follow Up:   Next Medicare Wellness visit to be scheduled in 1 year.       Additional E&M Note during same encounter follows:  Patient has multiple medical problems which are significant and separately identifiable that require  "additional work above and beyond the Medicare Wellness Visit.      Chief Complaint  Medicare Wellness-subsequent    Subjective        HPI  Dylan Mosher is also being seen today for lab review and to refill current medications. Overall he feels well. No medication side effects are reported.   History of Present Illness  The patient is a 68-year-old male who presents for subsequent Medicare wellness visit.    The patient is currently on atorvastatin for cholesterol management, bupropion, metformin (4 tablets daily), tadalafil (20 mg as needed for ED symptoms), omeprazole (prescribed by Dr. Jasvir Blackman), Linzess for constipation, baby aspirin, fexofenadine for allergies, and a multivitamin. He requires refills for all his medications.    He admits to not adhering to a healthy diet due to personal circumstances, but plans to resume this. Bupropion and atorvastatin have been effective for his anxiety, with no reported side effects. Compared to a year ago, he feels the same both physically and mentally. He was admitted to the ER in the past year for constipation. He denies experiencing chest pain. He does not have a living will. He reports difficulty remembering names, often forgetting the instructions given by his wife.            Objective   Vital Signs:  /62   Pulse 80   Ht 182.9 cm (72\")   Wt 69.9 kg (154 lb)   SpO2 99%   BMI 20.89 kg/m²     Physical Exam  Vitals reviewed.   Constitutional:       General: He is not in acute distress.  Eyes:      General: Lids are normal.      Conjunctiva/sclera: Conjunctivae normal.   Neck:      Vascular: No carotid bruit.      Trachea: No tracheal deviation.   Cardiovascular:      Rate and Rhythm: Normal rate and regular rhythm.      Heart sounds: Normal heart sounds. No murmur heard.  Pulmonary:      Effort: Pulmonary effort is normal.      Breath sounds: Normal breath sounds.   Skin:     General: Skin is warm and dry.   Neurological:      Mental Status: He is " alert. He is not disoriented.   Psychiatric:         Speech: Speech normal.         Behavior: Behavior normal. Behavior is cooperative.        Physical Exam      The following data was reviewed by: Dylan Paulson MD on 06/17/2024:  PSA DIAGNOSTIC (06/10/2024 08:21)  MicroAlbumin, Urine, Random - Urine, Clean Catch (06/10/2024 08:21)  Hemoglobin A1c (06/10/2024 08:21)  CK (06/10/2024 08:21)  Lipid Panel With / Chol / HDL Ratio (06/10/2024 08:21)  CBC & Differential (06/10/2024 08:21)  Comprehensive Metabolic Panel (06/10/2024 08:21)    Results  Laboratory Studies  White blood cell count is normal. Hemoglobin is 15.8. Glucose is 140. BUN 20, creatinine 1.20. Proteins are normal. Liver tests are normal. Total cholesterol is 150, triglycerides are 154, HDL is 49, LDL is 75. A1c is 6.9. PSA is 0.532. Creatinine kinase is 61.             Assessment and Plan   Orders Placed This Encounter   Procedures    Comprehensive Metabolic Panel    Lipid Panel    CK    Hemoglobin A1c    MicroAlbumin, Urine, Random - Urine, Clean Catch    CBC & Differential     New Medications Ordered This Visit   Medications    tadalafil (CIALIS) 20 MG tablet     Sig: Take 1 tablet by mouth Daily As Needed for Erectile Dysfunction.     Dispense:  30 tablet     Refill:  1     DX Code Needed  .    metFORMIN ER (GLUCOPHAGE-XR) 500 MG 24 hr tablet     Sig: Take 4 tablets by mouth Daily With Dinner for 270 days.     Dispense:  360 tablet     Refill:  2    buPROPion XL (WELLBUTRIN XL) 300 MG 24 hr tablet     Sig: Take 1 tablet by mouth every night at bedtime for 270 days.     Dispense:  90 tablet     Refill:  2    atorvastatin (LIPITOR) 40 MG tablet     Sig: Take 1 tablet by mouth every night at bedtime for 270 days.     Dispense:  90 tablet     Refill:  2     **Patient requests 90 days supply**     Assessment & Plan  1. Medicare annual wellness visit.  The recommendation is to resume aerobic exercise for 30 minutes, 5 days a week (such as a brisk walk for  30 minutes). Additionally, updating immunizations, including Shingrix, COVID-19, RSV, and annual influenza vaccines, are also recommended for the upcoming fall.    2. Type 2 diabetes.  The patient's condition has shown some progression. The current medication regimen will be maintained. A low-carbohydrate, low-fat diet was once again shared with the patient, which he will resume. A recheck in 6 months is planned.    3. Pure hypercholesterolemia.  The condition has shown a mild progression. No therapy is required at this time. Labs are to be rechecked in 6 months.    4. Anxiety.  The patient's condition is stable, but recent stress due to brother's illness has raised concerns about his memory. The patient is advised to schedule an appointment to further assess his memory with MoCA testing.    5. ED.  The patient's symptoms remain unchanged. The current medication regimen will be continued without any changes.    Follow-up  The patient is scheduled for a follow-up visit in 6 months for medication refill and lab review. However, an additional appointment for memory testing may be arranged at his convenience.       Follow Up   Return in about 6 months (around 12/17/2024) for recheck/refill medication.  Patient was given instructions and counseling regarding his condition or for health maintenance advice. Please see specific information pulled into the AVS if appropriate.     Patient or patient representative verbalized consent for the use of Ambient Listening during the visit with  Dylan Paulson MD for chart documentation. 6/17/2024  13:24 EDT

## 2024-07-03 ENCOUNTER — OFFICE VISIT (OUTPATIENT)
Dept: FAMILY MEDICINE CLINIC | Facility: CLINIC | Age: 69
End: 2024-07-03
Payer: MEDICARE

## 2024-07-03 VITALS
HEART RATE: 66 BPM | BODY MASS INDEX: 20.86 KG/M2 | HEIGHT: 72 IN | WEIGHT: 154 LBS | OXYGEN SATURATION: 98 % | DIASTOLIC BLOOD PRESSURE: 64 MMHG | SYSTOLIC BLOOD PRESSURE: 116 MMHG

## 2024-07-03 DIAGNOSIS — G31.84 MILD COGNITIVE IMPAIRMENT: Primary | ICD-10-CM

## 2024-07-03 PROCEDURE — 3044F HG A1C LEVEL LT 7.0%: CPT | Performed by: FAMILY MEDICINE

## 2024-07-03 PROCEDURE — G2211 COMPLEX E/M VISIT ADD ON: HCPCS | Performed by: FAMILY MEDICINE

## 2024-07-03 PROCEDURE — 99213 OFFICE O/P EST LOW 20 MIN: CPT | Performed by: FAMILY MEDICINE

## 2024-07-03 PROCEDURE — 1126F AMNT PAIN NOTED NONE PRSNT: CPT | Performed by: FAMILY MEDICINE

## 2024-07-03 RX ORDER — TADALAFIL 20 MG/1
20 TABLET ORAL DAILY PRN
Qty: 30 TABLET | Refills: 1 | Status: CANCELLED | OUTPATIENT
Start: 2024-07-03

## 2024-07-03 RX ORDER — ATORVASTATIN CALCIUM 40 MG/1
40 TABLET, FILM COATED ORAL
Qty: 90 TABLET | Refills: 2 | Status: CANCELLED | OUTPATIENT
Start: 2024-07-03 | End: 2025-03-30

## 2024-07-03 RX ORDER — METFORMIN HYDROCHLORIDE 500 MG/1
2000 TABLET, EXTENDED RELEASE ORAL
Qty: 360 TABLET | Refills: 2 | Status: CANCELLED | OUTPATIENT
Start: 2024-07-03 | End: 2025-03-30

## 2024-07-03 RX ORDER — BUPROPION HYDROCHLORIDE 300 MG/1
300 TABLET ORAL
Qty: 90 TABLET | Refills: 2 | Status: CANCELLED | OUTPATIENT
Start: 2024-07-03 | End: 2025-03-30

## 2024-07-03 NOTE — PROGRESS NOTES
"Chief Complaint  Memory Loss    Subjective        Memory Loss       History of Present Illness  The patient presents for evaluation of mild cognitive impairment.    The patient underwent a MoCA test during today's visit. He expresses concern regarding his short-term memory, which he first noticed approximately a year ago. His medical history includes a CT scan of his head in 2021.    Review of Systems   Neurological:  Positive for memory problem.        Vital Signs:   Vitals:    07/03/24 1425   BP: 116/64   Pulse: 66   SpO2: 98%   Weight: 69.9 kg (154 lb)   Height: 182.9 cm (72\")            6/17/2024    12:59 PM   PHQ-2/PHQ-9 Depression Screening   Little Interest or Pleasure in Doing Things 0-->not at all   Feeling Down, Depressed or Hopeless 0-->not at all   PHQ-9: Brief Depression Severity Measure Score 0       BMI is within normal parameters. No other follow-up for BMI required.        Physical Exam  Constitutional:       General: He is not in acute distress.  Neurological:      Mental Status: He is alert.      Comments: MoCA testing administered with score of 27 out of 30 see scanned document       Physical Exam           The following data was reviewed by: Dylan Paulson MD on 07/03/2024:  CT Angiogram Head (12/29/2021 22:21)     Results  Testing  MoCA testing was 27 out of 30.                Assessment and Plan           Assessment & Plan  1. Mild cognitive impairment.  The patient's MoCA testing yielded a score of 27 out of 30, indicating a mild deficit in delayed recall of five items. A recommendation was made for the patient to engage in regular activities, including puzzles, quizzing, and trivia. Additionally, adding hobbies, such as music or poetry, were recommended to enhance cognitive function.    Follow-up  A follow-up appointment with the [facility] is scheduled for approximately one year from now.       I spent 20 minutes caring for Dylan on this date of service. This time includes " time spent by me in the following activities:performing a medically appropriate examination and/or evaluation , counseling and educating the patient/family/caregiver, and documenting information in the medical record  Patient was given instructions and counseling regarding his condition or for health maintenance advice. Please see specific information pulled into the AVS if appropriate.     Patient or patient representative verbalized consent for the use of Ambient Listening during the visit with  Dylan Paulson MD for chart documentation. 7/3/2024  15:30 EDT

## 2024-08-02 ENCOUNTER — OFFICE VISIT (OUTPATIENT)
Dept: GASTROENTEROLOGY | Facility: CLINIC | Age: 69
End: 2024-08-02
Payer: MEDICARE

## 2024-08-02 VITALS
WEIGHT: 151.4 LBS | HEIGHT: 72 IN | HEART RATE: 91 BPM | BODY MASS INDEX: 20.51 KG/M2 | TEMPERATURE: 97.9 F | SYSTOLIC BLOOD PRESSURE: 120 MMHG | DIASTOLIC BLOOD PRESSURE: 75 MMHG

## 2024-08-02 DIAGNOSIS — K22.70 BARRETT'S ESOPHAGUS WITHOUT DYSPLASIA: ICD-10-CM

## 2024-08-02 DIAGNOSIS — K59.01 SLOW TRANSIT CONSTIPATION: ICD-10-CM

## 2024-08-02 DIAGNOSIS — K21.00 GASTROESOPHAGEAL REFLUX DISEASE WITH ESOPHAGITIS WITHOUT HEMORRHAGE: ICD-10-CM

## 2024-08-02 DIAGNOSIS — R13.12 OROPHARYNGEAL DYSPHAGIA: Primary | ICD-10-CM

## 2024-08-02 NOTE — PROGRESS NOTES
"Chief Complaint  Constipation    Subjective          History Of Present Illness:    Dylan Mosher is a  68 y.o. male  patient of Dr. Parks with a history of Lo's esophagus who presents as a follow-up for GERD and constipation.    Patient has been taking Linzess 72 mcg daily.  He does continue to have some intermittent loose stools with this therapy.  He has not tried every other day dosing.  No abdominal pain, melena, hematochezia.  Patient does remain on omeprazole 40 mg daily.  He continues to endorse trouble swallowing with specific meats generally.  He points to his neck and says he spits up the food almost immediately after chewing.  He denies nausea, vomiting, odynophagia.  Appetite is good and weight is stable.    Additional data reviewed:  C-scope by Dr. Whittington 9/26/19 -normal ileum, colon.  No specimens collected.  Recall 10 years.  EGD 4/8/24 -1 cm hiatal hernia, irregular Z-line, mild Schatzki's ring status post dilation, normal stomach and duodenum.  Pathology consistent with Lo's esophagus. Recall 3 years.     Objective   Vital Signs:   /75 (BP Location: Right arm, Patient Position: Sitting, Cuff Size: Adult)   Pulse 91   Temp 97.9 °F (36.6 °C)   Ht 182.9 cm (72\")   Wt 68.7 kg (151 lb 6.4 oz)   BMI 20.53 kg/m²       Physical Exam  Vitals reviewed.   Constitutional:       General: He is not in acute distress.     Appearance: Normal appearance. He is not ill-appearing.   HENT:      Head: Normocephalic and atraumatic.      Nose: Nose normal.      Mouth/Throat:      Pharynx: Oropharynx is clear.   Eyes:      Conjunctiva/sclera: Conjunctivae normal.   Pulmonary:      Effort: Pulmonary effort is normal.   Musculoskeletal:         General: No swelling. Normal range of motion.      Cervical back: Normal range of motion.   Skin:     General: Skin is warm and dry.      Findings: No bruising or rash.   Neurological:      General: No focal deficit present.      Mental Status: He is alert and " oriented to person, place, and time.      Motor: No weakness.      Gait: Gait normal.   Psychiatric:         Mood and Affect: Mood normal.          Result Review :   The following data was reviewed by: Anne Lamb PA-C on 08/02/2024:  CMP          6/10/2024    08:21   CMP   Glucose 140    BUN 20    Creatinine 1.20    Sodium 138    Potassium 5.0    Chloride 101    Calcium 9.7    Total Protein 7.0    Albumin 4.7    Globulin 2.3    Total Bilirubin 0.7    Alkaline Phosphatase 53    AST (SGOT) 19    ALT (SGPT) 22    BUN/Creatinine Ratio 16.7      CBC          6/10/2024    08:21   CBC   WBC 8.97    RBC 5.24    Hemoglobin 15.8    Hematocrit 47.0    MCV 89.7    MCH 30.2    MCHC 33.6    RDW 14.4    Platelets 179            Assessment and Plan    Diagnoses and all orders for this visit:    1. Oropharyngeal dysphagia (Primary)  -     FL Video Swallow With Speech; Future    2. Lo's esophagus without dysplasia    3. Gastroesophageal reflux disease with esophagitis without hemorrhage    4. Slow transit constipation       Patient continues to have trouble swallowing following his endoscopy.  He did have a Schatzki's ring which was dilated.  Symptomology seems more consistent with oropharyngeal dysphagia.  Will refer him to SLP for VFSS.  Continue omeprazole 40 mg daily with history of Lo's esophagus  Continue Linzess 72 mcg - try every other day dosing to minimize loose bowel movements    Follow Up   Return in about 1 year (around 8/2/2025) for Anne Puckett PA-C.    Dragon dictation used throughout this note.            Anne Puckett PA-C   [facility]  [address] [place] [address]  Office: [phone]

## 2024-09-12 ENCOUNTER — HOSPITAL ENCOUNTER (OUTPATIENT)
Dept: GENERAL RADIOLOGY | Facility: HOSPITAL | Age: 69
Discharge: HOME | End: 2024-09-12
Payer: MEDICARE

## 2024-09-12 DIAGNOSIS — R13.12 OROPHARYNGEAL DYSPHAGIA: Primary | ICD-10-CM

## 2024-09-12 DIAGNOSIS — R13.12 OROPHARYNGEAL DYSPHAGIA: ICD-10-CM

## 2024-09-12 PROCEDURE — 92611 MOTION FLUOROSCOPY/SWALLOW: CPT

## 2024-09-12 PROCEDURE — 74230 X-RAY XM SWLNG FUNCJ C+: CPT

## 2024-09-12 RX ADMIN — BARIUM SULFATE 1 TEASPOON(S): 0.6 CREAM ORAL at 14:08

## 2024-09-12 RX ADMIN — BARIUM SULFATE 55 ML: 0.81 POWDER, FOR SUSPENSION ORAL at 14:08

## 2024-09-12 RX ADMIN — BARIUM SULFATE 135 ML: 980 POWDER, FOR SUSPENSION ORAL at 14:08

## 2024-09-12 NOTE — MBS/VFSS/FEES
Speech Language Pathology   MBS FEES / Discharge Summary  [facility]       Patient Name: Dylan Mosher  : 1955  MRN: [id number]    Today's Date: 2024      Visit Date: 2024     Visit Dx:     ICD-10-CM ICD-9-CM   1. Oropharyngeal dysphagia  R13.12 787.22       Patient Active Problem List   Diagnosis    ED (erectile dysfunction)    Anxiety    Colon polyp    Pure hypercholesterolemia    Sleep apnea    History of diverticulosis with bleeding    Type 2 diabetes mellitus without complication, without long-term current use of insulin    Nocturia    Coronary artery disease involving native coronary artery of native heart without angina pectoris    Aortic atherosclerosis    Sensory neuropathy of right hand    Trigger ring finger of left hand    Other dysphagia    Lo's esophagus without dysplasia    Gastroesophageal reflux disease with esophagitis without hemorrhage    Mild cognitive impairment        Past Medical History:   Diagnosis Date    Acute blood loss anemia 2019    Anorgasmia 2008    Anxiety 2012    Lo esophagus     Colitis 2019    Colon polyp     Controlled diabetes mellitus type II without complication 2014    CTS (carpal tunnel syndrome) 2009    bilateral    CTS (carpal tunnel syndrome) 2009    right    Diverticulitis of colon without hemorrhage     Esophagitis     unspecified    Fatigue 2008    daytime somnolence    GERD (gastroesophageal reflux disease)     Grief reaction 2014    fiance of 6 years  of colon cancer    History of stomach ulcers     History of stress test 2012    normal    LFT elevation 01/15/2014    PONV (postoperative nausea and vomiting)     Pure hypercholesterolemia 2011    Screening for prostate cancer 2008    Sleep apnea 2008    no machine    Tinnitus 2013    Trapezius strain 2007    right with radiculopathy    Trigger middle finger of left hand 2015    Type  II diabetes mellitus, uncontrolled 03/23/2011        Past Surgical History:   Procedure Laterality Date    CHOLECYSTECTOMY  01/22/2013    Dr Newton    COLONOSCOPY      ENDOSCOPY  08/15/2012    esophagitis    ENDOSCOPY N/A 12/06/2022    Procedure: ESOPHAGOGASTRODUODENOSCOPY with biopsies and esophageal dilatation via 56, 60 graham;  Surgeon: Jai Parks MD;  Location: [facility] ENDOSCOPY;  Service: Gastroenterology;  Laterality: N/A;  pre-dysphagia  post-schatzki's ring, irrregular zline    ENDOSCOPY N/A 04/08/2024    Procedure: ESOPHAGOGASTRODUODENOSCOPY WITH BIOPSY AND DILATION;  Surgeon: Jai Parks MD;  [place] [facility] [place]  Service: Gastroenterology;  Laterality: N/A;  IRREGULAR Z-LINE, HIATAL HERNIA, BALLOON DILATION TO 18    EYE SURGERY  02/08/2018    left eye    UPPER GASTROINTESTINAL ENDOSCOPY      WRIST SURGERY Left 2011    carpal tunnel                  OP SLP Assessment/Plan - 09/12/24 1400          SLP Assessment    Functional Problems Swallowing  -OC    Impact on Function: Swallowing Risk of aspiration  -OC    Clinical Impression: Swallowing Mild:;oropharyngeal phase dysphagia  -OC    Functional Problems Comment symptom of residuals in pharynx  -OC    Clinical Impression Comments mild oropharyngeal dysphagia  -OC    Please refer to paper survey for additional self-reported information No  -OC    Please refer to items scanned into chart for additional diagnostic informaiton and handouts as provided by clinician No  -OC    SLP Diagnosis mild oropharyngeal dysphagia  -OC    Prognosis Good (comment)  -OC    Patient/caregiver participated in establishment of treatment plan and goals Yes  -OC    Patient would benefit from skilled therapy intervention Yes   trial therapy to aid in compensation for increased epiglottic deflection with cervical osteophytes -OC       SLP Plan    Frequency TBD  -OC    Plan Comments Discuss trial OP therapy and/or HEP with patient.  -OC              User Key  (r) =  Recorded By, (t) = Taken By, (c) = Cosigned By      Initials Name Provider Type    OC Sol Moore SLP Speech and Language Pathologist                     SLP Adult Swallow Evaluation       Row Name 09/12/24 1400       Rehab Evaluation    Document Type evaluation  -OC    Subjective Information no complaints  -OC    Patient Observations alert;cooperative;agree to therapy  -OC    Patient Effort good  -OC    Symptoms Noted During/After Treatment none  -OC       General Information    Patient Profile Reviewed yes  -OC    Pertinent History Of Current Problem c/o food sticking throat/upper esophagus.  -OC    Current Method of Nutrition regular textures;thin liquids  -OC    Precautions/Limitations, Vision WFL with corrective lenses  -OC    Precautions/Limitations, Hearing WFL  -OC    Prior Level of Function-Communication WFL  -OC    Prior Level of Function-Swallowing no diet consistency restrictions  -OC    Plans/Goals Discussed with patient;agreed upon  -OC    Barriers to Rehab none identified  -OC    Patient's Goals for Discharge patient did not state  -OC       Pain    Additional Documentation --  no pain reported  -OC       Oral Motor Structure and Function    Dentition Assessment natural, present and adequate  -OC       Oral Musculature and Cranial Nerve Assessment    Oral Motor General Assessment WFL  -OC       MBS/VFSS Interpretation    VFSS Summary VFSS completed, Tanya IBARRA present.  Patient presents with mild oropharyngeal dysphagia characterized by reduced epiglottic deflection.  Cervical osteophytes impacting epiglottic deflection.  No penetration or aspiration with thin via cup/straw, purée, mechanical soft, and regular textures.  Mild pharyngeal/vallecular residue status post trials.  Patient demonstrated piecemeal deglutition with premature spillage to the pyriforms with mechanical soft.  -OC       SLP Communication to Radiology    Summary Statement VFSS completed, Tanya IBARRA present. Patient  presents with mild oropharyngeal dysphagia characterized by reduced epiglottic deflection. Cervical osteophytes impacting epiglottic deflection. No penetration or aspiration with thin via cup/straw, purée, mechanical soft, and regular textures. Mild pharyngeal/vallecular residue status post trials. Patient demonstrated piecemeal deglutition with premature spillage to the pyriforms with mechanical soft.  -OC       SLP Evaluation Clinical Impression    SLP Swallowing Diagnosis mild;oral dysphagia;pharyngeal dysphagia  -OC    Functional Impact risk of aspiration/pneumonia  -OC    Rehab Potential/Prognosis, Swallowing good, to achieve stated therapy goals  -OC    Swallow Criteria for Skilled Therapeutic Interventions Met demonstrates skilled criteria  -OC       Recommendations    Therapy Frequency (Swallow) PRN  -OC    Predicted Duration Therapy Intervention (Days) until discharge  -OC    SLP Diet Recommendation regular textures;thin liquids;other (see comments)  small bites  -OC    Recommended Precautions and Strategies upright posture during/after eating;small bites of food and sips of liquid;alternate between small bites of food and sips of liquid  -OC    Oral Care Recommendations Oral Care BID/PRN  -OC    SLP Rec. for Method of Medication Administration meds crushed;meds whole;as tolerated  -OC    Monitor for Signs of Aspiration yes;notify SLP if any concerns  -OC    Anticipated Discharge Disposition (SLP) other (see comments)  could trial OP therapy in attempt to compensate/alleviate symptoms  -OC              User Key  (r) = Recorded By, (t) = Taken By, (c) = Cosigned By      Initials Name Provider Type    Sol Rivera SLP Speech and Language Pathologist                                    OP SLP Education       Row Name 09/12/24 6196       Education    Barriers to Learning No barriers identified  -OC    Education Provided Described results of evaluation;Patient expressed understanding of evaluation  -OC     Assessed Learning needs;Learning motivation;Learning preferences;Learning readiness  -OC    Learning Motivation Strong  -OC    Learning Method Explanation  -OC    Teaching Response Verbalized understanding  -OC              User Key  (r) = Recorded By, (t) = Taken By, (c) = Cosigned By      Initials Name Effective Dates    OC Sol Moore SLP 08/28/23 -                                          Time Calculation:   Untimed Charges  SLP Eval/Re-eval : ST Motion Fluoro Eval Swallow - 33394  96349-IJ Motion Fluoro Eval Swallow Minutes: 45  Total Minutes  Untimed Charges Total Minutes: 45   Total Minutes: 45    Therapy Charges for Today       Code Description Service Date Service Provider Modifiers Qty    23268616126 HC ST MOTION FLUORO EVAL SWALLOW 3 9/12/2024 Sol Moore SLP GN 1                    ANAHI Espinosa  9/12/2024

## 2024-10-15 ENCOUNTER — HOSPITAL ENCOUNTER (OUTPATIENT)
Dept: SPEECH THERAPY | Facility: HOSPITAL | Age: 69
Setting detail: THERAPIES SERIES
Discharge: HOME | End: 2024-10-15
Payer: MEDICARE

## 2024-10-15 DIAGNOSIS — R13.12 OROPHARYNGEAL DYSPHAGIA: Primary | ICD-10-CM

## 2024-10-15 PROCEDURE — 92610 EVALUATE SWALLOWING FUNCTION: CPT | Performed by: SPEECH-LANGUAGE PATHOLOGIST

## 2024-10-15 NOTE — THERAPY EVALUATION
Outpatient Speech Language Pathology   Adult Swallow Initial Evaluation  [facility]     Patient Name: Dylan Mosher  : 1955  MRN: [id number]  Today's Date: 10/15/2024         Visit Date: 10/15/2024   Patient Active Problem List   Diagnosis    ED (erectile dysfunction)    Anxiety    Colon polyp    Pure hypercholesterolemia    Sleep apnea    History of diverticulosis with bleeding    Type 2 diabetes mellitus without complication, without long-term current use of insulin    Nocturia    Coronary artery disease involving native coronary artery of native heart without angina pectoris    Aortic atherosclerosis    Sensory neuropathy of right hand    Trigger ring finger of left hand    Other dysphagia    Lo's esophagus without dysplasia    Gastroesophageal reflux disease with esophagitis without hemorrhage    Mild cognitive impairment        Past Medical History:   Diagnosis Date    Acute blood loss anemia 2019    Anorgasmia 2008    Anxiety 2012    Lo esophagus     Colitis 2019    Colon polyp     Controlled diabetes mellitus type II without complication 2014    CTS (carpal tunnel syndrome) 2009    bilateral    CTS (carpal tunnel syndrome) 2009    right    Diverticulitis of colon without hemorrhage     Esophagitis     unspecified    Fatigue 2008    daytime somnolence    GERD (gastroesophageal reflux disease)     Grief reaction 2014    fiance of 6 years  of colon cancer    History of stomach ulcers     History of stress test 2012    normal    LFT elevation 01/15/2014    PONV (postoperative nausea and vomiting)     Pure hypercholesterolemia 2011    Screening for prostate cancer 2008    Sleep apnea 2008    no machine    Tinnitus 2013    Trapezius strain 2007    right with radiculopathy    Trigger middle finger of left hand 2015    Type II diabetes mellitus, uncontrolled 2011        Past Surgical  History:   Procedure Laterality Date    CHOLECYSTECTOMY  01/22/2013    Dr Newton    COLONOSCOPY      ENDOSCOPY  08/15/2012    esophagitis    ENDOSCOPY N/A 12/06/2022    Procedure: ESOPHAGOGASTRODUODENOSCOPY with biopsies and esophageal dilatation via 56, 60 graham;  Surgeon: Jai Parks MD;  Location: [facility] ENDOSCOPY;  Service: Gastroenterology;  Laterality: N/A;  pre-dysphagia  post-schatzki's ring, irrregular zline    ENDOSCOPY N/A 04/08/2024    Procedure: ESOPHAGOGASTRODUODENOSCOPY WITH BIOPSY AND DILATION;  Surgeon: Jai Parks MD;  Location: [facility] [place];  Service: Gastroenterology;  Laterality: N/A;  IRREGULAR Z-LINE, HIATAL HERNIA, BALLOON DILATION TO 18    EYE SURGERY  02/08/2018    left eye    UPPER GASTROINTESTINAL ENDOSCOPY      WRIST SURGERY Left 2011    carpal tunnel         Visit Dx:     ICD-10-CM ICD-9-CM   1. Oropharyngeal dysphagia  R13.12 787.22            OP SLP Assessment/Plan - 10/15/24 1609          SLP Assessment    Functional Problems Swallowing  -KA    Impact on Function: Swallowing Risk of aspiration;Risk of pneumonia  -KA    Clinical Impression: Swallowing Mild:;oropharyngeal phase dysphagia  -KA    Please refer to paper survey for additional self-reported information Yes  -KA    Please refer to items scanned into chart for additional diagnostic informaiton and handouts as provided by clinician Yes  -KA              User Key  (r) = Recorded By, (t) = Taken By, (c) = Cosigned By      Initials Name Provider Type    Nima Aguilar SLP Speech and Language Pathologist                     SLP Adult Swallow Evaluation       Row Name 10/15/24 1500       Rehab Evaluation    Document Type evaluation  -KA    Subjective Information no complaints  -KA    Patient Observations alert;cooperative  -KA    Patient Effort good  -KA    Symptoms Noted During/After Treatment none  -KA       General Information    Patient Profile Reviewed yes  -KA    Pertinent History Of Current Problem Patient  "referred for dysphagia therapy. Patient with recent VFSS on 9/12/24 revealing \" Patient presents with mild oropharyngeal dysphagia characterized by reduced epiglottic deflection.  Cervical osteophytes impacting epiglottic deflection.  No penetration or aspiration with thin via cup/straw, purée, mechanical soft, and regular textures.  Mild pharyngeal/vallecular residue status post trials.  Patient demonstrated piecemeal deglutition with premature spillage to the pyriforms with mechanical soft.\" Patient c/o solids (sometimes pills) feeling stuck in throat and at times coughing up food. Pt denies recent hx of PNA  -KA    Current Method of Nutrition regular textures;thin liquids  -KA    Precautions/Limitations, Vision WFL  -KA    Precautions/Limitations, Hearing WFL  -KA    Prior Level of Function-Communication WFL  -KA    Prior Level of Function-Swallowing no diet consistency restrictions  -KA    Plans/Goals Discussed with patient  -KA    Barriers to Rehab none identified  -KA    Patient's Goals for Discharge --  to reduce food getting stuck in throat  -KA       Oral Motor Structure and Function    Dentition Assessment natural, present and adequate  -KA    Secretion Management WNL/WFL  -KA    Mucosal Quality moist, healthy  -KA       Oral Musculature and Cranial Nerve Assessment    Oral Motor General Assessment WFL  -KA       General Eating/Swallowing Observations    Eating/Swallowing Skills self-fed  -KA    Positioning During Eating upright in bed  -KA    Utensils Used spoon;cup  -KA    Consistencies Trialed thin liquids;pureed  -KA       Clinical Swallow Eval    Clinical Swallow Evaluation Summary SLP reviewed results of VFSS with pt today including VFSS images. Discussed pt main suspected impact on swallow is reduced epiglottic deflection due to suspected cervical osteophytes resulting in mild vallecular residue. Discussed strategies of liquid wash (cleared residue during VFSS), double swallows, alternate liquids " and solid, small bites and sips. With puree and thin liquid trials pt demonstrated no overt s/s of pen/asp and independently utilized strategies of hard multiple swallows and alternate liquids and solids. SLP educated pt on jose luis and effortful swallow exercises which pt demonstrated adequately and completed 5 exercises independently. Pt felt he comprehended and understood all education completed today and did not feel need to return. Recommend repeating VFSS and return to  in future as indicated.  -KA       SLP Evaluation Clinical Impression    SLP Swallowing Diagnosis mild;oral dysphagia;pharyngeal dysphagia  -    Functional Impact risk of aspiration/pneumonia  -    Swallow Criteria for Skilled Therapeutic Interventions Met other (see comments)  education completed today  -KA       Recommendations    Therapy Frequency (Swallow) evaluation only  -KA    SLP Diet Recommendation regular textures;thin liquids  -KA    Recommended Precautions and Strategies upright posture during/after eating;small bites of food and sips of liquid;multiple swallows per bite of food;multiple swallows per sip of liquid;alternate between small bites of food and sips of liquid  -KA    Oral Care Recommendations Oral Care BID/PRN  -KA    SLP Rec. for Method of Medication Administration meds whole;meds crushed;as tolerated  -KA    Monitor for Signs of Aspiration yes;notify SLP if any concerns  -KA    Anticipated Discharge Disposition (SLP) home  -KA              User Key  (r) = Recorded By, (t) = Taken By, (c) = Cosigned By      Initials Name Provider Type    Nima Aguilar SLP Speech and Language Pathologist                                   OP SLP Education       Row Name 10/15/24 8275       Education    Barriers to Learning No barriers identified  -KA    Education Provided Patient expressed understanding of evaluation;Described results of evaluation  -KA    Assessed Learning needs;Learning motivation  -    Learning Motivation  Strong  -CEDRICK    Learning Method Explanation;Demonstration;Written materials  -CEDRICK    Teaching Response Demonstrated understanding;Verbalized understanding  -CEDRICK    Education Comments All education completed today on swallow precautions and swallow exercises which pt demonstrated adequately, handouts also provided on strategies and exercises.  -CEDRICK              User Key  (r) = Recorded By, (t) = Taken By, (c) = Cosigned By      Initials Name Effective Dates    Nima Aguilar SLP 01/05/24 -                                Time Calculation:   SLP Start Time: 1430  SLP Stop Time: 1508  SLP Time Calculation (min): 38 min  Untimed Charges  82713-IB Eval Oral Pharyng Swallow Minutes: 38  Total Minutes  Untimed Charges Total Minutes: 38   Total Minutes: 38    Therapy Charges for Today       Code Description Service Date Service Provider Modifiers Qty    53284719828 HC ST EVAL ORAL PHARYNG SWALLOW 3 10/15/2024 Nima Onofre SLP GN 1                     ANAHI Herrera  10/15/2024

## 2024-11-18 ENCOUNTER — TELEPHONE (OUTPATIENT)
Dept: FAMILY MEDICINE CLINIC | Facility: CLINIC | Age: 69
End: 2024-11-18

## 2024-11-18 NOTE — TELEPHONE ENCOUNTER
"Caller: Dylan Mosher \"Yaakov\"    Relationship to patient: Self    Best call back number: [phone]       Type of visit: LAB APPOINTMENT    Requested date: BEFORE APPOINTMENT ON 12/18/24    Additional notes:PLEASE CALL TO SCHEDULE    "

## 2024-12-13 DIAGNOSIS — K22.70 BARRETT'S ESOPHAGUS WITHOUT DYSPLASIA: ICD-10-CM

## 2024-12-13 DIAGNOSIS — K21.00 GASTROESOPHAGEAL REFLUX DISEASE WITH ESOPHAGITIS WITHOUT HEMORRHAGE: ICD-10-CM

## 2024-12-13 RX ORDER — OMEPRAZOLE 40 MG/1
40 CAPSULE, DELAYED RELEASE ORAL DAILY
Qty: 90 CAPSULE | Refills: 2 | Status: SHIPPED | OUTPATIENT
Start: 2024-12-13

## 2024-12-18 ENCOUNTER — OFFICE VISIT (OUTPATIENT)
Dept: FAMILY MEDICINE CLINIC | Facility: CLINIC | Age: 69
End: 2024-12-18
Payer: MEDICARE

## 2024-12-18 VITALS
HEIGHT: 72 IN | HEART RATE: 88 BPM | DIASTOLIC BLOOD PRESSURE: 68 MMHG | BODY MASS INDEX: 21.54 KG/M2 | SYSTOLIC BLOOD PRESSURE: 126 MMHG | WEIGHT: 159 LBS | OXYGEN SATURATION: 99 %

## 2024-12-18 DIAGNOSIS — K22.70 BARRETT'S ESOPHAGUS WITHOUT DYSPLASIA: ICD-10-CM

## 2024-12-18 DIAGNOSIS — N52.9 ERECTILE DYSFUNCTION, UNSPECIFIED ERECTILE DYSFUNCTION TYPE: ICD-10-CM

## 2024-12-18 DIAGNOSIS — K21.00 GASTROESOPHAGEAL REFLUX DISEASE WITH ESOPHAGITIS WITHOUT HEMORRHAGE: ICD-10-CM

## 2024-12-18 DIAGNOSIS — F41.9 ANXIETY: ICD-10-CM

## 2024-12-18 DIAGNOSIS — E11.9 TYPE 2 DIABETES MELLITUS WITHOUT COMPLICATION, WITHOUT LONG-TERM CURRENT USE OF INSULIN: ICD-10-CM

## 2024-12-18 DIAGNOSIS — E78.00 PURE HYPERCHOLESTEROLEMIA: Primary | ICD-10-CM

## 2024-12-18 PROCEDURE — 1126F AMNT PAIN NOTED NONE PRSNT: CPT | Performed by: FAMILY MEDICINE

## 2024-12-18 PROCEDURE — 99214 OFFICE O/P EST MOD 30 MIN: CPT | Performed by: FAMILY MEDICINE

## 2024-12-18 PROCEDURE — G2211 COMPLEX E/M VISIT ADD ON: HCPCS | Performed by: FAMILY MEDICINE

## 2024-12-18 PROCEDURE — 3044F HG A1C LEVEL LT 7.0%: CPT | Performed by: FAMILY MEDICINE

## 2024-12-18 RX ORDER — TADALAFIL 20 MG/1
20 TABLET ORAL DAILY PRN
Qty: 30 TABLET | Refills: 1 | Status: SHIPPED | OUTPATIENT
Start: 2024-12-18

## 2024-12-18 RX ORDER — METFORMIN HYDROCHLORIDE 500 MG/1
2000 TABLET, EXTENDED RELEASE ORAL
Qty: 360 TABLET | Refills: 2 | Status: SHIPPED | OUTPATIENT
Start: 2024-12-18 | End: 2025-09-14

## 2024-12-18 RX ORDER — ATORVASTATIN CALCIUM 40 MG/1
40 TABLET, FILM COATED ORAL
Qty: 90 TABLET | Refills: 2 | Status: SHIPPED | OUTPATIENT
Start: 2024-12-18 | End: 2025-09-14

## 2024-12-18 RX ORDER — BUPROPION HYDROCHLORIDE 300 MG/1
300 TABLET ORAL
Qty: 90 TABLET | Refills: 2 | Status: SHIPPED | OUTPATIENT
Start: 2024-12-18 | End: 2025-09-14

## 2024-12-18 RX ORDER — OMEPRAZOLE 40 MG/1
40 CAPSULE, DELAYED RELEASE ORAL DAILY
Qty: 90 CAPSULE | Refills: 2 | Status: SHIPPED | OUTPATIENT
Start: 2024-12-18

## 2024-12-18 NOTE — ASSESSMENT & PLAN NOTE
Condition is stable. The current medical regimen is effective;  continue present plan and medications.  Orders:    omeprazole (priLOSEC) 40 MG capsule; Take 1 capsule by mouth Daily.    CBC & Differential; Future

## 2024-12-18 NOTE — ASSESSMENT & PLAN NOTE
Condition is stable. The current medical regimen is effective;  continue present plan and medications.  Orders:    buPROPion XL (WELLBUTRIN XL) 300 MG 24 hr tablet; Take 1 tablet by mouth every night at bedtime for 270 days.

## 2024-12-18 NOTE — PROGRESS NOTES
"Chief Complaint  Follow-up (6 month), Diabetes, Hyperlipidemia, Anxiety, and ED    Subjective        HPI      The patient presents for a routine visit.    He adheres to his prescribed medications: atorvastatin, bupropion, metformin, and omeprazole. He reports a positive response to bupropion for anxiety.    He has reduced his intake of sweets after previously consuming excessive cake during several birthday celebrations.    He continues to use Linzess for bowel regulation and Allegra as needed for allergies. He also takes baby aspirin.    MEDICATIONS  - Atorvastatin  - Bupropion  - Metformin  - Omeprazole  - Tadalafil  - Linzess  - Allegra  - Baby aspirin           Vital Signs:   Vitals:    12/18/24 1329   BP: 126/68   Pulse: 88   SpO2: 99%   Weight: 72.1 kg (159 lb)   Height: 182.9 cm (72\")            6/17/2024    12:59 PM   PHQ-2/PHQ-9 Depression Screening   Retired Little Interest or Pleasure in Doing Things 0-->not at all   Retired Feeling Down, Depressed or Hopeless 0-->not at all   Retired PHQ-9: Brief Depression Severity Measure Score 0       BMI is within normal parameters. No other follow-up for BMI required.        Physical Exam     General Appearance: No Acute Distress, normal appearance, well developed, well nourished.  Vital signs: Vital Signs reviewed and are within normal limits.  Respiratory: Lungs auscultated-normal.  Cardiovascular: Regular rate and ryhthym with no murmurs, rubs, or gallop. No carotid bruits auscultated bilaterally.  Extremities: Lower extremities examined.  Skin: Warm and dry, no rash.  Psychiatric: patient cooperative, normal affect.       The following data was reviewed by: Dylan Paulson MD on 12/18/2024:      Results  - Laboratory Studies:    - Sodium: 141    - Potassium: 4.4    - Chloride: 104    - Calcium: 9.7    - BUN: 15    - Creatinine: 0.99    - eGFR: 81.9    - Glucose: 104    - Hemoglobin A1c: 5.9    - Total cholesterol: 129    - HDL: 52    - LDL: 58    - Triglycerides: " 100    - WBC: 6.48    - Hemoglobin: 13.3    - Microalbumin: 3.5                Assessment and Plan        Pure hypercholesterolemia     Pure hypercholesterolemia is much improved.  Continue same medicine and lifestyle changes.  Orders:    atorvastatin (LIPITOR) 40 MG tablet; Take 1 tablet by mouth every night at bedtime for 270 days.    Comprehensive Metabolic Panel; Future    CBC & Differential; Future    Lipid Panel; Future    CK; Future    Anxiety  Condition is stable. The current medical regimen is effective;  continue present plan and medications.  Orders:    buPROPion XL (WELLBUTRIN XL) 300 MG 24 hr tablet; Take 1 tablet by mouth every night at bedtime for 270 days.    Type 2 diabetes mellitus without complication, without long-term current use of insulin    Interval improvement of diabetes noted.  Continue same medication.  Orders:    metFORMIN ER (GLUCOPHAGE-XR) 500 MG 24 hr tablet; Take 4 tablets by mouth Daily With Dinner for 270 days.    Comprehensive Metabolic Panel; Future    Hemoglobin A1c; Future    MicroAlbumin, Urine, Random - Urine, Clean Catch; Future    Gastroesophageal reflux disease with esophagitis without hemorrhage  Condition is stable. The current medical regimen is effective;  continue present plan and medications.  Orders:    omeprazole (priLOSEC) 40 MG capsule; Take 1 capsule by mouth Daily.    CBC & Differential; Future    Lo's esophagus without dysplasia  Condition is stable. The current medical regimen is effective;  continue present plan and medications.  Orders:    omeprazole (priLOSEC) 40 MG capsule; Take 1 capsule by mouth Daily.    CBC & Differential; Future    Erectile dysfunction, unspecified erectile dysfunction type  Condition is stable. The current medical regimen is effective;  continue present plan and medications.  Orders:    tadalafil (CIALIS) 20 MG tablet; Take 1 tablet by mouth Daily As Needed for Erectile Dysfunction.         Return in about 6 months (around  6/18/2025) for Medicare Wellness & regular visit, [id number] min.     Patient was given instructions and counseling regarding his condition or for health maintenance advice. Please see specific information pulled into the AVS if appropriate.     Patient or patient representative verbalized consent for the use of Ambient Listening during the visit with  Dylan Paulson MD for chart documentation. 12/18/2024  13:48 EST

## 2024-12-18 NOTE — ASSESSMENT & PLAN NOTE
Condition is stable. The current medical regimen is effective;  continue present plan and medications.  Orders:    tadalafil (CIALIS) 20 MG tablet; Take 1 tablet by mouth Daily As Needed for Erectile Dysfunction.

## 2024-12-18 NOTE — ASSESSMENT & PLAN NOTE
Interval improvement of diabetes noted.  Continue same medication.  Orders:    metFORMIN ER (GLUCOPHAGE-XR) 500 MG 24 hr tablet; Take 4 tablets by mouth Daily With Dinner for 270 days.    Comprehensive Metabolic Panel; Future    Hemoglobin A1c; Future    MicroAlbumin, Urine, Random - Urine, Clean Catch; Future

## 2024-12-18 NOTE — ASSESSMENT & PLAN NOTE
Pure hypercholesterolemia is much improved.  Continue same medicine and lifestyle changes.  Orders:    atorvastatin (LIPITOR) 40 MG tablet; Take 1 tablet by mouth every night at bedtime for 270 days.    Comprehensive Metabolic Panel; Future    CBC & Differential; Future    Lipid Panel; Future    CK; Future

## 2025-01-27 DIAGNOSIS — K59.01 SLOW TRANSIT CONSTIPATION: ICD-10-CM

## 2025-01-27 DIAGNOSIS — N52.9 ERECTILE DYSFUNCTION, UNSPECIFIED ERECTILE DYSFUNCTION TYPE: ICD-10-CM

## 2025-01-27 RX ORDER — TADALAFIL 20 MG/1
20 TABLET ORAL DAILY PRN
Qty: 30 TABLET | Refills: 1 | Status: SHIPPED | OUTPATIENT
Start: 2025-01-27

## 2025-01-27 RX ORDER — LINACLOTIDE 145 UG/1
145 CAPSULE, GELATIN COATED ORAL
Qty: 30 CAPSULE | Refills: 6 | OUTPATIENT
Start: 2025-01-27

## 2025-04-30 ENCOUNTER — READMISSION MANAGEMENT (OUTPATIENT)
Dept: CALL CENTER | Facility: HOSPITAL | Age: 70
End: 2025-04-30
Payer: MEDICARE

## 2025-04-30 NOTE — OUTREACH NOTE
Prep Survey      Flowsheet Row Responses   [facility] facility patient discharged from? Non-[facility]   Is LACE score < 7 ? Non-BH Discharge   Eligibility [facility]   Date of Admission 04/27/25   Date of Discharge 04/30/25   Discharge Disposition Home or Self Care   Discharge diagnosis Lower GI bleed (Primary Dx),  Orthostatic hypotension,  Anemia, unspecified type,  Gastrointestinal hemorrhage associated with intestinal diverticulosis   Does the patient have one of the following disease processes/diagnoses(primary or secondary)? Other   Prep survey completed? Yes            Dahlia Coleman Registered Nurse

## 2025-05-01 ENCOUNTER — TRANSITIONAL CARE MANAGEMENT TELEPHONE ENCOUNTER (OUTPATIENT)
Dept: CALL CENTER | Facility: HOSPITAL | Age: 70
End: 2025-05-01
Payer: MEDICARE

## 2025-05-01 NOTE — OUTREACH NOTE
Call Center TCM Note      Flowsheet Row Responses   [facility] patient discharged from? Non-  [[facility]]   Does the patient have one of the following disease processes/diagnoses(primary or secondary)? Other   TCM attempt successful? Yes   Call start time 1656   Call end time 1658   Discharge diagnosis Lower GI bleed (Primary Dx),  Orthostatic hypotension,  Anemia, unspecified type,  Gastrointestinal hemorrhage associated with intestinal diverticulosis   Person spoke with today (if not patient) and relationship Patient   Meds reviewed with patient/caregiver? Yes   Is the patient having any side effects they believe may be caused by any medication additions or changes? No   Does the patient have all medications ordered at discharge? Yes   Is the patient taking all medications as directed (includes completed medication regime)? Yes   Comments Could not find availability with Dr. Paulson. Message sent to PCP office to see if they could contact patient with availability within TCM timeframe. Patient was discharged from [facility].   Does the patient have an appointment with their PCP within 7-14 days of discharge? Other   Nursing Interventions Routed TCM call to PCP office, PCP office requested to make appointment - message sent   Psychosocial issues? No   Did the patient receive a copy of their discharge instructions? Yes   Nursing interventions Reviewed instructions with patient   What is the patient's perception of their health status since discharge? Improving   Is the patient/caregiver able to teach back signs and symptoms related to disease process for when to call PCP? Yes   Is the patient/caregiver able to teach back signs and symptoms related to disease process for when to call 911? Yes   Is the patient/caregiver able to teach back the hierarchy of who to call/visit for symptoms/problems? PCP, Specialist, Home health nurse, Urgent Care, ED, 911 Yes   If the patient is a current smoker, are they able to teach  back resources for cessation? Not a smoker   TCM call completed? Yes   Wrap up additional comments Patient  is weak but doing better.   Call end time 1658   Would this patient benefit from a Referral to [facility] Social Work? No   Is the patient interested in additional calls from an ambulatory ? No            Donald JOVEL - Registered Nurse    5/1/2025, 16:59 EDT

## 2025-05-02 ENCOUNTER — TELEPHONE (OUTPATIENT)
Dept: GASTROENTEROLOGY | Facility: CLINIC | Age: 70
End: 2025-05-02
Payer: MEDICARE

## 2025-05-02 NOTE — TELEPHONE ENCOUNTER
Returned patient's phone call. He states he was at [facility] for a diverticular bleed. He states the earliest appointment he could get was 7/14. He questions if he needs to be seen sooner? If not what can he do between now and his appointment? Advised will send Conchita an update. He verb understanding.

## 2025-05-02 NOTE — TELEPHONE ENCOUNTER
"        Hub staff attempted to follow warm transfer process and was unsuccessful     Caller: Dylan Mosher P \"Yaakov\"    Relationship to patient: Self    Best call back number: [phone]      Patient is needing: PT HAS AN APPT 7-14-25, WOULD LIKE TO BE SEEN SOONER, WAS IN THE ER FOR DIVERTICULITIS FLARE UP OR WHAT TO DO BEFORE APPT. PLS CALL PT TO DISCUSS.         "

## 2025-05-04 DIAGNOSIS — K22.70 BARRETT'S ESOPHAGUS WITHOUT DYSPLASIA: ICD-10-CM

## 2025-05-04 DIAGNOSIS — K21.00 GASTROESOPHAGEAL REFLUX DISEASE WITH ESOPHAGITIS WITHOUT HEMORRHAGE: ICD-10-CM

## 2025-05-04 DIAGNOSIS — R13.19 ESOPHAGEAL DYSPHAGIA: ICD-10-CM

## 2025-05-04 DIAGNOSIS — K59.01 SLOW TRANSIT CONSTIPATION: ICD-10-CM

## 2025-05-06 RX ORDER — LINACLOTIDE 72 UG/1
72 CAPSULE, GELATIN COATED ORAL
Qty: 30 CAPSULE | Refills: 11 | Status: SHIPPED | OUTPATIENT
Start: 2025-05-06

## 2025-05-08 ENCOUNTER — OFFICE VISIT (OUTPATIENT)
Dept: FAMILY MEDICINE CLINIC | Facility: CLINIC | Age: 70
End: 2025-05-08
Payer: MEDICARE

## 2025-05-08 VITALS
TEMPERATURE: 97.6 F | BODY MASS INDEX: 20.51 KG/M2 | WEIGHT: 151.4 LBS | OXYGEN SATURATION: 100 % | DIASTOLIC BLOOD PRESSURE: 58 MMHG | HEART RATE: 73 BPM | HEIGHT: 72 IN | SYSTOLIC BLOOD PRESSURE: 100 MMHG

## 2025-05-08 DIAGNOSIS — D62 ANEMIA DUE TO ACUTE BLOOD LOSS: ICD-10-CM

## 2025-05-08 DIAGNOSIS — K92.2 ACUTE LOWER GI BLEEDING: ICD-10-CM

## 2025-05-08 DIAGNOSIS — Z09 HOSPITAL DISCHARGE FOLLOW-UP: Primary | ICD-10-CM

## 2025-05-08 RX ORDER — FERROUS SULFATE 325(65) MG
325 TABLET ORAL
COMMUNITY
Start: 2025-05-08

## 2025-05-08 NOTE — ASSESSMENT & PLAN NOTE
No further episodes of lower GI bleeding reported during today's visit  Orders:    Comprehensive Metabolic Panel; Future    CBC & Differential; Future    ferrous sulfate 325 (65 FE) MG tablet; Take 1 tablet by mouth Daily With Dinner.

## 2025-05-08 NOTE — ASSESSMENT & PLAN NOTE
Anemia due to acute blood loss was documented during hospital stay.  We will get repeat labs.  I have asked him to start taking ferrous sulfate 325 mg daily with dinner.  Orders:    Comprehensive Metabolic Panel; Future    Iron Profile; Future    CBC & Differential; Future    Methylmalonic Acid, Serum; Future    Folate; Future    Vitamin B12; Future    Ferritin; Future    ferrous sulfate 325 (65 FE) MG tablet; Take 1 tablet by mouth Daily With Dinner.

## 2025-05-08 NOTE — PATIENT INSTRUCTIONS
Carbohydrate Counting for Diabetes Mellitus, Adult  Carbohydrate counting is a method of keeping track of how many carbohydrates you eat. Eating carbohydrates increases the amount of sugar (glucose) in the blood. Counting how many carbohydrates you eat improves how well you manage your blood glucose. This, in turn, helps you manage your diabetes.  Carbohydrates are measured in grams (g) per serving. It is important to know how many carbohydrates (in grams or by serving size) you can have in each meal. This is different for every person. A dietitian can help you make a meal plan and calculate how many carbohydrates you should have at each meal and snack.  What foods contain carbohydrates?  Carbohydrates are found in the following foods:  Grains, such as breads and cereals.  Dried beans and soy products.  Starchy vegetables, such as potatoes, peas, and corn.  Fruit and fruit juices.  Milk and yogurt.  Sweets and snack foods, such as cake, cookies, candy, chips, and soft drinks.  How do I count carbohydrates in foods?  There are two ways to count carbohydrates in food. You can read food labels or learn standard serving sizes of foods. You can use either of these methods or a combination of both.  Using the Nutrition Facts label  The Nutrition Facts list is included on the labels of almost all packaged foods and beverages in the [place]. It includes:  The serving size.  Information about nutrients in each serving, including the grams of carbohydrate per serving.  To use the Nutrition Facts, decide how many servings you will have. Then, multiply the number of servings by the number of carbohydrates per serving. The resulting number is the total grams of carbohydrates that you will be having.  Learning the standard serving sizes of foods  When you eat carbohydrate foods that are not packaged or do not include Nutrition Facts on the label, you need to measure the servings in order to count the grams of  carbohydrates.  Measure the foods that you will eat with a food scale or measuring cup, if needed.  Decide how many standard-size servings you will eat.  Multiply the number of servings by 15. For foods that contain carbohydrates, one serving equals 15 g of carbohydrates.  For example, if you eat 2 cups or 10 oz (300 g) of strawberries, you will have eaten 2 servings and 30 g of carbohydrates (2 servings x 15 g = 30 g).  For foods that have more than one food mixed, such as soups and casseroles, you must count the carbohydrates in each food that is included.  The following list contains standard serving sizes of common carbohydrate-rich foods. Each of these servings has about 15 g of carbohydrates:  1 slice of bread.  1 six-inch (15 cm) tortilla.  ? cup or 2 oz (53 g) cooked rice or pasta.  ½ cup or 3 oz (85 g) cooked or canned, drained and rinsed beans or lentils.  ½ cup or 3 oz (85 g) starchy vegetable, such as peas, corn, or squash.  ½ cup or 4 oz (120 g) hot cereal.  ½ cup or 3 oz (85 g) boiled or mashed potatoes, or ¼ or 3 oz (85 g) of a large baked potato.  ½ cup or 4 fl oz (118 mL) fruit juice.  1 cup or 8 fl oz (237 mL) milk.  1 small or 4 oz (106 g) apple.  ½ or 2 oz (63 g) of a medium banana.  1 cup or 5 oz (150 g) strawberries.  3 cups or 1 oz (28.3 g) popped popcorn.  What is an example of carbohydrate counting?  To calculate the grams of carbohydrates in this sample meal, follow the steps shown below.  Sample meal  3 oz (85 g) chicken breast.  ? cup or 4 oz (106 g) brown rice.  ½ cup or 3 oz (85 g) corn.  1 cup or 8 fl oz (237 mL) milk.  1 cup or 5 oz (150 g) strawberries with sugar-free whipped topping.  Carbohydrate calculation  Identify the foods that contain carbohydrates:  Rice.  Corn.  Milk.  Strawberries.  Calculate how many servings you have of each food:  2 servings rice.  1 serving corn.  1 serving milk.  1 serving strawberries.  Multiply each number of servings by 15  servings rice x 15  g = 30 g.  1 serving corn x 15 g = 15 g.  1 serving milk x 15 g = 15 g.  1 serving strawberries x 15 g = 15 g.  Add together all of the amounts to find the total grams of carbohydrates eaten:  30 g + 15 g + 15 g + 15 g = 75 g of carbohydrates total.  What are tips for following this plan?  Shopping  Develop a meal plan and then make a shopping list.  Buy fresh and frozen vegetables, fresh and frozen fruit, dairy, eggs, beans, lentils, and whole grains.  Look at food labels. Choose foods that have more fiber and less sugar.  Avoid processed foods and foods with added sugars.  Meal planning  Aim to have the same number of grams of carbohydrates at each meal and for each snack time.  Plan to have regular, balanced meals and snacks.  Where to find more information  American Diabetes Association: diabetes.org  Centers for Disease Control and Prevention: cdc.gov  Academy of Nutrition and Dietetics: eatright.org  Association of Diabetes Care & Education Specialists: diabeteseducator.org  Summary  Carbohydrate counting is a method of keeping track of how many carbohydrates you eat.  Eating carbohydrates increases the amount of sugar (glucose) in your blood.  Counting how many carbohydrates you eat improves how well you manage your blood glucose. This helps you manage your diabetes.  A dietitian can help you make a meal plan and calculate how many carbohydrates you should have at each meal and snack.  This information is not intended to replace advice given to you by your health care provider. Make sure you discuss any questions you have with your health care provider.  Document Revised: 07/20/2021 Document Reviewed: 07/21/2021  Elsevier Patient Education © 2024 [employer]

## 2025-05-08 NOTE — PROGRESS NOTES
Transitional Care Follow Up Visit  Subjective     CC: Transitional Care Management Visit        Within 48 business hours after discharge our office contacted him via telephone to coordinate his care and needs.      I reviewed and discussed the details of that call along with the discharge summary, hospital problems, inpatient lab results, inpatient diagnostic studies, and consultation reports with Dylan.     Current outpatient and discharge medications have been reconciled for the patient.  Reviewed by: Dylan Paulson MD          4/30/2025     5:26 PM   Date of TCM Phone Call   [facility]   Date of Admission 4/27/2025   Date of Discharge 4/30/2025   Discharge Disposition Home or Self Care       Risk for Readmission (LACE) No data recorded    HPI     The patient presents for evaluation of lower GI bleed.    The patient was hospitalized for lower GI bleed on 04/27/2025, causing dizziness. The etiology was identified as diverticulosis or a ruptured diverticulum. The patient had a similar episode in 2019, and aspirin therapy was discontinued. During the hospitalization, the patient received one unit of blood and had no prior symptoms. The patient was advised to increase meat intake, and no iron supplements were prescribed. Bowel movements have been normal, with no black tarry stool. A follow-up with Dr. Jasvir Puckett is scheduled for 07/14/2025. A colonoscopy was performed two years ago, and no further action was recommended. The patient was placed on a liquid diet during the hospital stay. Post-discharge, the patient experienced mild weakness and a slight decrease in blood pressure today. The patient continues to take Linzess and omeprazole and has resumed metformin.    MEDICATIONS  Current: metformin, Linzess, omeprazole     Course During Hospital Stay       The following portions of the patient's history were reviewed and updated as appropriate: allergies, current medications, past family history,  "past medical history, past social history, past surgical history, and problem list.     Vitals:    05/08/25 1459   BP: 100/58   BP Location: Right arm   Patient Position: Sitting   Cuff Size: Adult   Pulse: 73   Temp: 97.6 °F (36.4 °C)   TempSrc: Oral   SpO2: 100%   Weight: 68.7 kg (151 lb 6.4 oz)   Height: 182.9 cm (72\")   PainSc: 0-No pain             Objective   Physical Exam      General Appearance: Normal appearance, No acute distress.  HEENT: Slightly pale palpebral conjunctivae.  Respiratory: No respiratory distress, lungs clear to auscultation with no wheezes or rubs.  Cardiovascular: regular rate and rhythm with no murmurs, rubs, or gallop.  Extremities: No pretibial edema bilaterally.  Psychiatric: normal affect, pleasant, cooperative with exam.  Other observations: no carotid bruits auscultated.     DATA REVIEWED:  The following data was reviewed by: Dylan Paulson MD on 05/08/2025:    Results  - Laboratory Studies:    - Hemoglobin: 9.1 (04/30/2025)       Assessment & Plan  Hospital discharge follow-up  Patient is doing well posthospital discharge.  He does have follow-up with gastroenterology within the next 2 months.  He does have expected fatigue.       Acute lower GI bleeding  No further episodes of lower GI bleeding reported during today's visit  Orders:    Comprehensive Metabolic Panel; Future    CBC & Differential; Future    ferrous sulfate 325 (65 FE) MG tablet; Take 1 tablet by mouth Daily With Dinner.    Anemia due to acute blood loss  Anemia due to acute blood loss was documented during hospital stay.  We will get repeat labs.  I have asked him to start taking ferrous sulfate 325 mg daily with dinner.  Orders:    Comprehensive Metabolic Panel; Future    Iron Profile; Future    CBC & Differential; Future    Methylmalonic Acid, Serum; Future    Folate; Future    Vitamin B12; Future    Ferritin; Future    ferrous sulfate 325 (65 FE) MG tablet; Take 1 tablet by mouth Daily With Dinner.       Follow " Up     Return in about 2 months (around 7/8/2025) for Medicare Wellness & regular visit, [id number] min.        Patient or patient representative verbalized consent for the use of Ambient Listening during the visit with  Dylan Paulson MD for chart documentation. 5/8/2025  15:34 EDT

## 2025-07-14 ENCOUNTER — OFFICE VISIT (OUTPATIENT)
Dept: GASTROENTEROLOGY | Facility: CLINIC | Age: 70
End: 2025-07-14
Payer: MEDICARE

## 2025-07-14 VITALS
SYSTOLIC BLOOD PRESSURE: 117 MMHG | BODY MASS INDEX: 20.93 KG/M2 | TEMPERATURE: 97.1 F | HEART RATE: 82 BPM | WEIGHT: 154.5 LBS | HEIGHT: 72 IN | DIASTOLIC BLOOD PRESSURE: 73 MMHG

## 2025-07-14 DIAGNOSIS — K21.00 GASTROESOPHAGEAL REFLUX DISEASE WITH ESOPHAGITIS WITHOUT HEMORRHAGE: ICD-10-CM

## 2025-07-14 DIAGNOSIS — K22.70 BARRETT'S ESOPHAGUS WITHOUT DYSPLASIA: Primary | ICD-10-CM

## 2025-07-14 DIAGNOSIS — K59.01 SLOW TRANSIT CONSTIPATION: ICD-10-CM

## 2025-07-14 DIAGNOSIS — Z87.19 HISTORY OF GI DIVERTICULAR BLEED: ICD-10-CM

## 2025-07-14 PROCEDURE — 99214 OFFICE O/P EST MOD 30 MIN: CPT | Performed by: PHYSICIAN ASSISTANT

## 2025-07-14 PROCEDURE — 1159F MED LIST DOCD IN RCRD: CPT | Performed by: PHYSICIAN ASSISTANT

## 2025-07-14 PROCEDURE — 1160F RVW MEDS BY RX/DR IN RCRD: CPT | Performed by: PHYSICIAN ASSISTANT

## 2025-07-14 NOTE — PROGRESS NOTES
"Chief Complaint  Constipation, Diarrhea, and Difficulty Swallowing    Subjective          History Of Present Illness:    Dylan Mosher is a  69 y.o. male patient of Dr. Parks with a history of Lo's esophagus who presents as a follow-up for GERD and constipation.     Patient recently at [facility] on 4/28/2025 with hematochezia.  Suspected diverticular bleed that resolved spontaneously.    Patient denies any further hematochezia.  No melena.  He denies abdominal pain, nausea, vomiting.  He continues to take Linzess 72 mcg every other day.  He does have some instances of constipation and diarrhea but not regularly.  No weight loss or poor appetite.  Patient does still have some issues with oropharyngeal dysphagia.  No nausea, vomiting, or regurgitation of food.  No weight loss or poor appetite.  He did see SLP back in September of last year for dysphagia.  Patient was given exercises but has not continued these regularly.    Additional data reviewed:  C-scope by Dr. Whittington 9/26/19 -normal ileum, colon.  No specimens collected.  Recall 10 years.  EGD 4/8/24 -1 cm hiatal hernia, irregular Z-line, mild Schatzki's ring status post dilation, normal stomach and duodenum.  Pathology consistent with Lo's esophagus. Recall 3 years.     FL Video Swallow With Speech Single Contrast (09/12/2024 14:06) - Patient presents with mild oropharyngeal dysphagia characterized by reduced epiglottic deflection. Cervical osteophytes impacting epiglottic deflection. No penetration or aspiration with thin via cup/straw, pur?e, mechanical soft, and regular textures. Mild pharyngeal/vallecular residue status post trials. Patient demonstrated piecemeal deglutition with premature spillage to the pyriforms with mechanical soft.    Objective   Vital Signs:   /73   Pulse 82   Temp 97.1 °F (36.2 °C)   Ht 182.9 cm (72\")   Wt 70.1 kg (154 lb 8 oz)   BMI 20.95 kg/m²       Physical Exam  Vitals reviewed.   Constitutional:       " General: He is not in acute distress.     Appearance: Normal appearance. He is not ill-appearing.   HENT:      Head: Normocephalic and atraumatic.      Nose: Nose normal.      Mouth/Throat:      Pharynx: Oropharynx is clear.   Eyes:      Conjunctiva/sclera: Conjunctivae normal.   Pulmonary:      Effort: Pulmonary effort is normal.   Abdominal:      General: There is no distension.      Palpations: Abdomen is soft. There is no mass.      Tenderness: There is no abdominal tenderness.   Musculoskeletal:         General: No swelling. Normal range of motion.      Cervical back: Normal range of motion.   Skin:     General: Skin is warm and dry.      Findings: No bruising or rash.   Neurological:      General: No focal deficit present.      Mental Status: He is alert and oriented to person, place, and time.      Motor: No weakness.      Gait: Gait normal.   Psychiatric:         Mood and Affect: Mood normal.          Result Review :   The following data was reviewed by: Anne Lamb PA-C on 07/14/2025:  CMP          12/11/2024    08:15   CMP   Glucose 104    BUN 15    Creatinine 0.99    EGFR 81.9    Sodium 141    Potassium 4.4    Chloride 104    Calcium 9.7    Total Protein 7.1    Albumin 4.3    Globulin 2.8    Total Bilirubin 0.2    Alkaline Phosphatase 106    AST (SGOT) 26    ALT (SGPT) 36    Albumin/Globulin Ratio 1.5    BUN/Creatinine Ratio 15.2      CBC          12/11/2024    08:15   CBC   WBC 6.48    RBC 4.48    Hemoglobin 13.3    Hematocrit 41.8    MCV 93.3    MCH 29.7    MCHC 31.8    RDW 12.6    Platelets 265            Assessment and Plan    Diagnoses and all orders for this visit:    1. Lo's esophagus without dysplasia (Primary)    2. Gastroesophageal reflux disease with esophagitis without hemorrhage    3. Slow transit constipation    4. History of GI diverticular bleed       Continue omeprazole 40 mg daily  Continue Linzess 72 mcg every other day.  Patient has been on iron supplements since his  hospitalization in April.  Patient appears to have had CBC and iron studies ordered but unfortunately were not collected at that time.  Will have these collected today to follow-up since being on iron therapy.  Patient has not had any overt bleeding since then which is reassuring.    Follow Up   Return in about 1 year (around 7/14/2026) for Anne Puckett PA-C.    Dragon dictation used throughout this note.            Anen Puckett PA-C   [facility]  [address] [place] [address]  Office: [phone]

## 2025-07-15 RX ORDER — TADALAFIL 20 MG/1
20 TABLET ORAL DAILY PRN
Qty: 30 TABLET | Refills: 1 | Status: CANCELLED | OUTPATIENT
Start: 2025-07-15

## 2025-07-16 ENCOUNTER — OFFICE VISIT (OUTPATIENT)
Dept: FAMILY MEDICINE CLINIC | Facility: CLINIC | Age: 70
End: 2025-07-16
Payer: MEDICARE

## 2025-07-16 VITALS
SYSTOLIC BLOOD PRESSURE: 116 MMHG | HEART RATE: 84 BPM | HEIGHT: 72 IN | OXYGEN SATURATION: 100 % | WEIGHT: 154 LBS | DIASTOLIC BLOOD PRESSURE: 74 MMHG | BODY MASS INDEX: 20.86 KG/M2

## 2025-07-16 DIAGNOSIS — Z23 IMMUNIZATION DUE: ICD-10-CM

## 2025-07-16 DIAGNOSIS — K22.70 BARRETT'S ESOPHAGUS WITHOUT DYSPLASIA: ICD-10-CM

## 2025-07-16 DIAGNOSIS — E11.9 TYPE 2 DIABETES MELLITUS WITHOUT COMPLICATION, WITHOUT LONG-TERM CURRENT USE OF INSULIN: ICD-10-CM

## 2025-07-16 DIAGNOSIS — E78.00 PURE HYPERCHOLESTEROLEMIA: ICD-10-CM

## 2025-07-16 DIAGNOSIS — Z00.00 MEDICARE ANNUAL WELLNESS VISIT, SUBSEQUENT: Primary | ICD-10-CM

## 2025-07-16 DIAGNOSIS — I70.0 AORTIC ATHEROSCLEROSIS: ICD-10-CM

## 2025-07-16 DIAGNOSIS — I25.10 CORONARY ARTERY DISEASE INVOLVING NATIVE CORONARY ARTERY OF NATIVE HEART WITHOUT ANGINA PECTORIS: ICD-10-CM

## 2025-07-16 DIAGNOSIS — Z87.898 HISTORY OF SNORING: ICD-10-CM

## 2025-07-16 DIAGNOSIS — R35.1 NOCTURIA: ICD-10-CM

## 2025-07-16 DIAGNOSIS — F41.9 ANXIETY: ICD-10-CM

## 2025-07-16 DIAGNOSIS — R40.0 DAYTIME SOMNOLENCE: ICD-10-CM

## 2025-07-16 DIAGNOSIS — K21.00 GASTROESOPHAGEAL REFLUX DISEASE WITH ESOPHAGITIS WITHOUT HEMORRHAGE: ICD-10-CM

## 2025-07-16 RX ORDER — BUPROPION HYDROCHLORIDE 300 MG/1
300 TABLET ORAL
Qty: 90 TABLET | Refills: 2 | Status: SHIPPED | OUTPATIENT
Start: 2025-07-16 | End: 2026-04-12

## 2025-07-16 RX ORDER — METFORMIN HYDROCHLORIDE 500 MG/1
2000 TABLET, EXTENDED RELEASE ORAL
Qty: 360 TABLET | Refills: 2 | Status: SHIPPED | OUTPATIENT
Start: 2025-07-16 | End: 2026-04-12

## 2025-07-16 RX ORDER — OMEPRAZOLE 40 MG/1
40 CAPSULE, DELAYED RELEASE ORAL DAILY
Qty: 90 CAPSULE | Refills: 2 | Status: SHIPPED | OUTPATIENT
Start: 2025-07-16

## 2025-07-16 RX ORDER — ATORVASTATIN CALCIUM 40 MG/1
40 TABLET, FILM COATED ORAL
Qty: 90 TABLET | Refills: 2 | Status: SHIPPED | OUTPATIENT
Start: 2025-07-16 | End: 2026-04-12

## 2025-07-16 NOTE — ASSESSMENT & PLAN NOTE
{Hyperlipidemia A/P Block (Optional):[id number]}  Pure hypercholesterolemia is well-controlled.  Continue with atorvastatin.  Continue with heart healthy diet.  Mediterranean diet shared in the after visit summary.  Continue with regular exercise  Orders:    atorvastatin (LIPITOR) 40 MG tablet; Take 1 tablet by mouth every night at bedtime for 270 days.    Comprehensive Metabolic Panel; Future    CBC & Differential; Future    CK; Future    Lipid Panel With LDL / HDL Ratio; Future

## 2025-07-16 NOTE — ASSESSMENT & PLAN NOTE
{Diabetes (Optional):[id number]}  Type 2 diabetes is well-controlled.  Continue with metformin as prescribed.  Labs again in 6 months  Orders:    metFORMIN ER (GLUCOPHAGE-XR) 500 MG 24 hr tablet; Take 4 tablets by mouth Daily With Dinner for 270 days.    Comprehensive Metabolic Panel; Future    Hemoglobin A1c; Future    MicroAlbumin, Urine, Random - Urine, Clean Catch; Future    Microalbumin / Creatinine Urine Ratio - Urine, Clean Catch; Future

## 2025-07-16 NOTE — ASSESSMENT & PLAN NOTE
Continue with statin therapy.  Recheck labs again in 6 months  Orders:    Lipid Panel With LDL / HDL Ratio; Future

## 2025-07-16 NOTE — PROGRESS NOTES
Subjective   The ABCs of the Annual Wellness Visit  Medicare Wellness Visit      Dylan Mosher is a 69 y.o. patient who presents for a Medicare Wellness Visit.    The following portions of the patient's history were reviewed and   updated as appropriate: allergies, current medications, past family history, past medical history, past social history, past surgical history, and problem list.    Compared to one year ago, the patient's physical   health is the same.  Compared to one year ago, the patient's mental   health is the same.    Recent Hospitalizations:  He was not admitted to the hospital during the last year.     Current Medical Providers:  Patient Care Team:  Dylan Paulson MD as PCP - General (Family Medicine)  Atilio Cantrell MD as Consulting Physician (Ophthalmology)  Anne Puckett PA-C as Physician Assistant (Physician Assistant)    Outpatient Medications Prior to Visit   Medication Sig Dispense Refill    ferrous sulfate 325 (65 FE) MG tablet Take 1 tablet by mouth Daily With Dinner.      fexofenadine (ALLEGRA) 180 MG tablet Take 1 tablet by mouth Daily.      Linzess 72 MCG capsule capsule TAKE 1 CAPSULE BY MOUTH EVERY MORNING BEFORE BREAKFAST 30 capsule 11    Multiple Vitamins-Minerals (multivitamin with minerals) tablet tablet Take 1 tablet by mouth Daily.      tadalafil (CIALIS) 20 MG tablet TAKE 1 TABLET BY MOUTH EVERY DAY AS NEEDED 30 tablet 1    cephalexin (KEFLEX) 500 MG capsule 2 PO BID 28 capsule 0    atorvastatin (LIPITOR) 40 MG tablet Take 1 tablet by mouth every night at bedtime for 270 days. 90 tablet 2    buPROPion XL (WELLBUTRIN XL) 300 MG 24 hr tablet Take 1 tablet by mouth every night at bedtime for 270 days. 90 tablet 2    metFORMIN ER (GLUCOPHAGE-XR) 500 MG 24 hr tablet Take 4 tablets by mouth Daily With Dinner for 270 days. 360 tablet 2    omeprazole (priLOSEC) 40 MG capsule Take 1 capsule by mouth Daily. 90 capsule 2     No facility-administered medications prior to  "visit.     No opioid medication identified on active medication list. I have reviewed chart for other potential  high risk medication/s and harmful drug interactions in the elderly.      Aspirin is not on active medication list.  Aspirin use is contraindicated for this patient due to: history of aspirin allergy.  .    Patient Active Problem List   Diagnosis    ED (erectile dysfunction)    Anxiety    Colon polyp    Pure hypercholesterolemia    Sleep apnea    History of diverticulosis with bleeding    Type 2 diabetes mellitus without complication, without long-term current use of insulin    Nocturia    Coronary artery disease involving native coronary artery of native heart without angina pectoris    Aortic atherosclerosis    Sensory neuropathy of right hand    Trigger ring finger of left hand    Other dysphagia    Lo's esophagus without dysplasia    Gastroesophageal reflux disease with esophagitis without hemorrhage    Mild cognitive impairment    Acute lower GI bleeding    Anemia due to acute blood loss    History of snoring    Daytime somnolence     Advance Care Planning Advance Directive is not on file.  ACP discussion was held with the patient during this visit. Patient does not have an advance directive, information provided.            Objective   Vitals:    07/16/25 0848   BP: 116/74   Pulse: 84   SpO2: 100%   Weight: 69.9 kg (154 lb)   Height: 182.9 cm (72\")   PainSc: 3    PainLoc: Leg       Estimated body mass index is 20.89 kg/m² as calculated from the following:    Height as of this encounter: 182.9 cm (72\").    Weight as of this encounter: 69.9 kg (154 lb).    BMI is within normal parameters. No other follow-up for BMI required.           Does the patient have evidence of cognitive impairment? No  Lab Results   Component Value Date    CHLPL 127 07/08/2025    TRIG 56 07/08/2025    HDL 51 07/08/2025    LDL 64 07/08/2025    VLDL 12 07/08/2025    HGBA1C 6.0 (H) 07/08/2025                                  "                                                               Health  Risk Assessment    Smoking Status:  Social History     Tobacco Use   Smoking Status Former    Current packs/day: 0.00    Average packs/day: 1.5 packs/day for 19.9 years (29.8 ttl pk-yrs)    Types: Cigarettes    Start date: 1971    Quit date: 1991    Years since quittin.5    Passive exposure: Past   Smokeless Tobacco Never     Alcohol Consumption:  Social History     Substance and Sexual Activity   Alcohol Use Not Currently    Comment: current some day; 2/month       Fall Risk Screen  STEADI Fall Risk Assessment was completed, and patient is at LOW risk for falls.Assessment completed on:2025    Depression Screening   Little interest or pleasure in doing things? Not at all   Feeling down, depressed, or hopeless? Not at all   PHQ-2 Total Score 0      Health Habits and Functional and Cognitive Screenin/16/2025     9:03 AM   Functional & Cognitive Status   Do you have difficulty preparing food and eating? No   Do you have difficulty bathing yourself, getting dressed or grooming yourself? No   Do you have difficulty using the toilet? No   Do you have difficulty moving around from place to place? No   Do you have trouble with steps or getting out of a bed or a chair? No   Current Diet Well Balanced Diet   Dental Exam Up to date   Eye Exam Up to date   Exercise (times per week) 4 times per week   Current Exercises Include Running/Jogging   Do you need help using the phone?  No   Are you deaf or do you have serious difficulty hearing?  No   Do you need help to go to places out of walking distance? No   Do you need help shopping? No   Do you need help preparing meals?  No   Do you need help with housework?  No   Do you need help with laundry? No   Do you need help taking your medications? No   Do you need help managing money? No   Do you ever drive or ride in a car without wearing a seat belt? No   Have you felt unusual fatigue  (could be tiredness), stress, anger or loneliness in the last month? No   Who do you live with? Spouse   If you need help, do you have trouble finding someone available to you? No   Have you been bothered in the last four weeks by sexual problems? No   Do you have difficulty concentrating, remembering or making decisions? No           Age-appropriate Screening Schedule:  Refer to the list below for future screening recommendations based on patient's age, sex and/or medical conditions. Orders for these recommended tests are listed in the plan section. The patient has been provided with a written plan.    Health Maintenance List  Health Maintenance   Topic Date Due    DIABETIC FOOT EXAM  02/17/2022    URINE MICROALBUMIN-CREATININE RATIO (uACR)  06/02/2024    COVID-19 Vaccine (5 - 2024-25 season) 09/01/2024    COLORECTAL CANCER SCREENING  12/06/2025    TDAP/TD VACCINES (3 - Td or Tdap) 07/30/2025 (Originally 10/30/2024)    ZOSTER VACCINE (2 of 3) 07/30/2025 (Originally 9/16/2015)    INFLUENZA VACCINE  10/01/2025    HEMOGLOBIN A1C  01/08/2026    DIABETIC EYE EXAM  02/04/2026    LIPID PANEL  07/08/2026    ANNUAL WELLNESS VISIT  07/16/2026    GASTROSCOPY (EGD)  04/08/2027    HEPATITIS C SCREENING  Completed    Pneumococcal Vaccine 50+  Completed    AAA SCREEN ONCE  Completed                                                                                                                                                CMS Preventative Services Quick Reference  Risk Factors Identified During Encounter  Immunizations Discussed/Encouraged: Influenza and COVID19  Inactivity/Sedentary: Patient was advised to exercise at least 150 minutes a week per CDC recommendations.    The above risks/problems have been discussed with the patient.  Pertinent information has been shared with the patient in the After Visit Summary.  An After Visit Summary and PPPS were made available to the patient.    Follow Up:   Next Medicare Wellness visit  "to be scheduled in 1 year.         Additional E&M Note during same encounter follows:  Patient has additional, significant, and separately identifiable condition(s)/problem(s) that require work above and beyond the Medicare Wellness Visit     Chief Complaint  Medicare Wellness-subsequent    Subjective    HPI  Yaakov is also being seen today for additional medical problem/s.       The patient is a 69-year-old male presenting for a Medicare wellness visit.    No significant changes in physical or cognitive health have occurred over the past year. He has had no hospital admissions and does not have a living will. He plans to increase his activity with a new puppy and does not misuse alcohol or smoke.    The patient reports nocturia 3-4 times per night but is unsure if it causes awakenings. He has not consulted a urologist.    He reports snoring and daytime fatigue. He was diagnosed with sleep apnea 20 years ago and used CPAP, which he discontinued after improvement, but snoring has returned.    He is on atorvastatin for cholesterol management.    He is taking bupropion for anxiety, which is well controlled.    He is taking metformin for type 2 diabetes.    He is taking omeprazole for Lo's esophagus and GERD, which are well controlled. His last upper endoscopy was a year ago, and the next is due in 2027.    He uses tadalafil as needed and does not require a refill.    He completed cephalexin for a resolved toe blister.    SOCIAL HISTORY  He does not misuse alcohol and does not smoke.    ALLERGIES  The patient is allergic to ASPIRIN.    MEDICATIONS  atorvastatin, bupropion, metformin, omeprazole, tadalafil (as needed), cephalexin (completed)    IMMUNIZATIONS  He is due for COVID-19 booster vaccine.          Objective   Vital Signs:  /74   Pulse 84   Ht 182.9 cm (72\")   Wt 69.9 kg (154 lb)   SpO2 100%   BMI 20.89 kg/m²   Physical Exam      General Appearance: Normal appearance, No acute " distress.  Respiratory: No respiratory distress, lungs clear to auscultation with no wheezes or rubs.  Cardiovascular: regular rate and rhythm with no murmurs, rubs, or gallop.  Extremities: no pretibial edema bilaterally.  Psychiatric: normal affect, pleasant, cooperative with exam.  Other observations: no carotid bruits auscultated bilaterally.    The following data was reviewed by: Dylan Paulson MD on 07/16/2025:        Results  - Laboratory Studies:    - Hemoglobin A1c: 6    - Total cholesterol: 127    - HDL: 51    - LDL: 64    - Triglycerides: 56           Assessment and Plan        Medicare annual wellness visit, subsequent  Immunization needs have been updated.  Patient was instructed to get flu shot this fall.  COVID-vaccine given today.  Recommendation for increased exercise 30 minutes 5 days a week.  (Example brisk walk 30 minutes).  Copies of healthy diet shared and after visit summary.       Pure hypercholesterolemia     Pure hypercholesterolemia is well-controlled.  Continue with atorvastatin.  Continue with heart healthy diet.  Mediterranean diet shared in the after visit summary.  Continue with regular exercise  Orders:    atorvastatin (LIPITOR) 40 MG tablet; Take 1 tablet by mouth every night at bedtime for 270 days.    Comprehensive Metabolic Panel; Future    CBC & Differential; Future    CK; Future    Lipid Panel With LDL / HDL Ratio; Future    Anxiety  Condition is stable. The current medical regimen is effective;  continue present plan and medications.  Orders:    buPROPion XL (WELLBUTRIN XL) 300 MG 24 hr tablet; Take 1 tablet by mouth every night at bedtime for 270 days.    Type 2 diabetes mellitus without complication, without long-term current use of insulin    Type 2 diabetes is well-controlled.  Continue with metformin as prescribed.  Labs again in 6 months  Orders:    metFORMIN ER (GLUCOPHAGE-XR) 500 MG 24 hr tablet; Take 4 tablets by mouth Daily With Dinner for 270 days.    Comprehensive  Metabolic Panel; Future    Hemoglobin A1c; Future    MicroAlbumin, Urine, Random - Urine, Clean Catch; Future    Microalbumin / Creatinine Urine Ratio - Urine, Clean Catch; Future    Gastroesophageal reflux disease with esophagitis without hemorrhage  Condition is stable. The current medical regimen is effective;  continue present plan and medications.  Orders:    omeprazole (priLOSEC) 40 MG capsule; Take 1 capsule by mouth Daily.    Lo's esophagus without dysplasia  Condition is stable. The current medical regimen is effective;  continue present plan and medications.  Orders:    omeprazole (priLOSEC) 40 MG capsule; Take 1 capsule by mouth Daily.    Nocturia  Nocturia 3-4 times a night.  We will check PSA in 6 months.  Orders:    PSA DIAGNOSTIC; Future    Coronary artery disease involving native coronary artery of native heart without angina pectoris    Continue with current therapy.  Recheck labs again in 6 months       Aortic atherosclerosis  Continue with statin therapy.  Recheck labs again in 6 months  Orders:    Lipid Panel With LDL / HDL Ratio; Future    Daytime somnolence    Orders:    Ambulatory Referral to Sleep Medicine    History of snoring    Orders:    Ambulatory Referral to Sleep Medicine    Immunization due    Orders:    COVID-19 (Pfizer) 12yrs+ (COMIRNATY)              Follow Up   Return in about 6 months (around 1/16/2026) for recheck/refill medication.  Patient was given instructions and counseling regarding his condition or for health maintenance advice. Please see specific information pulled into the AVS if appropriate.  Patient or patient representative verbalized consent for the use of Ambient Listening during the visit with  Dylan Paulson MD for chart documentation. 7/16/2025  09:34 EDT

## 2025-07-16 NOTE — ASSESSMENT & PLAN NOTE
Condition is stable. The current medical regimen is effective;  continue present plan and medications.  Orders:    omeprazole (priLOSEC) 40 MG capsule; Take 1 capsule by mouth Daily.

## 2025-07-16 NOTE — ASSESSMENT & PLAN NOTE
{Coronary Artery Disease (OPTIONAL):72564}  Continue with current therapy.  Recheck labs again in 6 months

## 2025-07-16 NOTE — PATIENT INSTRUCTIONS
Advance Care Planning and Advance Directives     You make decisions on a daily basis - decisions about where you want to live, your career, your home, your life. Perhaps one of the most important decisions you face is your choice for future medical care. Take time to talk with your family and your healthcare team and start planning today.  Advance Care Planning is a process that can help you:  Understand possible future healthcare decisions in light of your own experiences  Reflect on those decision in light of your goals and values  Discuss your decisions with those closest to you and the healthcare professionals that care for you  Make a plan by creating a document that reflects your wishes    Surrogate Decision Maker  In the event of a medical emergency, which has left you unable to communicate or to make your own decisions, you would need someone to make decisions for you.  It is important to discuss your preferences for medical treatment with this person while you are in good health.     Qualities of a surrogate decision maker:  Willing to take on this role and responsibility  Knows what you want for future medical care  Willing to follow your wishes even if they don't agree with them  Able to make difficult medical decisions under stressful circumstances    Advance Directives  These are legal documents you can create that will guide your healthcare team and decision maker(s) when needed. These documents can be stored in the electronic medical record.    Living Will - a legal document to guide your care if you have a terminal condition or a serious illness and are unable to communicate. States vary by statute in document names/types, but most forms may include one or more of the following:        -  Directions regarding life-prolonging treatments        -  Directions regarding artificially provided nutrition/hydration        -  Choosing a healthcare decision maker        -  Direction regarding organ/tissue  donation    Durable Power of  for Healthcare - this document names an -in-fact to make medical decisions for you, but it may also allow this person to make personal and financial decisions for you. Please seek the advice of an  if you need this type of document.    **Advance Directives are not required and no one may discriminate against you if you do not sign one.    Medical Orders  Many states allow specific forms/orders signed by your physician to record your wishes for medical treatment in your current state of health. This form, signed in personal communication with your physician, addresses resuscitation and other medical interventions that you may or may not want.      For more information or to schedule a time with a [facility] Advance Care Planning Facilitator contact: [email or web address] or call [phone] and someone will contact you directly.    Medicare Wellness  Personal Prevention Plan of Service     Date of Office Visit:    Encounter Provider:  Dylan Paulson MD  Place of Service:  [facility]  Patient Name: Dylan Mosher  :  1955    As part of the Medicare Wellness portion of your visit today, we are providing you with this personalized preventive plan of services (PPPS). This plan is based upon recommendations of the [place] Preventive Services Task Force (USPSTF) and the Advisory Committee on Immunization Practices (ACIP).    This lists the preventive care services that should be considered, and provides dates of when you are due. Items listed as completed are up-to-date and do not require any further intervention.    Health Maintenance   Topic Date Due    DIABETIC FOOT EXAM  2022    URINE MICROALBUMIN-CREATININE RATIO (uACR)  2024    COVID-19 Vaccine ( season) 2024    COLORECTAL CANCER SCREENING  2025    TDAP/TD VACCINES (3 - Td or Tdap) 2025 (Originally 10/30/2024)    ZOSTER  VACCINE (2 of 3) 07/30/2025 (Originally 9/16/2015)    INFLUENZA VACCINE  10/01/2025    HEMOGLOBIN A1C  01/08/2026    DIABETIC EYE EXAM  02/04/2026    LIPID PANEL  07/08/2026    ANNUAL WELLNESS VISIT  07/16/2026    GASTROSCOPY (EGD)  04/08/2027    HEPATITIS C SCREENING  Completed    Pneumococcal Vaccine 50+  Completed    AAA SCREEN ONCE  Completed       Orders Placed This Encounter   Procedures    COVID-19 (Pfizer) 12yrs+ (COMIRNATY)    Comprehensive Metabolic Panel     Standing Status:   Future     Expected Date:   12/13/2025     Expiration Date:   7/16/2026     Release to patient:   Routine Release [[id number]]    CK     Standing Status:   Future     Expected Date:   12/13/2025     Expiration Date:   7/16/2026     Release to patient:   Routine Release [[id number]]    Hemoglobin A1c     Standing Status:   Future     Expected Date:   12/13/2025     Expiration Date:   7/16/2026     Release to patient:   Routine Release [[id number]]    MicroAlbumin, Urine, Random - Urine, Clean Catch     Standing Status:   Future     Expected Date:   12/13/2025     Expiration Date:   7/16/2026     Release to patient:   Routine Release [[id number]]    PSA DIAGNOSTIC     Standing Status:   Future     Expected Date:   12/13/2025     Expiration Date:   7/16/2026     Release to patient:   Routine Release [[id number]]    Microalbumin / Creatinine Urine Ratio - Urine, Clean Catch     Standing Status:   Future     Expected Date:   12/13/2025     Expiration Date:   7/16/2026     Release to patient:   Routine Release [[id number]]    Lipid Panel With LDL / HDL Ratio     Standing Status:   Future     Expected Date:   12/13/2025     Expiration Date:   7/16/2026     Release to patient:   Routine Release [[id number]]    Ambulatory Referral to Sleep Medicine     Referral Priority:   Routine     Referral Type:   Consultation     Referral Reason:   Specialty Services Required     Referred to Provider:   Rishi Lehman MD     Requested Specialty:   Sleep  Medicine     Number of Visits Requested:   1    CBC & Differential     Standing Status:   Future     Expected Date:   12/13/2025     Manual Differential:   No     Release to patient:   Routine Release [[id number]]       Return in about 6 months (around 1/16/2026) for recheck/refill medication.      Exercising to Stay Healthy  To become healthy and stay healthy, it is recommended that you do moderate-intensity and vigorous-intensity exercise. You can tell that you are exercising at a moderate intensity if your heart starts beating faster and you start breathing faster but can still hold a conversation. You can tell that you are exercising at a vigorous intensity if you are breathing much harder and faster and cannot hold a conversation while exercising.  How can exercise benefit me?  Exercising regularly is important. It has many health benefits, such as:  Improving overall fitness, flexibility, and endurance.  Increasing bone density.  Helping with weight control.  Decreasing body fat.  Increasing muscle strength and endurance.  Reducing stress and tension, anxiety, depression, or anger.  Improving overall health.  What guidelines should I follow while exercising?  Before you start a new exercise program, talk with your health care provider.  Do not exercise so much that you hurt yourself, feel dizzy, or get very short of breath.  Wear comfortable clothes and wear shoes with good support.  Drink plenty of water while you exercise to prevent dehydration or heat stroke.  Work out until your breathing and your heartbeat get faster (moderate intensity).  How often should I exercise?  Choose an activity that you enjoy, and set realistic goals. Your health care provider can help you make an activity plan that is individually designed and works best for you.  Exercise regularly as told by your health care provider. This may include:  Doing strength training two times a week, such as:  Lifting weights.  Using resistance  bands.  Push-ups.  Sit-ups.  Yoga.  Doing a certain intensity of exercise for a given amount of time. Choose from these options:  A total of 150 minutes of moderate-intensity exercise every week.  A total of 75 minutes of vigorous-intensity exercise every week.  A mix of moderate-intensity and vigorous-intensity exercise every week.  Children, pregnant women, people who have not exercised regularly, people who are overweight, and older adults may need to talk with a health care provider about what activities are safe to perform. If you have a medical condition, be sure to talk with your health care provider before you start a new exercise program.  What are some exercise ideas?  Moderate-intensity exercise ideas include:  Walking 1 mile (1.6 km) in about 15 minutes.  Biking.  Hiking.  Golfing.  Dancing.  Water aerobics.  Vigorous-intensity exercise ideas include:  Walking 4.5 miles (7.2 km) or more in about 1 hour.  Jogging or running 5 miles (8 km) in about 1 hour.  Biking 10 miles (16.1 km) or more in about 1 hour.  Lap swimming.  Roller-skating or in-line skating.  Cross-country skiing.  Vigorous competitive sports, such as football, basketball, and soccer.  Jumping rope.  Aerobic dancing.  What are some everyday activities that can help me get exercise?  Yard work, such as:  Pushing a .  Raking and bagging leaves.  Washing your car.  Pushing a stroller.  Shoveling snow.  Gardening.  Washing windows or floors.  How can I be more active in my day-to-day activities?  Use stairs instead of an elevator.  Take a walk during your lunch break.  If you drive, park your car farther away from your work or school.  If you take public transportation, get off one stop early and walk the rest of the way.  Stand up or walk around during all of your indoor phone calls.  Get up, stretch, and walk around every 30 minutes throughout the day.  Enjoy exercise with a friend. Support to continue exercising will help you keep  a regular routine of activity.  Where to find more information  You can find more information about exercising to stay healthy from:  [place] Department of Health and Human Services: www.hhs.gov  Centers for Disease Control and Prevention (CDC): www.cdc.gov  Summary  Exercising regularly is important. It will improve your overall fitness, flexibility, and endurance.  Regular exercise will also improve your overall health. It can help you control your weight, reduce stress, and improve your bone density.  Do not exercise so much that you hurt yourself, feel dizzy, or get very short of breath.  Before you start a new exercise program, talk with your health care provider.  This information is not intended to replace advice given to you by your health care provider. Make sure you discuss any questions you have with your health care provider.  Document Revised: 04/15/2022 Document Reviewed: 04/15/2022  [employer] Patient Education © 2024 [employer]Mediterranean Diet  A Mediterranean diet is based on the traditions of countries on the [place]. It focuses on eating more:  Fruits and vegetables.  Whole grains, beans, nuts, and seeds.  Heart-healthy fats. These are fats that are good for your heart.  It involves eating less:  Dairy.  Meat and eggs.  Processed foods with added sugar, salt, and fat.  This type of diet can help prevent certain conditions. It can also improve outcomes if you have a long-term (chronic) disease, such as kidney or heart disease.  What are tips for following this plan?  Reading food labels  Check packaged foods for:  The serving size. For foods such as rice and pasta, the serving size is the amount of cooked product, not dry.  The total fat. Avoid foods with saturated fat or trans fat.  Added sugars, such as corn syrup.  Shopping    Try to have a balanced diet. Buy a variety of foods, such as:  Fresh fruits and vegetables. You may be able to get these from local farmers markets. You can also  buy them frozen.  Grains, beans, nuts, and seeds. Some of these can be bought in bulk.  Fresh seafood.  Poultry and eggs.  Low-fat dairy products.  Buy whole ingredients instead of foods that have already been packaged.  If you can't get fresh seafood, buy precooked frozen shrimp or canned fish, such as tuna, salmon, or sardines.  Stock your pantry so you always have certain foods on hand, such as olive oil, canned tuna, canned tomatoes, rice, pasta, and beans.  Cooking  Cook foods with extra-virgin olive oil instead of using butter or other vegetable oils.  Have meat as a side dish. Have vegetables or grains as your main dish. This means having meat in small portions or adding small amounts of meat to foods like pasta or stew.  Use beans or vegetables instead of meat in common dishes like chili or lasagna.  Try out different cooking methods. Try roasting, broiling, steaming, and sautéing vegetables.  Add frozen vegetables to soups, stews, pasta, or rice.  Add nuts or seeds for added healthy fats and plant protein at each meal. You can add these to yogurt, salads, or vegetable dishes.  Marinate fish or vegetables using olive oil, lemon juice, garlic, and fresh herbs.  Meal planning  Plan to eat a vegetarian meal one day each week. Try to work up to two vegetarian meals, if possible.  Eat seafood two or more times a week.  Have healthy snacks on hand. These may include:  Vegetable sticks with hummus.  Greek yogurt.  Fruit and nut trail mix.  Eat balanced meals. These should include:  Fruit: 2-3 servings a day.  Vegetables: 4-5 servings a day.  Low-fat dairy: 2 servings a day.  Fish, poultry, or lean meat: 1 serving a day.  Beans and legumes: 2 or more servings a week.  Nuts and seeds: 1-2 servings a day.  Whole grains: 6-8 servings a day.  Extra-virgin olive oil: 3-4 servings a day.  Limit red meat and sweets to just a few servings a month.  Lifestyle    Try to cook and eat meals with your family.  Drink enough fluid  to keep your pee (urine) pale yellow.  Be active every day. This includes:  Aerobic exercise, which is exercise that causes your heart to beat faster. Examples include running and swimming.  Leisure activities like gardening, walking, or housework.  Get 7-8 hours of sleep each night.  Drink red wine if your provider says you can. A glass of wine is 5 oz (150 mL). You may be allowed to have:  Up to 1 glass a day if you're female and not pregnant.  Up to 2 glasses a day if you're male.  What foods should I eat?  Fruits  Apples. Apricots. Avocado. Berries. Bananas. Cherries. Dates. Figs. Grapes. [place]. Melon. Oranges. Peaches. Plums. Pomegranate.  Vegetables  Artichokes. Beets. Broccoli. Cabbage. Carrots. Eggplant. Green beans. Chard. Kale. Spinach. Onions. Leeks. Peas. Squash. Tomatoes. Peppers. Radishes.  Grains  Whole-grain pasta. Brown rice. Bulgur wheat. Polenta. Couscous. Whole-wheat bread. Oatmeal. Quinoa.  Meats and other proteins  Beans. Almonds. Sunflower seeds. Pine nuts. Peanuts. Cod. [place]. Scallops. Shrimp. Tuna. Tilapia. Clams. Oysters. Eggs. Chicken or turkey without skin.  Dairy  Low-fat milk. Cheese. Greek yogurt.  Fats and oils  Extra-virgin olive oil. Avocado oil. Grapeseed oil.  Beverages  Water. Red wine. Herbal tea.  Sweets and desserts  Greek yogurt with honey. Baked apples. Poached pears. Trail mix.  Seasonings and condiments  Basil. Cilantro. Coriander. Cumin. Mint. Parsley. Jaret. Rosemary. Tarragon. Garlic. Oregano. Thyme. Pepper. Balsamic vinegar. Tahini. Hummus. Tomato sauce. Olives. Mushrooms.  The items listed above may not be all the foods and drinks you can have. Talk to a dietitian to learn more.  What foods should I limit?  This is a list of foods that should be eaten rarely.  Fruits  Fruit canned in syrup.  Vegetables  Deep-fried potatoes, like French fries.  Grains  Packaged pasta or rice dishes. Cereal with added sugar. Snacks with added sugar.  Meats and other proteins  Beef.  Pork. Lamb. Chicken or turkey with skin. Hot dogs. [place].  Dairy  Ice cream. Sour cream. Whole milk.  Fats and oils  Butter. Canola oil. Vegetable oil. Beef fat (tallow). Lard.  Beverages  Juice. Sugar-sweetened soft drinks. Beer. Liquor and spirits.  Sweets and desserts  Cookies. Cakes. Pies. Candy.  Seasonings and condiments  Mayonnaise. Pre-made sauces and marinades.  The items listed above may not be all the foods and drinks you should limit. Talk to a dietitian to learn more.  Where to find more information  American Heart Association (AHA): heart.org  This information is not intended to replace advice given to you by your health care provider. Make sure you discuss any questions you have with your health care provider.  Document Revised: 03/31/2024 Document Reviewed: 03/31/2024  [employer] Patient Education © 2024 [employer]

## 2025-08-21 ENCOUNTER — OFFICE VISIT (OUTPATIENT)
Dept: SLEEP MEDICINE | Facility: HOSPITAL | Age: 70
End: 2025-08-21
Payer: MEDICARE

## 2025-08-21 VITALS
BODY MASS INDEX: 20.59 KG/M2 | WEIGHT: 152 LBS | HEART RATE: 83 BPM | SYSTOLIC BLOOD PRESSURE: 107 MMHG | OXYGEN SATURATION: 97 % | HEIGHT: 72 IN | DIASTOLIC BLOOD PRESSURE: 66 MMHG

## 2025-08-21 DIAGNOSIS — R06.83 SNORING: ICD-10-CM

## 2025-08-21 DIAGNOSIS — G47.8 NON-RESTORATIVE SLEEP: ICD-10-CM

## 2025-08-21 DIAGNOSIS — G47.30 OBSERVED SLEEP APNEA: Primary | ICD-10-CM

## 2025-08-21 DIAGNOSIS — G47.19 EXCESSIVE DAYTIME SLEEPINESS: ICD-10-CM

## 2025-08-21 DIAGNOSIS — Z87.898 HISTORY OF SNORING: ICD-10-CM

## 2025-08-21 PROCEDURE — G0463 HOSPITAL OUTPT CLINIC VISIT: HCPCS

## 2025-08-21 PROCEDURE — 1159F MED LIST DOCD IN RCRD: CPT | Performed by: INTERNAL MEDICINE

## 2025-08-21 PROCEDURE — 1160F RVW MEDS BY RX/DR IN RCRD: CPT | Performed by: INTERNAL MEDICINE

## 2025-08-21 PROCEDURE — 99204 OFFICE O/P NEW MOD 45 MIN: CPT | Performed by: INTERNAL MEDICINE

## 2025-08-27 ENCOUNTER — HOSPITAL ENCOUNTER (OUTPATIENT)
Dept: SLEEP MEDICINE | Facility: HOSPITAL | Age: 70
Discharge: HOME | End: 2025-08-27
Admitting: INTERNAL MEDICINE
Payer: MEDICARE

## 2025-08-27 DIAGNOSIS — R06.83 SNORING: ICD-10-CM

## 2025-08-27 DIAGNOSIS — G47.19 EXCESSIVE DAYTIME SLEEPINESS: ICD-10-CM

## 2025-08-27 DIAGNOSIS — Z87.898 HISTORY OF SNORING: ICD-10-CM

## 2025-08-27 DIAGNOSIS — G47.8 NON-RESTORATIVE SLEEP: ICD-10-CM

## 2025-08-27 DIAGNOSIS — G47.30 OBSERVED SLEEP APNEA: ICD-10-CM

## 2025-08-27 PROCEDURE — 95810 POLYSOM 6/> YRS 4/> PARAM: CPT

## (undated) DEVICE — KT ORCA ORCAPOD DISP STRL

## (undated) DEVICE — CANN O2 ETCO2 FITS ALL CONN CO2 SMPL A/ 7IN DISP LF

## (undated) DEVICE — FRCP BX RADJAW4 NDL 2.8 240CM LG OG BX40

## (undated) DEVICE — DEV INFL ALLIANCE2 SYS

## (undated) DEVICE — ADAPT CLN BIOGUARD AIR/H2O DISP

## (undated) DEVICE — Device

## (undated) DEVICE — VIAL FORMLN CAP 10PCT 20ML

## (undated) DEVICE — ESOPHAGEAL BALLOON DILATATION CATHETER: Brand: CRE FIXED WIRE

## (undated) DEVICE — MSK PROC CURAPLEX O2 2/ADAPT 7FT

## (undated) DEVICE — MSK ENDO PORT O2 POM ELITE CURAPLEX A/

## (undated) DEVICE — GOWN ISOL W/THUMB UNIV BLU BX/15

## (undated) DEVICE — LN SMPL CO2 SHTRM SD STREAM W/M LUER

## (undated) DEVICE — SENSR O2 OXIMAX FNGR A/ 18IN NONSTR

## (undated) DEVICE — BITEBLOCK OMNI BLOC

## (undated) DEVICE — TUBING, SUCTION, 1/4" X 10', STRAIGHT: Brand: MEDLINE

## (undated) DEVICE — ADAPT CLN SCPE ENDO PORPOISE BX/50 DISP

## (undated) DEVICE — SINGLE-USE BIOPSY FORCEPS: Brand: RADIAL JAW 4